# Patient Record
Sex: MALE | Race: WHITE | NOT HISPANIC OR LATINO | Employment: OTHER | URBAN - METROPOLITAN AREA
[De-identification: names, ages, dates, MRNs, and addresses within clinical notes are randomized per-mention and may not be internally consistent; named-entity substitution may affect disease eponyms.]

---

## 2017-01-19 ENCOUNTER — TELEPHONE (OUTPATIENT)
Dept: FAMILY MEDICINE | Facility: CLINIC | Age: 57
End: 2017-01-19

## 2017-01-19 DIAGNOSIS — R05.9 COUGH: Primary | ICD-10-CM

## 2017-01-19 DIAGNOSIS — R53.83 FATIGUE: ICD-10-CM

## 2017-01-19 DIAGNOSIS — R09.81 NASAL CONGESTION: ICD-10-CM

## 2017-01-19 RX ORDER — AZITHROMYCIN 250 MG/1
TABLET, FILM COATED ORAL
Qty: 6 TABLET | Refills: 0 | Status: SHIPPED | OUTPATIENT
Start: 2017-01-19 | End: 2017-01-30

## 2017-01-19 NOTE — TELEPHONE ENCOUNTER
Acute Illness   Acute illness concerns: cold/congestion/cough/fatigue  Onset: pt on 3rd day home sick,  Has been lingering for about a week now    Fever: no    Chills/Sweats: YES    Headache (location?): no    Sinus Pressure:YES- above nose    Conjunctivitis:  no    Ear Pain: no    Rhinorrhea: YES    Congestion: YES    Sore Throat: YES     Cough: YES-non-productive right now    Wheeze: no    Decreased Appetite: YES    Nausea: no    Vomiting: no    Diarrhea:  YES    Dysuria/Freq.: no    Fatigue/Achiness: YES    Sick/Strep Exposure: YES- wife and kids have had colds and pt works in retail as well     Therapies Tried and outcome: water, aleve for a couple days, zycam- minimally effective.    Pt uses [facility]. AE below had spoken to TS regarding the pt, advised pt can have a zpack if triaged and noted symptoms for antibiotic.  The patient indicates understanding of these issues and agrees with the plan.  Dorothy Lo RN

## 2017-01-19 NOTE — TELEPHONE ENCOUNTER
Attempted to call patient.  Received patients voicemail.  Left a non-detailed message to call back and speak with any triage nurse.      Please triage patient for symptoms    Dorothy Anne RN, BSN   [place], Triage

## 2017-01-30 ENCOUNTER — APPOINTMENT (OUTPATIENT)
Dept: CT IMAGING | Facility: CLINIC | Age: 57
End: 2017-01-30
Attending: EMERGENCY MEDICINE
Payer: COMMERCIAL

## 2017-01-30 ENCOUNTER — HOSPITAL ENCOUNTER (EMERGENCY)
Facility: CLINIC | Age: 57
Discharge: HOME | End: 2017-01-30
Attending: EMERGENCY MEDICINE | Admitting: EMERGENCY MEDICINE
Payer: COMMERCIAL

## 2017-01-30 VITALS
HEIGHT: 76 IN | DIASTOLIC BLOOD PRESSURE: 85 MMHG | OXYGEN SATURATION: 98 % | RESPIRATION RATE: 17 BRPM | TEMPERATURE: 98 F | BODY MASS INDEX: 28.01 KG/M2 | WEIGHT: 230 LBS | HEART RATE: 68 BPM | SYSTOLIC BLOOD PRESSURE: 142 MMHG

## 2017-01-30 DIAGNOSIS — K57.32 DIVERTICULITIS OF LARGE INTESTINE WITHOUT PERFORATION OR ABSCESS WITHOUT BLEEDING: ICD-10-CM

## 2017-01-30 LAB
ANION GAP SERPL CALCULATED.3IONS-SCNC: 8 MMOL/L (ref 3–14)
BASOPHILS # BLD AUTO: 0 10E9/L (ref 0–0.2)
BASOPHILS NFR BLD AUTO: 0.4 %
BUN SERPL-MCNC: 14 MG/DL (ref 7–30)
CALCIUM SERPL-MCNC: 8.9 MG/DL (ref 8.5–10.1)
CHLORIDE SERPL-SCNC: 105 MMOL/L (ref 94–109)
CO2 SERPL-SCNC: 25 MMOL/L (ref 20–32)
CREAT BLD-MCNC: 1 MG/DL (ref 0.66–1.25)
CREAT SERPL-MCNC: 0.88 MG/DL (ref 0.66–1.25)
DIFFERENTIAL METHOD BLD: NORMAL
EOSINOPHIL # BLD AUTO: 0.3 10E9/L (ref 0–0.7)
EOSINOPHIL NFR BLD AUTO: 3.7 %
ERYTHROCYTE [DISTWIDTH] IN BLOOD BY AUTOMATED COUNT: 12.1 % (ref 10–15)
GFR SERPL CREATININE-BSD FRML MDRD: 78 ML/MIN/1.7M2
GFR SERPL CREATININE-BSD FRML MDRD: 90 ML/MIN/1.7M2
GLUCOSE SERPL-MCNC: 102 MG/DL (ref 70–99)
HCT VFR BLD AUTO: 41.1 % (ref 40–53)
HGB BLD-MCNC: 14.6 G/DL (ref 13.3–17.7)
IMM GRANULOCYTES # BLD: 0 10E9/L (ref 0–0.4)
IMM GRANULOCYTES NFR BLD: 0.4 %
LYMPHOCYTES # BLD AUTO: 0.8 10E9/L (ref 0.8–5.3)
LYMPHOCYTES NFR BLD AUTO: 9.3 %
MCH RBC QN AUTO: 29.6 PG (ref 26.5–33)
MCHC RBC AUTO-ENTMCNC: 35.5 G/DL (ref 31.5–36.5)
MCV RBC AUTO: 83 FL (ref 78–100)
MONOCYTES # BLD AUTO: 1 10E9/L (ref 0–1.3)
MONOCYTES NFR BLD AUTO: 11.5 %
NEUTROPHILS # BLD AUTO: 6.2 10E9/L (ref 1.6–8.3)
NEUTROPHILS NFR BLD AUTO: 74.7 %
NRBC # BLD AUTO: 0 10*3/UL
NRBC BLD AUTO-RTO: 0 /100
PLATELET # BLD AUTO: 262 10E9/L (ref 150–450)
POTASSIUM SERPL-SCNC: 4 MMOL/L (ref 3.4–5.3)
RBC # BLD AUTO: 4.94 10E12/L (ref 4.4–5.9)
SODIUM SERPL-SCNC: 138 MMOL/L (ref 133–144)
WBC # BLD AUTO: 8.3 10E9/L (ref 4–11)

## 2017-01-30 PROCEDURE — 25000128 H RX IP 250 OP 636: Performed by: EMERGENCY MEDICINE

## 2017-01-30 PROCEDURE — 25000125 ZZHC RX 250: Performed by: EMERGENCY MEDICINE

## 2017-01-30 PROCEDURE — 25500064 ZZH RX 255 OP 636: Performed by: EMERGENCY MEDICINE

## 2017-01-30 PROCEDURE — 99285 EMERGENCY DEPT VISIT HI MDM: CPT | Mod: 25

## 2017-01-30 PROCEDURE — 96361 HYDRATE IV INFUSION ADD-ON: CPT

## 2017-01-30 PROCEDURE — 74177 CT ABD & PELVIS W/CONTRAST: CPT

## 2017-01-30 PROCEDURE — 82565 ASSAY OF CREATININE: CPT

## 2017-01-30 PROCEDURE — 96374 THER/PROPH/DIAG INJ IV PUSH: CPT | Mod: 59

## 2017-01-30 PROCEDURE — 80048 BASIC METABOLIC PNL TOTAL CA: CPT | Performed by: EMERGENCY MEDICINE

## 2017-01-30 PROCEDURE — 96376 TX/PRO/DX INJ SAME DRUG ADON: CPT

## 2017-01-30 PROCEDURE — 85025 COMPLETE CBC W/AUTO DIFF WBC: CPT | Performed by: EMERGENCY MEDICINE

## 2017-01-30 PROCEDURE — 25000132 ZZH RX MED GY IP 250 OP 250 PS 637: Performed by: EMERGENCY MEDICINE

## 2017-01-30 RX ORDER — OXYCODONE AND ACETAMINOPHEN 5; 325 MG/1; MG/1
1-2 TABLET ORAL EVERY 6 HOURS PRN
Qty: 20 TABLET | Refills: 0 | Status: SHIPPED | OUTPATIENT
Start: 2017-01-30 | End: 2017-02-01

## 2017-01-30 RX ORDER — CIPROFLOXACIN 500 MG/1
500 TABLET, FILM COATED ORAL ONCE
Status: DISCONTINUED | OUTPATIENT
Start: 2017-01-30 | End: 2017-01-30

## 2017-01-30 RX ORDER — IOPAMIDOL 755 MG/ML
500 INJECTION, SOLUTION INTRAVASCULAR ONCE
Status: COMPLETED | OUTPATIENT
Start: 2017-01-30 | End: 2017-01-30

## 2017-01-30 RX ORDER — ONDANSETRON 4 MG/1
4 TABLET, ORALLY DISINTEGRATING ORAL EVERY 6 HOURS PRN
Qty: 10 TABLET | Refills: 0 | Status: SHIPPED | OUTPATIENT
Start: 2017-01-30 | End: 2017-02-02

## 2017-01-30 RX ORDER — OXYCODONE HYDROCHLORIDE 5 MG/1
10 TABLET ORAL ONCE
Status: COMPLETED | OUTPATIENT
Start: 2017-01-30 | End: 2017-01-30

## 2017-01-30 RX ORDER — METRONIDAZOLE 500 MG/1
500 TABLET ORAL ONCE
Status: DISCONTINUED | OUTPATIENT
Start: 2017-01-30 | End: 2017-01-30

## 2017-01-30 RX ORDER — HYDROMORPHONE HYDROCHLORIDE 1 MG/ML
0.5 INJECTION, SOLUTION INTRAMUSCULAR; INTRAVENOUS; SUBCUTANEOUS
Status: DISCONTINUED | OUTPATIENT
Start: 2017-01-30 | End: 2017-01-30 | Stop reason: HOSPADM

## 2017-01-30 RX ADMIN — HYDROMORPHONE HYDROCHLORIDE 1 MG: 1 INJECTION, SOLUTION INTRAMUSCULAR; INTRAVENOUS; SUBCUTANEOUS at 07:20

## 2017-01-30 RX ADMIN — OXYCODONE HYDROCHLORIDE 10 MG: 5 TABLET ORAL at 09:11

## 2017-01-30 RX ADMIN — SODIUM CHLORIDE 1000 ML: 9 INJECTION, SOLUTION INTRAVENOUS at 06:16

## 2017-01-30 RX ADMIN — HYDROMORPHONE HYDROCHLORIDE 0.5 MG: 1 INJECTION, SOLUTION INTRAMUSCULAR; INTRAVENOUS; SUBCUTANEOUS at 06:16

## 2017-01-30 RX ADMIN — SODIUM CHLORIDE 65 ML: 9 INJECTION, SOLUTION INTRAVENOUS at 06:29

## 2017-01-30 RX ADMIN — AMOXICILLIN AND CLAVULANATE POTASSIUM 1 TABLET: 875; 125 TABLET, FILM COATED ORAL at 07:20

## 2017-01-30 RX ADMIN — IOPAMIDOL 100 ML: 755 INJECTION, SOLUTION INTRAVENOUS at 06:28

## 2017-01-30 ASSESSMENT — ENCOUNTER SYMPTOMS
FEVER: 0
CONSTIPATION: 0
ABDOMINAL PAIN: 1
VOMITING: 0
DIARRHEA: 0
NAUSEA: 0

## 2017-01-30 NOTE — ED NOTES
Comes in with lower abd pain  For past week has been getting worse  Hx of diverticulitis  Denies n/v/d  No fever or chills   Last time he had this type pain was in hospital for week

## 2017-01-30 NOTE — DISCHARGE INSTRUCTIONS
Discharge Instructions for Diverticulitis  You have been diagnosed with diverticulitis. This is a condition in which small pouches form in your colon (large intestine) and become inflamed or infected. Follow the guidelines below for home care.  As you recover    Eat a low-fiber diet. Your health care provider may advise a liquid diet. This gives your bowel a chance to rest so that it can recover.    Foods to include: flake cereal, mashed potatoes, pancakes, waffles, pasta, white bread, rice, applesauce, bananas, eggs, meat, fish, poultry, tofu, cooked vegetables    Take your medicines as directed. Do not stop taking the medicines, even if you feel better.    Monitor your temperature and report any rising temperature to your health care provider.    Take antibiotics exactly as directed. Do not miss any.    Drink 6 to 8 glasses of water every day, unless directed otherwise.    Use a heating pad or hot water bottle to reduce abdominal cramping or pain.  Preventing diverticulitis in the future    Eat a high-fiber diet. Fiber adds bulk to the stool so that it passes through the large intestine more easily.    Keep drinking 6 to 8 glasses of water every day, unless directed otherwise.    Begin an exercise program. Ask your health care provider how to get started. You can benefit from simple activities such as walking or gardening.    Treat diarrhea with a bland diet. Start with liquids only, then slowly add fiber over time.    Watch for changes in your bowel movements (constipation to diarrhea).    Get plenty of rest and sleep.  Follow-up care  Make a follow-up appointment as directed by our staff.  When to call your health care provider  Call your health care provider immediately if you have any of the following:    Fever above 100 F (37.7 C)    Chills    Severe cramps in the abdomen, most commonly the lower left side    Tenderness in the abdomen, most commonly the lower left side    Nausea and vomiting    Bleeding  from your rectum     [id number] [employer]. [address] [place] [address], [place] [address]. All rights reserved. This information is not intended as a substitute for professional medical care. Always follow your healthcare professional's instructions.      Opioid Medication Information    You have been given a prescription for an opioid (narcotic) pain medicine and/or have received a pain medicine while here in the Emergency Department. These medicines can make you drowsy or impaired. You must not drive, operate dangerous equipment, or engage in any other dangerous activities while taking these medications. If you drive while taking these medications, you could be arrested for DUI, or driving under the influence. Do not drink any alcohol while you are taking these medications.   Opioid pain medications can cause addiction. If you have a history of chemical dependency of any type, you are at a higher risk of becoming addicted to pain medications.  Only take these prescribed medications to treat your pain when all other options have been tried. Take it for as short a time and as few doses as possible. Store your pain pills in a secure place, as they are frequently stolen and provide a dangerous opportunity for children or visitors in your house to start abusing these powerful medications. We will not replace any lost or stolen medicine.  As soon as your pain is better, you should flush all your remaining medication.   Many prescription pain medications contain Tylenol  (acetaminophen), including Vicodin , Tylenol #3 , Norco , Lortab , and Percocet .  You should not take any extra pills of Tylenol  if you are using these prescription medications or you can get very sick.  Do not ever take more than 4000 mg of acetaminophen in any 24 hour period.  All opioids tend to cause constipation. Drink plenty of water and eat foods that have a lot of fiber, such as fruits, vegetables, prune juice, apple juice and high  fiber cereal.  Take a laxative if you don t move your bowels at least every other day. Miralax , Milk of Magnesia, Colace , or Senna  can be used to keep you regular.

## 2017-01-30 NOTE — ED PROVIDER NOTES
History     Chief Complaint:  Abdominal Pain      HPI   Gonzalez Beard is a 56 year old male with a history of diverticulitis who presents for evaluation of abdominal pain. About a week ago, the patient started to develop intermittent abdominal pain across his lower quadrants. On the night of 1/29/2017, his pain worsened, and this morning at 0400 it worsened further, prompting him to seek evaluation in the ED. He characterizes this pain as a cramping sensation that he currently rates at a severity of 8/10. He has a history of diverticulitis and states that his current pain feels similar to this. He has not had any fever, nausea, vomiting, diarrhea, or constipation in association with his current pain. He has no history of previous major intraabdominal surgeries.     Allergies:  NKDA     Medications:    pantoprazole (PROTONIX) 40 MG EC tablet  Probiotic Product (PROBIOTIC DAILY PO)  fish oil-omega-3 fatty acids (OMEGA 3) 1000 MG capsule  aspirin 81 MG tablet  Multiple Vitamin (MULTIVITAMINS PO)    Past Medical History:    Esophageal reflux    Diverticulitis  Hyperlipidemia   Insomnia  Inguinal hernia   Benign neoplasm of colon     Past Surgical History:    Esophagoscopy, diagnostic  Colonoscopy thru stoma w biopsy/cautery tumor/polyp/lesion  Stress echo  Upper GI endoscopy   EGD, combined     Family History:    CAD - Father and brother  Diabetes - Brother  Hypertension - Father  Cerebrovascular disease - Father  Cancer, colon - Brother  Cancer, prostate - Father     Social History:  Tobacco use:    Former smoker, 0.75 packs / day for 25 years   Alcohol use:    Positive - 10 drinks / week  Marital status:       Accompanied to ED by:  Alone      Review of Systems   Constitutional: Negative for fever.   Gastrointestinal: Positive for abdominal pain. Negative for nausea, vomiting, diarrhea and constipation.   All other systems reviewed and are negative.    Physical Exam   First Vitals:  BP: (!) 191/110  "mmHg  Pulse: 88  Temp: 98  F (36.7  C)  Resp: 17  Height: 193 cm (6' 4\")  Weight: 104.327 kg (230 lb)  SpO2: 96 %      Physical Exam  General/Appearance: appears stated age, well-groomed, appears moderately uncomfortable  Eyes: EOMI, no scleral injection, no icterus  ENT: MMM  Neck: supple, nl ROM, no stiffness  Cardiovascular: RRR, nl S1S2, no m/r/g, 2+ pulses in all 4 extremities, cap refill <2sec  Respiratory: CTAB, good air movement throughout, no wheezes/rhonchi/rales, no increased WOB, no retractions  Back: no lesions  GI: abd soft, non-distended, mild to moderate tenderness to palpation across the lower quadrants,  no HSM, no rebound, no guarding, nl BS  MSK: LOO, good tone, no bony abnormality  Skin: warm and well-perfused, no rash, no edema, no ecchymosis, nl turgor  Neuro: GCS 15, alert and oriented, no gross focal neuro deficits  Psych: interacts appropriately  Heme: no petechia, no purpura, no active bleeding    Emergency Department Course     Imaging:  Radiographic findings were communicated with the patient who voiced understanding of the findings.    CT Abdomen Pelvis w Contrast:  IMPRESSION:   1. Diverticulitis of the mid sigmoid colon.  2. No abscess.  Per radiology.     Laboratory:  CBC: WNL (WBC 8.3, HGB 14.6, )    BMP: Glucose 102 high, o/w WNL (Creatinine 0.88)  Creatinine POCT 0612: Creatinine 1.0, GFR Estimate 78    Interventions:  0616 Dilaudid 0.5 mg IV  0616 NS 1,000 mL IV  0720 Augmentin 1 tablet PO  0720 Dilaudid 1 mg IV   The patient's symptoms were improved with parenteral narcotics.    Emergency Department Course:  Nursing notes and vitals reviewed.  0600: I performed an exam of the patient as documented above.     0705: I updated and reassessed the patient.     0822: I updated and reassessed the patient. His pain is currently gone, and he feels comfortable with discharge with pain medications.     I personally reviewed the laboratory results with the Patient and answered all " "related questions prior to discharge.      Findings and plan explained to the Patient. Patient discharged home with instructions regarding supportive care, medications, and reasons to return. The importance of close follow-up was reviewed. The patient was prescribed Augmentin, Percocet, and Zofran ODT.      Impression & Plan      Medical Decision Making:  Gonzalez Beard is a 56 year old male with a history of diverticulitis who presents with similar pain. CT confirms sigmoid diverticulitis without any complications such as abscess or perforation. He states last time he was started on \"two antibiotics,\" however one of them ended up not working and therefore he requested to be given the second one. On my chart review, it appears that he was initially started on Flagyl and Cipro and then sent home on Augmentin, therefore we started Augmentin here per hi request. He is clinically feeling markedly better, has no signs of systemic illness, and I feel is stable for outpatient management.     Diagnosis:    ICD-10-CM   1. Diverticulitis of large intestine without perforation or abscess without bleeding K57.32     Disposition:  Discharged to home with Augmentin, Zofran ODT, and Percocet.     Discharge Medications:  New Prescriptions    AMOXICILLIN-CLAVULANATE (AUGMENTIN) 875-125 MG PER TABLET    Take 1 tablet by mouth 2 times daily for 10 days    ONDANSETRON (ZOFRAN ODT) 4 MG ODT TAB    Take 1 tablet (4 mg) by mouth every 6 hours as needed for nausea    OXYCODONE-ACETAMINOPHEN (PERCOCET) 5-325 MG PER TABLET    Take 1-2 tablets by mouth every 6 hours as needed for pain       IZak, am serving as a scribe at 6:00 AM on 1/30/2017 to document services personally performed by Dr. Brewer, based on my observations and the provider's statements to me.    [facility] EMERGENCY DEPARTMENT        Eileen Brewer MD  01/30/17 1146  "

## 2017-01-30 NOTE — ED AVS SNAPSHOT
[facility] Emergency Department    [address]    [facility] [address]    Phone:  [phone]    Fax:  [phone]                                       Gonzalez Beard   MRN: [id number]    Department:  [facility] Emergency Department   Date of Visit:  1/30/2017           Patient Information     Date Of Birth          1960        Your diagnoses for this visit were:     Diverticulitis of large intestine without perforation or abscess without bleeding        You were seen by Eileen Brewer MD.      Follow-up Information     Follow up with Bonilla Guzmán MD.    Specialty:  Family Practice    Why:  As needed, If symptoms worsen    Contact information:    [facility]  [address] [facility] [address]  [phone]          Follow up with [facility] Emergency Department.    Specialty:  EMERGENCY MEDICINE    Why:  As needed, If symptoms worsen    Contact information:    [address]  [facility] [address] [phone]        Discharge Instructions         Discharge Instructions for Diverticulitis  You have been diagnosed with diverticulitis. This is a condition in which small pouches form in your colon (large intestine) and become inflamed or infected. Follow the guidelines below for home care.  As you recover    Eat a low-fiber diet. Your health care provider may advise a liquid diet. This gives your bowel a chance to rest so that it can recover.    Foods to include: flake cereal, mashed potatoes, pancakes, waffles, pasta, white bread, rice, applesauce, bananas, eggs, meat, fish, poultry, tofu, cooked vegetables    Take your medicines as directed. Do not stop taking the medicines, even if you feel better.    Monitor your temperature and report any rising temperature to your health care provider.    Take antibiotics exactly as directed. Do not miss any.    Drink 6 to 8 glasses of water every day, unless directed  otherwise.    Use a heating pad or hot water bottle to reduce abdominal cramping or pain.  Preventing diverticulitis in the future    Eat a high-fiber diet. Fiber adds bulk to the stool so that it passes through the large intestine more easily.    Keep drinking 6 to 8 glasses of water every day, unless directed otherwise.    Begin an exercise program. Ask your health care provider how to get started. You can benefit from simple activities such as walking or gardening.    Treat diarrhea with a bland diet. Start with liquids only, then slowly add fiber over time.    Watch for changes in your bowel movements (constipation to diarrhea).    Get plenty of rest and sleep.  Follow-up care  Make a follow-up appointment as directed by our staff.  When to call your health care provider  Call your health care provider immediately if you have any of the following:    Fever above 100 F (37.7 C)    Chills    Severe cramps in the abdomen, most commonly the lower left side    Tenderness in the abdomen, most commonly the lower left side    Nausea and vomiting    Bleeding from your rectum     8704-0609 [employer]. [address] [place] [address]. All rights reserved. This information is not intended as a substitute for professional medical care. Always follow your healthcare professional's instructions.      Opioid Medication Information    You have been given a prescription for an opioid (narcotic) pain medicine and/or have received a pain medicine while here in the Emergency Department. These medicines can make you drowsy or impaired. You must not drive, operate dangerous equipment, or engage in any other dangerous activities while taking these medications. If you drive while taking these medications, you could be arrested for DUI, or driving under the influence. Do not drink any alcohol while you are taking these medications.   Opioid pain medications can cause addiction. If you have a history of chemical  dependency of any type, you are at a higher risk of becoming addicted to pain medications.  Only take these prescribed medications to treat your pain when all other options have been tried. Take it for as short a time and as few doses as possible. Store your pain pills in a secure place, as they are frequently stolen and provide a dangerous opportunity for children or visitors in your house to start abusing these powerful medications. We will not replace any lost or stolen medicine.  As soon as your pain is better, you should flush all your remaining medication.   Many prescription pain medications contain Tylenol  (acetaminophen), including Vicodin , Tylenol #3 , Norco , Lortab , and Percocet .  You should not take any extra pills of Tylenol  if you are using these prescription medications or you can get very sick.  Do not ever take more than 4000 mg of acetaminophen in any 24 hour period.  All opioids tend to cause constipation. Drink plenty of water and eat foods that have a lot of fiber, such as fruits, vegetables, prune juice, apple juice and high fiber cereal.  Take a laxative if you don t move your bowels at least every other day. Miralax , Milk of Magnesia, Colace , or Senna  can be used to keep you regular.            24 Hour Appointment Hotline       To make an appointment at any [facility], call [phone] ([phone]). If you don't have a family doctor or clinic, we will help you find one. [facility] are conveniently located to serve the needs of you and your family.             Review of your medicines      START taking        Dose / Directions Last dose taken    amoxicillin-clavulanate 875-125 MG per tablet   Commonly known as:  AUGMENTIN   Dose:  1 tablet   Quantity:  20 tablet        Take 1 tablet by mouth 2 times daily for 10 days   Refills:  0        ondansetron 4 MG ODT tab   Commonly known as:  ZOFRAN ODT   Dose:  4 mg   Quantity:  10 tablet        Take 1 tablet (4 mg) by mouth  every 6 hours as needed for nausea   Refills:  0        oxyCODONE-acetaminophen 5-325 MG per tablet   Commonly known as:  PERCOCET   Dose:  1-2 tablet   Quantity:  20 tablet        Take 1-2 tablets by mouth every 6 hours as needed for pain   Refills:  0          Our records show that you are taking the medicines listed below. If these are incorrect, please call your family doctor or clinic.        Dose / Directions Last dose taken    aspirin 81 MG tablet        Take  by mouth daily.   Refills:  3        fish oil-omega-3 fatty acids 1000 MG capsule   Dose:  1 g   Quantity:  90 capsule        Take 1 capsule by mouth daily.   Refills:  3        MULTIVITAMINS PO   Dose:  1 tablet        Take 1 tablet by mouth daily   Refills:  0        pantoprazole 40 MG EC tablet   Commonly known as:  PROTONIX   Quantity:  90 tablet        TAKE ONE TABLET BY MOUTH DAILY   Refills:  0        PROBIOTIC DAILY PO   Dose:  1 capsule        Take 1 capsule by mouth daily   Refills:  0                Prescriptions were sent or printed at these locations (3 Prescriptions)                   Other Prescriptions                Printed at Department/Unit printer (3 of 3)         amoxicillin-clavulanate (AUGMENTIN) 875-125 MG per tablet               oxyCODONE-acetaminophen (PERCOCET) 5-325 MG per tablet               ondansetron (ZOFRAN ODT) 4 MG ODT tab                Procedures and tests performed during your visit     Basic metabolic panel    CBC with platelets differential    CT Abdomen Pelvis w Contrast    Creatinine POCT      Orders Needing Specimen Collection     Ordered          01/30/17 0602  Creatinine POCT - STAT, Prio: STAT, Needs to be Collected     Scheduled Task Status   01/30/17 0602 Collect Creatinine POCT Open   Order Class:  PCU Collect                01/30/17 0602  UA with Microscopic - STAT, Prio: STAT, Needs to be Collected     Scheduled Task Status   01/30/17 0602 Collect UA with Microscopic Open   Order Class:  PCU Collect                   Pending Results     No orders found from 1/29/2017 to 1/31/2017.            Pending Culture Results     No orders found from 1/29/2017 to 1/31/2017.       Test Results from your hospital stay           1/30/2017  6:18 AM - Interface, [employer] Results      Component Results     Component Value Ref Range & Units Status    WBC 8.3 4.0 - 11.0 10e9/L Final    RBC Count 4.94 4.4 - 5.9 10e12/L Final    Hemoglobin 14.6 13.3 - 17.7 g/dL Final    Hematocrit 41.1 40.0 - 53.0 % Final    MCV 83 78 - 100 fl Final    MCH 29.6 26.5 - 33.0 pg Final    MCHC 35.5 31.5 - 36.5 g/dL Final    RDW 12.1 10.0 - 15.0 % Final    Platelet Count 262 150 - 450 10e9/L Final    Diff Method Automated Method  Final    % Neutrophils 74.7 % Final    % Lymphocytes 9.3 % Final    % Monocytes 11.5 % Final    % Eosinophils 3.7 % Final    % Basophils 0.4 % Final    % Immature Granulocytes 0.4 % Final    Nucleated RBCs 0 0 /100 Final    Absolute Neutrophil 6.2 1.6 - 8.3 10e9/L Final    Absolute Lymphocytes 0.8 0.8 - 5.3 10e9/L Final    Absolute Monocytes 1.0 0.0 - 1.3 10e9/L Final    Absolute Eosinophils 0.3 0.0 - 0.7 10e9/L Final    Absolute Basophils 0.0 0.0 - 0.2 10e9/L Final    Abs Immature Granulocytes 0.0 0 - 0.4 10e9/L Final    Absolute Nucleated RBC 0.0  Final         1/30/2017  6:37 AM - Interface, Flexilab Results      Component Results     Component Value Ref Range & Units Status    Sodium 138 133 - 144 mmol/L Final    Potassium 4.0 3.4 - 5.3 mmol/L Final    Chloride 105 94 - 109 mmol/L Final    Carbon Dioxide 25 20 - 32 mmol/L Final    Anion Gap 8 3 - 14 mmol/L Final    Glucose 102 (H) 70 - 99 mg/dL Final    Urea Nitrogen 14 7 - 30 mg/dL Final    Creatinine 0.88 0.66 - 1.25 mg/dL Final    GFR Estimate 90 >60 mL/min/1.7m2 Final    Non  GFR Calc    GFR Estimate If Black >90   GFR Calc   >60 mL/min/1.7m2 Final    Calcium 8.9 8.5 - 10.1 mg/dL Final         1/30/2017  7:04 AM - Interface, Radiant Ib       Narrative     CT ABDOMEN AND PELVIS WITH CONTRAST   1/30/2017 6:37 AM     HISTORY: Lower abdominal pain.    COMPARISON: 4/23/2014.    TECHNIQUE: Following the uneventful administration of 100mL Isovue-370  intravenous contrast, helical sections were acquired from the top of  the diaphragm through the pubic symphysis. Coronal reconstructions  were generated. Radiation dose for this scan was reduced using  automated exposure control, adjustment of the mA and/or kV according  to the patient's size, or iterative reconstruction technique.    FINDINGS:     Abdomen: A 0.4 cm low-attenuation lesion in the posterior segment of  the right lobe of the liver is too small to characterize. The spleen,  pancreas, adrenal glands and kidneys are unremarkable. The gallbladder  is present. No enlarged lymph nodes or free fluid in the upper  abdomen. Atherosclerotic calcification in the abdominal aorta.    Scan through the lower chest is unremarkable.    Pelvis: The small and large bowel are normal in caliber. Several  diverticula are present in the colon. Mild wall thickening of a few  centimeter long segment of the mid sigmoid colon, in the region of  diverticula. Mild haziness within the fat about the thick-walled  bowel. These findings likely relate to diverticulitis. No extraluminal  gas or loculated fluid collection in the pelvis. The appendix is  unremarkable. Mild enlargement of the prostate gland. No enlarged  lymph nodes or free fluid in the pelvis.        Impression     IMPRESSION:   1. Diverticulitis of the mid sigmoid colon.  2. No abscess.    KAYLA ALONSO MD               1/30/2017  6:20 AM - Interface, [employer] Results      Component Results     Component Value Ref Range & Units Status    Creatinine 1.0 0.66 - 1.25 mg/dL Final    GFR Estimate 78 >60 mL/min/1.7m2 Final    GFR Estimate If Black >90 >60 mL/min/1.7m2 Final                Clinical Quality Measure: Blood Pressure Screening     Your blood pressure was  checked while you were in the emergency department today. The last reading we obtained was  BP: 142/85 mmHg . Please read the guidelines below about what these numbers mean and what you should do about them.  If your systolic blood pressure (the top number) is less than 120 and your diastolic blood pressure (the bottom number) is less than 80, then your blood pressure is normal. There is nothing more that you need to do about it.  If your systolic blood pressure (the top number) is 120-139 or your diastolic blood pressure (the bottom number) is 80-89, your blood pressure may be higher than it should be. You should have your blood pressure rechecked within a year by a primary care provider.  If your systolic blood pressure (the top number) is 140 or greater or your diastolic blood pressure (the bottom number) is 90 or greater, you may have high blood pressure. High blood pressure is treatable, but if left untreated over time it can put you at risk for heart attack, stroke, or kidney failure. You should have your blood pressure rechecked by a primary care provider within the next 4 weeks.  If your provider in the emergency department today gave you specific instructions to follow-up with your doctor or provider even sooner than that, you should follow that instruction and not wait for up to 4 weeks for your follow-up visit.        Thank you for choosing [facility]       Thank you for choosing [facility] for your care. Our goal is always to provide you with excellent care. Hearing back from our patients is one way we can continue to improve our services. Please take a few minutes to complete the written survey that you may receive in the mail after you visit with us. Thank you!        [employer] Information     [employer] gives you secure access to your electronic health record. If you see a primary care provider, you can also send messages to your care team and make appointments. If you have questions, please call your primary  care clinic.  If you do not have a primary care provider, please call [phone] and they will assist you.        Care EveryWhere ID     This is your Care EveryWhere ID. This could be used by other organizations to access your [place] [place] medical records  [id number]        After Visit Summary       This is your record. Keep this with you and show to your community pharmacist(s) and doctor(s) at your next visit.

## 2017-01-30 NOTE — ED AVS SNAPSHOT
[facility] Emergency Department    [address]    [facility] [address]    Phone:  [phone]    Fax:  [phone]                                       Gonzalez Beard   MRN: [id number]    Department:  [facility] Emergency Department   Date of Visit:  1/30/2017           After Visit Summary Signature Page     I have received my discharge instructions, and my questions have been answered. I have discussed any challenges I see with this plan with the nurse or doctor.    ..........................................................................................................................................  Patient/Patient Representative Signature      ..........................................................................................................................................  Patient Representative Print Name and Relationship to Patient    ..................................................               ................................................  Date                                            Time    ..........................................................................................................................................  Reviewed by Signature/Title    ...................................................              ..............................................  Date                                                            Time

## 2017-02-01 ENCOUNTER — TELEPHONE (OUTPATIENT)
Dept: FAMILY MEDICINE | Facility: CLINIC | Age: 57
End: 2017-02-01

## 2017-02-01 DIAGNOSIS — K57.32 DIVERTICULITIS OF LARGE INTESTINE WITHOUT PERFORATION OR ABSCESS WITHOUT BLEEDING: Primary | ICD-10-CM

## 2017-02-01 RX ORDER — OXYCODONE AND ACETAMINOPHEN 5; 325 MG/1; MG/1
1-2 TABLET ORAL EVERY 6 HOURS PRN
Qty: 20 TABLET | Refills: 0 | Status: SHIPPED | OUTPATIENT
Start: 2017-02-01 | End: 2017-05-17

## 2017-02-01 NOTE — TELEPHONE ENCOUNTER
"CAlled pt    Medical Decision Making:  Gonzalez Beard is a 56 year old male with a history of diverticulitis who presents with similar pain. CT confirms sigmoid diverticulitis without any complications such as abscess or perforation. He states last time he was started on \"two antibiotics,\" however one of them ended up not working and therefore he requested to be given the second one. On my chart review, it appears that he was initially started on Flagyl and Cipro and then sent home on Augmentin, therefore we started Augmentin here per hi request. He is clinically feeling markedly better, has no signs of systemic illness, and I feel is stable for outpatient management.     Diagnosis:      ICD-10-CM    1.  Diverticulitis of large intestine without perforation or abscess without bleeding  K57.32      Disposition:  Discharged to home with Augmentin, Zofran ODT, and Percocet.      Discharge Medications:  New Prescriptions      AMOXICILLIN-CLAVULANATE (AUGMENTIN) 875-125 MG PER TABLET     Take 1 tablet by mouth 2 times daily for 10 days      ONDANSETRON (ZOFRAN ODT) 4 MG ODT TAB     Take 1 tablet (4 mg) by mouth every 6 hours as needed for nausea      OXYCODONE-ACETAMINOPHEN (PERCOCET) 5-325 MG PER TABLET     Take 1-2 tablets by mouth every 6 hours as needed for pain              Pt was requesting additional pain meds.    Advised per tS MD will refill medication then if not improved to follow up.    Iris Aponte RN    [facility]    "

## 2017-03-10 DIAGNOSIS — K21.9 GASTROESOPHAGEAL REFLUX DISEASE WITHOUT ESOPHAGITIS: ICD-10-CM

## 2017-03-10 RX ORDER — PANTOPRAZOLE SODIUM 40 MG/1
40 TABLET, DELAYED RELEASE ORAL DAILY
Qty: 90 TABLET | Refills: 0 | Status: SHIPPED | OUTPATIENT
Start: 2017-03-10 | End: 2017-06-27

## 2017-05-17 ENCOUNTER — TELEPHONE (OUTPATIENT)
Dept: FAMILY MEDICINE | Facility: CLINIC | Age: 57
End: 2017-05-17

## 2017-05-17 ENCOUNTER — OFFICE VISIT (OUTPATIENT)
Dept: FAMILY MEDICINE | Facility: CLINIC | Age: 57
End: 2017-05-17
Payer: COMMERCIAL

## 2017-05-17 VITALS
BODY MASS INDEX: 28.01 KG/M2 | TEMPERATURE: 97.5 F | OXYGEN SATURATION: 96 % | WEIGHT: 230 LBS | SYSTOLIC BLOOD PRESSURE: 122 MMHG | HEIGHT: 76 IN | DIASTOLIC BLOOD PRESSURE: 70 MMHG | HEART RATE: 87 BPM

## 2017-05-17 DIAGNOSIS — K57.32 DIVERTICULITIS OF LARGE INTESTINE WITHOUT PERFORATION OR ABSCESS WITHOUT BLEEDING: Primary | ICD-10-CM

## 2017-05-17 DIAGNOSIS — R06.83 SNORING: ICD-10-CM

## 2017-05-17 DIAGNOSIS — Z51.81 MEDICATION MONITORING ENCOUNTER: ICD-10-CM

## 2017-05-17 PROCEDURE — 99214 OFFICE O/P EST MOD 30 MIN: CPT | Performed by: FAMILY MEDICINE

## 2017-05-17 RX ORDER — METRONIDAZOLE 500 MG/1
500 TABLET ORAL 2 TIMES DAILY
Qty: 20 TABLET | Refills: 0 | Status: SHIPPED | OUTPATIENT
Start: 2017-05-17 | End: 2017-06-28

## 2017-05-17 RX ORDER — CIPROFLOXACIN 500 MG/1
500 TABLET, FILM COATED ORAL 2 TIMES DAILY
Qty: 20 TABLET | Refills: 0 | Status: SHIPPED | OUTPATIENT
Start: 2017-05-17 | End: 2017-06-28

## 2017-05-17 RX ORDER — OXYCODONE AND ACETAMINOPHEN 5; 325 MG/1; MG/1
1-2 TABLET ORAL EVERY 6 HOURS PRN
Qty: 20 TABLET | Refills: 0 | Status: SHIPPED | OUTPATIENT
Start: 2017-05-17 | End: 2017-06-28

## 2017-05-17 NOTE — PATIENT INSTRUCTIONS
[facility]                        To reach your care team during and after hours:   [phone]  To reach our pharmacy:        [phone]    Clinic Hours                        Our clinic hours are:    Monday   7:30 am to 7:00 pm                  Tuesday through Friday 7:30 am to 5:00 pm                             Saturday   8:00 am to 12:00 pm      Sunday   Closed      Pharmacy Hours                        Our pharmacy hours are:    Monday   8:30 am to 7:00 pm       Tuesday to Friday  8:30 am to 6:00 pm                       Saturday    9:00 am to 1:00 pm              Sunday    Closed              There is also information available at our web site:  [email or web address]    If your provider ordered any lab tests and you do not receive the results within 10 business days, please call the clinic.    If you need a medication refill please contact your pharmacy.  Please allow 2-3 business days for your refill to be completed.    Our clinic offers telephone visits and e visits.  Please ask one of your team members to explain more.      Use [employer] (secure email communication and access to your chart) to send your primary care provider a message or make an appointment. Ask someone on your Team how to sign up for [employer].  Immunizations                      Immunization History   Administered Date(s) Administered     Influenza Vaccine IM 3yrs+ 4 Valent IIV4 12/08/2015     TDAP Vaccine (Adacel) 05/01/2007        Health Maintenance                         Health Maintenance Due   Topic Date Due     Discuss Advance Directive Planning  07/14/1979     Hepatitis C Screening  07/14/1979     Cholesterol Lab - yearly  12/15/2016     Prostate Test (PSA) - yearly  12/15/2016     Colon Cancer Screening - FIT Test - yearly  12/22/2016     Wellness Visit with your Primary Provider - yearly  12/22/2016     Tetanus Vaccine - every 10 years  05/01/2017

## 2017-05-17 NOTE — NURSING NOTE
"Chief Complaint   Patient presents with     Abdominal Pain       Initial /70  Pulse 87  Temp 97.5  F (36.4  C) (Oral)  Ht 6' 4\" (1.93 m)  Wt 230 lb (104.3 kg)  SpO2 96%  BMI 28 kg/m2 Estimated body mass index is 28 kg/(m^2) as calculated from the following:    Height as of this encounter: 6' 4\" (1.93 m).    Weight as of this encounter: 230 lb (104.3 kg)..  BP completed using cuff size: chauncey Conway MA  "

## 2017-05-17 NOTE — PROGRESS NOTES
SUBJECTIVE:                                                    Gonzalez Beard is a 56 year old male who presents to clinic today for the following health issues:    ABDOMINAL PAIN     Onset: yesterday    Description:   Character: Sharp  Location: right lower quadrant  Radiation: None    Intensity: moderate    Progression of Symptoms: worsening    Accompanying Signs & Symptoms:  Fever/Chills?: YES  Gas/Bloating: YES  Nausea: no   Vomitting: no   Diarrhea?: YES  Constipation:no   Dysuria or Hematuria: no    History:   Trauma: no   Previous similar pain: YES   Previous tests done: CT and Colonoscopy    Precipitating factors:   Does the pain change with:   Food: yes   BM: YES  Urination: no     Alleviating factors:  nothing    Therapies Tried and outcome: no    LMP: not applicable        Abdominal Pain -- The patient has been having increased lower abdominal pain with a history of diverticulitis. He has also had a pain with movement mild diarrhea, fever and chills yesterday. The patient states that the pain is similar to previous diverticulitis pain. The patient was last seen for diverticulitis on 1/30/17 which was diagnosed with a CT scan. He was given Flagyl, Cipro, and Augmentin. He denies nausea, chest pain, vomiting, and hematochezia. Long standing hx of diverticulosis since 2007.    Sleep Problem -- The patient states that he has been having apneic episodes while sleeping. He has also been experiencing snoring and fatigue throughout the day.    Problem list and histories reviewed & adjusted, as indicated.  Additional history: as documented      Reviewed and updated as needed this visit by clinical staff  Tobacco  Allergies  Meds  Med Hx  Surg Hx  Fam Hx  Soc Hx      Reviewed and updated as needed this visit by Provider         BP Readings from Last 3 Encounters:   05/17/17 122/70   01/30/17 142/85   01/08/16 130/80       Wt Readings from Last 4 Encounters:   05/17/17 230 lb (104.3 kg)   01/30/17 230 lb  (104.3 kg)   01/08/16 230 lb (104.3 kg)   12/08/15 232 lb (105.2 kg)       Health Maintenance    Health Maintenance Due   Topic Date Due     ADVANCE DIRECTIVE PLANNING Q5 YRS (NO INBASKET)  07/14/1979     HEPATITIS C SCREENING  07/14/1979     LIPID MONITORING Q1 YEAR( NO INBASKET)  12/15/2016     PSA Q1 YR  12/15/2016     FIT Q1 YR (NO INBASKET)  12/22/2016     WELLNESS VISIT Q1 YR (NO INBASKET)  12/22/2016     TETANUS Q10 YR  05/01/2017       Current Problem List    Patient Active Problem List   Diagnosis     Esophageal reflux     Insomnia     HYPERLIPIDEMIA LDL GOAL <130     Diverticulitis     Shingles       Past Medical History    Past Medical History:   Diagnosis Date     Benign neoplasm of colon 5//08    multiple with diverticulosis - adenomatous, hyperplastic     Diverticulitis of colon 9/07, 1/17     Esophageal reflux 2005     Hyperlipidemia LDL goal <130 1997     Hyperplastic colon polyp 7/09    due 5 yrs     Inguinal hernia with obstruction, without mention of gangrene, unilateral or unspecified, (not specified as recurrent) 9/19/07    right detected on exam      Insomnia, unspecified      Shingles 4/14    rt face/ear     Stricture and stenosis of esophagus        Past Surgical History    Past Surgical History:   Procedure Laterality Date     ESOPHAGOSCOPY, GASTROSCOPY, DUODENOSCOPY (EGD), COMBINED N/A 1/8/2016    Procedure: COMBINED ESOPHAGOSCOPY, GASTROSCOPY, DUODENOSCOPY (EGD);  Surgeon: Reginald Julien MD;  Location: [facility]     HC COLONOSCOPY THRU STOMA W BIOPSY/CAUTERY TUMOR/POLYP/LESION  5/08, 7/09, 7/11    multiple polyps with diverticulosis - due 5 yr     HC ESOPHAGOSCOPY, DIAGNOSTIC  5/07, 2/09    Dr Elena - reactive gastropathy - with strictures dilated x 2  = 5/07, 6/15/07      stress echo  9/11    normal     UPPER GI ENDOSCOPY  6/15, 1/16    stricture, food caught, [place] - 1/16 normal [facility]       Current Medications    Current Outpatient Prescriptions   Medication Sig Dispense Refill      ciprofloxacin (CIPRO) 500 MG tablet Take 1 tablet (500 mg) by mouth 2 times daily 20 tablet 0     metroNIDAZOLE (FLAGYL) 500 MG tablet Take 1 tablet (500 mg) by mouth 2 times daily 20 tablet 0     oxyCODONE-acetaminophen (PERCOCET) 5-325 MG per tablet Take 1-2 tablets by mouth every 6 hours as needed for pain 20 tablet 0     pantoprazole (PROTONIX) 40 MG EC tablet Take 1 tablet (40 mg) by mouth daily 90 tablet 0     Probiotic Product (PROBIOTIC DAILY PO) Take 1 capsule by mouth daily       fish oil-omega-3 fatty acids (OMEGA 3) 1000 MG capsule Take 1 capsule by mouth daily. 90 capsule 3     aspirin 81 MG tablet Take  by mouth daily.  3     Multiple Vitamin (MULTIVITAMINS PO) Take 1 tablet by mouth daily          Allergies    No Known Allergies    Immunizations    Immunization History   Administered Date(s) Administered     Influenza Vaccine IM 3yrs+ 4 Valent IIV4 12/08/2015     TDAP Vaccine (Adacel) 05/01/2007       Family History    Family History   Problem Relation Age of Onset     C.A.D. Father 60     Hypertension Father      CEREBROVASCULAR DISEASE Father 65     Prostate Cancer Father 65     Colon Cancer Brother 63     Coronary Artery Disease Brother 60     bypass x 5 - CHF     DIABETES Brother 50     Breast Cancer No family hx of        Social History    Social History     Social History     Marital status:      Spouse name: Sabrina     Number of children: 2     Years of education: 16     Occupational History           [employer]-owns shop      [employer]     Social History Main Topics     Smoking status: Former Smoker     Packs/day: 0.75     Years: 25.00     Types: Cigarettes     Smokeless tobacco: Former User     Quit date: 4/30/2010     Alcohol use 6.0 oz/week     10 Standard drinks or equivalent per week      Comment: 3x week 3-4 drinks     Drug use: Yes      Comment: occ marijuana     Sexual activity: Yes     Partners: Female     Other Topics Concern     Caffeine Concern Yes     2 cups, <1 can qd      "Exercise Yes     regularly exercising     Seat Belt Yes     Parent/Sibling W/ Cabg, Mi Or Angioplasty Before 65f 55m? No     Social History Narrative       All above reviewed and updated, all stable unless otherwise noted    Recent labs reviewed    ROS:  C: NEGATIVE for fever, chills, change in weight  I: NEGATIVE for worrisome rashes, moles or lesions  E: NEGATIVE for vision changes or irritation  E/M: NEGATIVE for ear, mouth and throat problems  R: NEGATIVE for significant cough or SOB  B: NEGATIVE for masses, tenderness or discharge  CV: NEGATIVE for chest pain, palpitations or peripheral edema  GI: NEGATIVE for nausea, abdominal pain, heartburn, or change in bowel habits  : NEGATIVE for frequency, dysuria, or hematuria  M: NEGATIVE for significant arthralgias or myalgia  N: NEGATIVE for weakness, dizziness or paresthesias  E: NEGATIVE for temperature intolerance, skin/hair changes  H: NEGATIVE for bleeding problems  P: NEGATIVE for changes in mood or affect    OBJECTIVE:                                                    /70  Pulse 87  Temp 97.5  F (36.4  C) (Oral)  Ht 6' 4\" (1.93 m)  Wt 230 lb (104.3 kg)  SpO2 96%  BMI 28 kg/m2  Body mass index is 28 kg/(m^2).  GENERAL: healthy, alert and no distress  EYES: Eyes grossly normal to inspection, extraocular movements - intact, and PERRL  HENT: ear canals- normal; TMs- normal; Nose- normal; Mouth- no ulcers, no lesions  NECK: no tenderness, no adenopathy, no asymmetry, no masses, no stiffness; thyroid- normal to palpation  RESP: lungs clear to auscultation - no rales, no rhonchi, no wheezes  CV: regular rates and rhythm, normal S1 S2, no S3 or S4 and no murmur, no click or rub -  ABDOMEN: pain with palpation of the LLQ, otherwise soft, no tenderness, no  hepatosplenomegaly, no masses, normal bowel sounds  MS: extremities- no gross deformities noted, no edema  SKIN: no suspicious lesions, no rashes  NEURO: strength and tone- normal, sensory exam- " grossly normal, mentation- intact, speech- normal, reflexes- symmetric  BACK: no CVA tenderness, no paralumbar tenderness  - male: testicles- normal, no atrophy, no masses;  no inguinal hernias  PSYCH: Alert and oriented times 3; speech- coherent , normal rate and volume; able to articulate logical thoughts, able to abstract reason, no tangential thoughts, no hallucinations or delusions, affect- normal  LYMPHATICS: ant. cervical- normal, post. cervical- normal, axillary- normal, supraclavicular- normal, inguinal- normal    DIAGNOSTICS/PROCEDURES:                                                      none      ASSESSMENT/PLAN:                                                        ICD-10-CM    1. Diverticulitis of large intestine without perforation or abscess without bleeding K57.32 Comprehensive metabolic panel     CBC with platelets     Erythrocyte sedimentation rate auto     CRP inflammation     ciprofloxacin (CIPRO) 500 MG tablet     metroNIDAZOLE (FLAGYL) 500 MG tablet     oxyCODONE-acetaminophen (PERCOCET) 5-325 MG per tablet   2. Snoring R06.83 SLEEP EVALUATION & MANAGEMENT REFERRAL - ADULT   3. Medication monitoring encounter Z51.81      Diverticulitis - Start taking cipro, flagyl, and Percocet. Possible CT and surgical referral - Dr Hayward - for persistent pain.    Snoring - Followup with sleep study.      Discussed treatment/modality options, including risk and benefits, he desires new medication(s). All diagnosis above reviewed and noted above, otherwise stable.  See [facility] orders for further details.  Follow up as needed.    Health Maintenance Due   Topic Date Due     ADVANCE DIRECTIVE PLANNING Q5 YRS (NO INBASKET)  07/14/1979     HEPATITIS C SCREENING  07/14/1979     LIPID MONITORING Q1 YEAR( NO INBASKET)  12/15/2016     PSA Q1 YR  12/15/2016     FIT Q1 YR (NO INBASKET)  12/22/2016     WELLNESS VISIT Q1 YR (NO INBASKET)  12/22/2016     TETANUS Q10 YR  05/01/2017       See Patient  Instructions    This document serves as a record of the services and decisions personally performed and made by Bonilla Guzmán MD. It was created on his behalf by Eileen Richardson, a trained medical scribe. The creation of this document is based on the provider's statements to the medical scribe.  Eileen Richardson 2:34 PM 5/17/2017           Bonilla Guzmán MD [address]  [address] [phone] Fax

## 2017-05-17 NOTE — MR AVS SNAPSHOT
After Visit Summary   5/17/2017    Gonzalez Beard    MRN: [id number]           Patient Information     Date Of Birth          1960        Visit Information        Provider Department      5/17/2017 2:00 PM Bonilla Guzmán MD [facility]        Today's Diagnoses     Diverticulitis of large intestine without perforation or abscess without bleeding    -  1    Snoring          Care Instructions      [facility] [place]                        To reach your care team during and after hours:   [phone]  To reach our pharmacy:        [phone]    Clinic Hours                        Our clinic hours are:    Monday   7:30 am to 7:00 pm                  Tuesday through Friday 7:30 am to 5:00 pm                             Saturday   8:00 am to 12:00 pm      Sunday   Closed      Pharmacy Hours                        Our pharmacy hours are:    Monday   8:30 am to 7:00 pm       Tuesday to Friday  8:30 am to 6:00 pm                       Saturday    9:00 am to 1:00 pm              Sunday    Closed              There is also information available at our web site:  [email or web address]    If your provider ordered any lab tests and you do not receive the results within 10 business days, please call the clinic.    If you need a medication refill please contact your pharmacy.  Please allow 2-3 business days for your refill to be completed.    Our clinic offers telephone visits and e visits.  Please ask one of your team members to explain more.      Use [employer] (secure email communication and access to your chart) to send your primary care provider a message or make an appointment. Ask someone on your Team how to sign up for [facility].  Immunizations                      Immunization History   Administered Date(s) Administered     Influenza Vaccine IM 3yrs+ 4 Valent IIV4 12/08/2015     TDAP Vaccine (Adacel) 05/01/2007        Health Maintenance                         Health Maintenance Due    Topic Date Due     Discuss Advance Directive Planning  07/14/1979     Hepatitis C Screening  07/14/1979     Cholesterol Lab - yearly  12/15/2016     Prostate Test (PSA) - yearly  12/15/2016     Colon Cancer Screening - FIT Test - yearly  12/22/2016     Wellness Visit with your Primary Provider - yearly  12/22/2016     Tetanus Vaccine - every 10 years  05/01/2017             Follow-ups after your visit        Additional Services     SLEEP EVALUATION & MANAGEMENT REFERRAL - ADULT       Please be aware that coverage of these services is subject to the terms and limitations of your health insurance plan.  Call member services at your health plan with any benefit or coverage questions.      Please bring the following to your appointment:    >>   List of current medications   >>   This referral request   >>   Any documents/labs given to you for this referral    [facility] [place], [place] [phone] (Age 18 and up)                  Future tests that were ordered for you today     Open Future Orders        Priority Expected Expires Ordered    SLEEP EVALUATION & MANAGEMENT REFERRAL - ADULT Routine  5/17/2018 5/17/2017            Who to contact     If you have questions or need follow up information about today's clinic visit or your schedule please contact [facility] directly at [phone].  Normal or non-critical lab and imaging results will be communicated to you by MyChart, letter or phone within 4 business days after the clinic has received the results. If you do not hear from us within 7 days, please contact the clinic through MyChart or phone. If you have a critical or abnormal lab result, we will notify you by phone as soon as possible.  Submit refill requests through [employer] or call your pharmacy and they will forward the refill request to us. Please allow 3 business days for your refill to be completed.          Additional Information About Your Visit        MyChart Information      [email or web address] gives you secure access to your electronic health record. If you see a primary care provider, you can also send messages to your care team and make appointments. If you have questions, please call your primary care clinic.  If you do not have a primary care provider, please call [phone] and they will assist you.        Care EveryWhere ID     This is your Care EveryWhere ID. This could be used by other organizations to access your [facility] medical records  [id number]        Your Vitals Were     Pulse Temperature Height Pulse Oximetry BMI (Body Mass Index)       87 97.5  F (36.4  C) (Oral) 6' 4\" (1.93 m) 96% 28 kg/m2        Blood Pressure from Last 3 Encounters:   05/17/17 122/70   01/30/17 142/85   01/08/16 130/80    Weight from Last 3 Encounters:   05/17/17 230 lb (104.3 kg)   01/30/17 230 lb (104.3 kg)   01/08/16 230 lb (104.3 kg)              We Performed the Following     CBC with platelets     Comprehensive metabolic panel     CRP inflammation     Erythrocyte sedimentation rate auto          Today's Medication Changes          These changes are accurate as of: 5/17/17  2:35 PM.  If you have any questions, ask your nurse or doctor.               Start taking these medicines.        Dose/Directions    ciprofloxacin 500 MG tablet   Commonly known as:  CIPRO   Used for:  Diverticulitis of large intestine without perforation or abscess without bleeding   Started by:  Bonilla Guzmán MD        Dose:  500 mg   Take 1 tablet (500 mg) by mouth 2 times daily   Quantity:  20 tablet   Refills:  0       metroNIDAZOLE 500 MG tablet   Commonly known as:  FLAGYL   Used for:  Diverticulitis of large intestine without perforation or abscess without bleeding   Started by:  Bonilla Guzmán MD        Dose:  500 mg   Take 1 tablet (500 mg) by mouth 2 times daily   Quantity:  20 tablet   Refills:  0            Where to get your medicines      These medications were sent to [facility] [place] - [place], [place] - [address] [facility]  [address] [address]     Phone:  [phone]     ciprofloxacin 500 MG tablet    metroNIDAZOLE 500 MG tablet         Some of these will need a paper prescription and others can be bought over the counter.  Ask your nurse if you have questions.     Bring a paper prescription for each of these medications     oxyCODONE-acetaminophen 5-325 MG per tablet                Primary Care Provider Office Phone # Fax #    Bonilla Guzmán [phone]       [facility] [address] [place] [address]        Thank you!     Thank you for choosing [facility]  for your care. Our goal is always to provide you with excellent care. Hearing back from our patients is one way we can continue to improve our services. Please take a few minutes to complete the written survey that you may receive in the mail after your visit with us. Thank you!             Your Updated Medication List - Protect others around you: Learn how to safely use, store and throw away your medicines at www.disposemymeds.org.          This list is accurate as of: 5/17/17  2:35 PM.  Always use your most recent med list.                   Brand Name Dispense Instructions for use    aspirin 81 MG tablet      Take  by mouth daily.       ciprofloxacin 500 MG tablet    CIPRO    20 tablet    Take 1 tablet (500 mg) by mouth 2 times daily       fish oil-omega-3 fatty acids 1000 MG capsule     90 capsule    Take 1 capsule by mouth daily.       metroNIDAZOLE 500 MG tablet    FLAGYL    20 tablet    Take 1 tablet (500 mg) by mouth 2 times daily       MULTIVITAMINS PO      Take 1 tablet by mouth daily       oxyCODONE-acetaminophen 5-325 MG per tablet    PERCOCET    20 tablet    Take 1-2 tablets by mouth every 6 hours as needed for pain       pantoprazole 40 MG EC tablet    PROTONIX    90 tablet    Take 1 tablet (40 mg) by mouth daily       PROBIOTIC DAILY PO      Take 1 capsule by  mouth daily

## 2017-05-17 NOTE — TELEPHONE ENCOUNTER
ABDOMINAL PAIN     Onset: yesterday    Description:   Character: Sharp  Location: right lower quadrant  Radiation: None    Intensity: moderate    Progression of Symptoms:  worsening    Accompanying Signs & Symptoms:  Fever/Chills?: YES  Gas/Bloating: YES  Nausea: no   Vomitting: no   Diarrhea?: YES  Constipation:no   Dysuria or Hematuria: no    History:   Trauma: no   Previous similar pain: YES   Previous tests done: CT and Colonoscopy    Precipitating factors:   Does the pain change with:     Food: yes      BM: YES    Urination: no     Alleviating factors:  nothing    Therapies Tried and outcome: no    LMP:  not applicable      Appt made    Iris Aponte RN    [facility]

## 2017-05-23 ENCOUNTER — OFFICE VISIT (OUTPATIENT)
Dept: SLEEP MEDICINE | Facility: CLINIC | Age: 57
End: 2017-05-23
Payer: COMMERCIAL

## 2017-05-23 VITALS
OXYGEN SATURATION: 95 % | DIASTOLIC BLOOD PRESSURE: 73 MMHG | HEIGHT: 76 IN | BODY MASS INDEX: 27.4 KG/M2 | WEIGHT: 225 LBS | RESPIRATION RATE: 12 BRPM | HEART RATE: 77 BPM | SYSTOLIC BLOOD PRESSURE: 110 MMHG

## 2017-05-23 DIAGNOSIS — R06.83 SNORING: ICD-10-CM

## 2017-05-23 DIAGNOSIS — G47.9 SLEEP DISTURBANCE: Primary | ICD-10-CM

## 2017-05-23 DIAGNOSIS — Z72.821 POOR SLEEP HYGIENE: ICD-10-CM

## 2017-05-23 PROCEDURE — 99244 OFF/OP CNSLTJ NEW/EST MOD 40: CPT | Performed by: INTERNAL MEDICINE

## 2017-05-23 NOTE — MR AVS SNAPSHOT
"              After Visit Summary   5/23/2017    Gonzalez Beard    MRN: [id number]           Patient Information     Date Of Birth          1960        Visit Information        Provider Department      5/23/2017 1:30 PM Humberto Hart MD [facility] [place]        Today's Diagnoses     Sleep disturbance    -  1    Snoring          Care Instructions    MY TREATMENT INFORMATION FOR SLEEP DISTURBANCE-  Gonzalez MAE Beard    DOCTOR : Humberto Hart  SLEEP CENTER :  [place]  MY CONTACT NUMBER:[phone]        If I haven't had a sleep study yet, what can I expect?  A personal story from [employer]  [email or web address]/watch?v=AxPLmlRpnCs    Am I having a home sleep study?  Here is a video in case you get home and want to make sure you have done it correctly  [email or web address]/watch?v=PXG7T1gEgs4&feature=youtu.be    Suspected sleep apnea: Sleep study ordered.    Follow up in sleep clinic 1-2 weeks after sleep study to discuss results of sleep study and treatment options.    Patient was advised not to drive if drowsy or sleepy.    Frequently asked questions:  1. What is Obstructive Sleep Apnea (LUIS E)? LUIS E is the most common type of sleep apnea. Apnea literally means, \"without breath.\" It is characterized by repetitive pauses in breathing, despite continued effort to breathe, and is usually associated with a reduction in blood oxygen saturation. Apneas can last 10 to over 60 seconds. It is caused by narrowing or collapse of the upper airway as muscles relax during sleep. Severity of sleep apnea is determined by frequency of breathing events and their effect on your sleep and oxygen levels determined during sleep testing.   2. What are the consequences of LUIS E? Symptoms include: daytime sleepiness- possibly increasing the risk of falling asleep while driving, unrefreshing/restless sleep, snoring, insomnia, waking frequently to urinate, waking with heartburn or reflux, reduced concentration and " memory, and morning headaches. Other health consequences may include development of high blood pressure and other cardiovascular disease in persons who are susceptible. Untreated LUIS E  can contribute to heart disease, stroke and diabetes.   3. What are the treatment options? In most situations, sleep apnea is a lifelong disease that must be managed with daily therapy. Medications are not effective for sleep apnea and surgery is generally not performed until other therapies have been tried. Therapy is usually tailored to the individual patient based on many factors including your wishes as well as severity of sleep apnea and severity of obesity. Continuous Positive Airway (CPAP) is the most reliable treatment. An oral device to hold your jaw forward is usually the next most reliable option. Other options include postioning devices (to keep you off your back), weight loss, and surgery including a tongue pacing device. There is more detail about some of these options below.            1. CPAP-  WHAT DOES IT DO AND HOW CAN I LEARN TO WEAR IT?                               BEFORE I START, CAN I WATCH A MOVIE TO GET A PLAN ON HOW TO USE CPAP?  https://www.[employer].com/watch?i=f6N27gz229Q      Continuous positive airway pressure, or CPAP, is the most effective treatment for obstructive sleep apnea. It works by blowing room air, through a mask, to hold your throat open. A decision to use CPAP is a major step forward in the pursuit of a healthier life. The successful use of CPAP will help you breathe easier, sleep better and live healthier. You can choose CPAP equipment from any durable medical equipment provider that meets your needs.  Using CPAP can be a positive experience if you keep these rodriguez points in mind:  1. Commitment  CPAP is not a quick fix for your problem. It involves a long-term commitment to improve your sleep and your health.    2. Communication  Stay in close communication with both your sleep doctor and your  "CPAP supplier. Ask lots of questions and seek help when you need it.    3. Consistency  Use CPAP all night, every night and for every nap. You will receive the maximum health benefits from CPAP when you use it every time that you sleep. This will also make it easier for your body to adjust to the treatment.    4. Correction  The first machine and mask that you try may not be the best ones for you. Work with your sleep doctor and your CPAP supplier to make corrections to your equipment selection. Ask about trying a different type of machine or mask if you have ongoing problems. Make sure that your mask is a good fit and learn to use your equipment properly.    5. Challenge  Tell a family member or close friend to ask you each morning if you used your CPAP the previous night. Have someone to challenge you to give it your best effort.    6. Connection   Your adjustment to CPAP will be easier if you are able to connect with others who use the same treatment. Ask your sleep doctor if there is a support group in your area for people who have sleep apnea, or look for one on the Internet.  7. Comfort   Increase your level of comfort by using a saline spray, decongestant or heated humidifier if CPAP irritates your nose, mouth or throat. Use your unit's \"ramp\" setting to slowly get used to the air pressure level. There may be soft pads you can buy that will fit over your mask straps. Look on www.CPAP.com for accessories that can help make CPAP use more comfortable.  8. Cleaning   Clean your mask, tubing and headgear on a regular basis. Put this time in your schedule so that you don't forget to do it. Check and replace the filters for your CPAP unit and humidifier.    9. Completion   Although you are never finished with CPAP therapy, you should reward yourself by celebrating the completion of your first month of treatment. Expect this first month to be your hardest period of adjustment. It will involve some trial and error as " you find the machine, mask and pressure settings that are right for you.    10. Continuation  After your first month of treatment, continue to make a daily commitment to use your CPAP all night, every night and for every nap.    CPAP-Tips to starting with success:  Begin using your CPAP for short periods of time during the day while you watch TV or read.    Use CPAP every night and for every nap. Using it less often reduces the health benefits and makes it harder for your body to get used to it.    Make small adjustments to your mask, tubing, straps and headgear until you get the right fit. Tightening the mask may actually worsen the leak.  If it leaves significant marks on your face or irritates the bridge of your nose, it may not be the best mask for you.  Speak with the person who supplied the mask and consider trying other masks. Insurances will allow you to try different masks during the first month of starting CPAP.  Insurance also covers a new mask, hose and filter about every 6 months.    Use a saline nasal spray to ease mild nasal congestion. Neti-Pot or saline nasal rinses may also help. Nasal gel sprays can help reduce nasal dryness.  Biotene mouthwash can be helpful to protect your teeth if you experience frequent dry mouth.  Dry mouth may be a sign of air escaping out of your mouth or out of the mask in the case of a full face mask.  Speak with your provider if you expect that is the case.     Take a nasal decongestant to relieve more severe nasal or sinus congestion.  Do not use Afrin (oxymetazoline) nasal spray more than 3 days in a row.  Speak with your sleep doctor if your nasal congestion is chronic.    Use a heated humidifier that fits your CPAP model to enhance your breathing comfort. Adjust the heat setting up if you get a dry nose or throat, down if you get condensation in the hose or mask.  Position the CPAP lower than you so that any condensation in the hose drains back into the machine  rather than towards the mask.    Try a system that uses nasal pillows if traditional masks give you problems.    Clean your mask, tubing and headgear once a week. Make sure the equipment dries fully.    Regularly check and replace the filters for your CPAP unit and humidifier.    Work closely with your sleep provider and your CPAP supplier to make sure that you have the machine, mask and air pressure setting that works best for you. It is better to stop using it and call your provider to solve problems than to lay awake all night frustrated with the device.    BESIDES CPAP, WHAT OTHER THERAPIES ARE THERE?      Positioning Device  Positioning devices are generally used when sleep apnea is mild and only occurs on your back.This example shows a pillow that straps around the waist. It may be appropriate for those whose sleep study shows milder sleep apnea that occurs primarily when lying flat on one's back. Preliminary studies have shown benefit but effectiveness at home may need to be verified by a home sleep test. These devices are generally not covered by medical insurance.                      Oral Appliance  What is oral appliance therapy?  An oral appliance is a small acrylic device that fits over the upper and lower teeth or tongue (similar to an orthodontic retainer or a mouth guard). This device slightly advances the lower jaw or tongue, which moves the base of the tongue forward, opens the airway, improves breathing and can effectively treat snoring and obstructive sleep apnea sleep apnea. The appliance is fabricated and customized by a qualified dentist with experience in treating snoring and sleep apnea. Oral appliances are usually well tolerated and have relatively high compliance by patients1, 2, 3.  When is an oral appliance indicated?  Oral appliance therapy is recommended as a first-line treatment for patients with primary snoring, mild sleep apnea, and for patients with moderate sleep apnea who prefer  appliance therapy to use of CPAP4, 5. Severity of sleep apnea is determined by sleep testing and is based on the number of respiratory events per hour of sleep.   How successful is oral appliance therapy?  The success rate of oral appliance therapy in patients with mild sleep apnea is 75-80% while in patients with moderate sleep apnea it is 50-70%. The chance of success in patients with severe sleep apnea is 40-50%. The research also shows that oral appliances have a beneficial effect on the cardiovascular health of LUIS E patients at the same magnitude as CPAP therapy7.  Oral appliances should be a second-line treatment in cases of severe sleep apnea, but if not completely successful then a combination therapy utilizing CPAP plus oral appliance therapy may be effective. Oral appliances tend to be effective in a broad range of patients although studies show that the patients who have the highest success are females, younger patients, those with milder disease, and less severe obesity. 3, 6.   The chances of success are lower in patients who have more severe LUIS E, are older, and those who are morbidly obese.     Example of an oral appliance   Finding a dentist that practices dental sleep medicine  Specific training is available through the American Academy of Dental Sleep Medicine for dentists interested in working in the field of sleep. To find a dentist who is educated in the field of sleep and the use of oral appliances, near you, visit the Web site of the American Academy of Dental Sleep Medicine; also see   http://www.accpstorage.org/newOrganization/patients/oralAppliances.pdf  To search for a dentist certified in these practices:  Http://aadsm.org/FindADentist.aspx?1  1. Elizabeth et al. Objectively measured vs self-reported compliance during oral appliance therapy for sleep-disordered breathing. Chest 2013; 144(5): 8850-8270.  2. Sammy et al. Objective measurement of compliance during oral appliance  therapy for sleep-disordered breathing. Thorax 2013; 68(1): 91-96.  3. Timmy, et al. Mandibular advancement devices in 620 men and women with LUIS E and snoring: tolerability and predictors of treatment success. Chest 2004; 125: 1565-0351.  4. Nena et al. Oral appliances for snoring and LUIS E: a review. Sleep 2006; 29: 244-262.  5. Wendi et al. Oral appliance treatment for LUIS E: an update. J Clin Sleep Med 2014; 10(2): 215-227.  6. Navjot et al. Predictors of OSAH treatment outcome. J Dent Res 2007; 86: 0856-4421.    Nasal Valves                 Nasal valves may not be effective if you have frequent nasal congestion or have difficulty breathing through your nose. They may be an option for mild apnea if other options are not well tolerated. The efficacy of these devices is generally less than CPAP or oral appliances.      Weight Loss:    Weight management is a personal decision.  If you are interested in exploring weight loss strategies, the following discussion covers the impact on weight loss on sleep apnea and the approaches that may be successful.    Weight loss decreases severity of sleep apnea in most people with obesity. For those with mild obesity who have developed snoring with weight gain, even 15-30 pound weight loss can improve and occasionally eliminate sleep apnea.  Structured and life-long dietary and health habits are necessary to lose weight and keep healthier weight levels.     Though there may be significant health benefits from weight loss, long-term weight loss is very difficult to achieve- studies show success with dietary management in less than 10% of people. In addition, substantial weight loss may require years of dietary control and may be difficult if patients have severe obesity. In these cases, surgical management may be considered.  Finally, older individuals who have tolerated obesity without health complications may be less likely to benefit from weight loss strategies.     Your BMI is Body mass index is 27.4 kg/(m^2).  Body mass index (BMI) is one way to tell whether you are at a healthy weight, overweight, or obese. It measures your weight in relation to your height.  A BMI of 18.5 to 24.9 is in the healthy range. A person with a BMI of 25 to 29.9 is considered overweight, and someone with a BMI of 30 or greater is considered obese. More than two-thirds of American adults are considered overweight or obese.  Being overweight or obese increases the risk for further weight gain. Excess weight may lead to heart disease and diabetes.  Creating and following plans for healthy eating and physical activity may help you improve your health.  Weight control is part of healthy lifestyle and includes exercise, emotional health, and healthy eating habits. Careful eating habits lifelong are the mainstay of weight control. Though there are significant health benefits from weight loss, long-term weight loss with diet alone may be very difficult to achieve- studies show long-term success with dietary management in less than 10% of people. Attaining a healthy weight may be especially difficult to achieve in those with severe obesity. In some cases, medications, devices and surgical management might be considered.  What can you do?  If you are overweight or obese and are interested in methods for weight loss, you should discuss this with your provider.     Consider reducing daily calorie intake by 500 calories.     Keep a food journal.     Avoiding skipping meals, consider cutting portions instead.    Diet combined with exercise helps maintain muscle while optimizing fat loss. Strength training is particularly important for building and maintaining muscle mass. Exercise helps reduce stress, increase energy, and improves fitness. Increasing exercise without diet control, however, may not burn enough calories to loose weight.       Start walking three days a week 10-20 minutes at a time    Work  towards walking thirty minutes five days a week     Eventually, increase the speed of your walking for 1-2 minutes at time    In addition, we recommend that you review healthy lifestyles and methods for weight loss available through the National Institutes of Health patient information sites:  http://win.niddk.nih.gov/publications/index.htm    And look into health and wellness programs that may be available through your health insurance provider, employer, local community center, or andreea club.    Weight management plan: Patient was referred to their PCP to discuss a diet and exercise plan.    Surgery:    Upper Airway Surgery for LUIS E  Surgery for LUIS E is a second-line treatment option in the management of sleep apnea.  Surgery should be considered for patients who are having a difficult time tolerating CPAP.    Surgery for LUIS E is directed at areas that are responsible for narrowing or complete obstruction of the airway during sleep.  There are a wide range of procedures available to enlarge and/or stabilize the airway to prevent blockage of breathing in the three major areas where it can occur: the palate, tongue, and nasal regions.  Successful surgical treatment depends on the accurate identification of the factors responsible for obstructive sleep apnea in each person.  A personalized approach is required because there is no single treatment that works well for everyone.  Because of anatomic variation, consultation with an examination by a sleep surgeon is a critical first step in determining what surgical options are best for each patient.  In some cases, examination during sedation may be recommended in order to guide the selection of procedures.  Patients will be counseled about risks and benefits as well as the typical recovery course after surgery. Surgery is typically not a cure for a person s LUIS E.  However, surgery will often significantly improve one s LUIS E severity (termed  success rate ).  Even in the  absence of a cure, surgery will decrease the cardiovascular risk associated with OSA7; improve overall quality of life8 (sleepiness, functionality, sleep quality, etc).          Palate Procedures:  Patients with LUIS E often have narrowing of their airway in the region of their tonsils and uvula.  The goals of palate procedures are to widen the airway in this region as well as to help the tissues resist collapse.  Modern palate procedure techniques focus on tissue conservation and soft tissue rearrangement, rather than tissue removal.  Often the uvula is preserved in this procedure. Residual sleep apnea is common in patient after pharyngoplasty with an average reduction in sleep apnea events of 33%2.      Tongue Procedures:  While patients are awake, the muscles that surround the throat are active and keep this region open for breathing. These muscles relax during sleep, allowing the tongue and other structures to collapse and block breathing.  There are several different tongue procedures available.  Selection of a tongue base procedure depends on characteristics seen on physical exam.  Generally, procedures are aimed at removing bulky tissues in this area or preventing the back of the tongue from falling back during sleep.  Success rates for tongue surgery range from 50-62%3.    Hypoglossal Nerve Stimulation:  Hypoglossal nerve stimulation has recently received approval from the [place] Food and Drug Administration for the treatment of obstructive sleep apnea.  This is based on research showing that the system was safe and effective in treating sleep apnea6.  Results showed that the median AHI score decreased 68%, from 29.3 to 9.0. This therapy uses an implant system that senses breathing patterns and delivers mild stimulation to airway muscles, which keeps the airway open during sleep.  The system consists of three fully implanted components: a small generator (similar in size to a pacemaker), a breathing  sensor, and a stimulation lead.  Using a small handheld remote, a patient turns the therapy on before bed and off upon awakening.    Candidates for this device must be greater than 22 years of age, have moderate to severe LUIS E (AHI between 20-65), BMI less than 32, have tried CPAP/oral appliance without success, and have appropriate upper airway anatomy (determined by a sleep endoscopy performed by Dr. Gerardo).    Hypoglossal Nerve Stimulation Pathway:    The sleep surgeon s office will work with the patient through the insurance prior-authorization process (including communications and appeals).    Nasal Procedures:  Nasal obstruction can interfere with nasal breathing during the day and night.  Studies have shown that relief of nasal obstruction can improve the ability of some patients to tolerate positive airway pressure therapy for obstructive sleep apnea1.  Treatment options include medications such as nasal saline, topical corticosteroid and antihistamine sprays, and oral medications such as antihistamines or decongestants. Non-surgical treatments can include external nasal dilators for selected patients. If these are not successful by themselves, surgery can improve the nasal airway either alone or in combination with these other options.      Combination Procedures:  Combination of surgical procedures and other treatments may be recommended, particularly if patients have more than one area of narrowing or persistent positional disease.  The success rate of combination surgery ranges from 66-80%2,3.      1. Danelle GIVENS. The Role of the Nose in Snoring and Obstructive Sleep Apnoea: An Update.  Eur Arch Otorhinolaryngol. 2011; 268: 1365-73.  2.  Peewee SM; Jack JA; Shemar JR; Pallanch JF; Michael MB; Jay SG; Saroj HERBERT. Surgical modifications of the upper airway for obstructive sleep apnea in adults: a systematic review and meta-analysis. SLEEP 2010;33(10):8620-5461. Daniel BERNARD. Hypopharyngeal surgery in  obstructive sleep apnea: an evidence-based medicine review.  Arch Otolaryngol Head Neck Surg. 2006 Feb;132(2):206-13.  3. Ramana YH1, Soto Y, Vahid AV. The efficacy of anatomically based multilevel surgery for obstructive sleep apnea. Otolaryngol Head Neck Surg. 2003 Oct;129(4):327-35.  4. Daniel BERNARD, Goldberg A. Hypopharyngeal Surgery in Obstructive Sleep Apnea: An Evidence-Based Medicine Review. Arch Otolaryngol Head Neck Surg. 2006 Feb;132(2):206-13.  5. Francois LEE et al. Upper-Airway Stimulation for Obstructive Sleep Apnea.  N Engl J Med. 2014 Jan 9;370(2):139-49.  6. Torrey Y et al. Increased Incidence of Cardiovascular Disease in Middle-aged Men with Obstructive Sleep Apnea. Am J Respir Crit Care Med; 2002 166: 159-165  7. Quevedogolden ANDERSEN et al. Studying Life Effects and Effectiveness of Palatopharyngoplasty (SLEEP) study: Subjective Outcomes of Isolated Uvulopalatopharyngoplasty. Otolaryngol Head Neck Surg. 2011; 144: 623-631.    Good Sleep hygiene tips (American Academy of Sleep Medicine):  Maintain a regular sleep-wake routine    Go to bed at the same time. Wake up at the same time. Ideally, your schedule will remain the same (+/- 20 minutes) every night of the week.  Avoid naps if possible    Naps decrease the  Sleep Debt  that is so necessary for easy sleep onset.    Each of us needs a certain amount of sleep per 24-hour period. We need that amount, and we don t need more than that.    When we take naps, it decreases the amount of sleep that we need the next night - which may cause sleep fragmentation and difficulty initiating sleep, and may lead to insomnia.  Don t stay in bed awake for more than 15-20 minutes.    If you find your mind racing, or worrying about not being able to sleep during the middle of the night, get out of bed, and sit in a chair in the dark. Do your mind racing in the chair until you are sleepy, then return to bed. No TV or internet or eat during these periods! That will just stimulate you  more than desired.    If this happens several times during the night, that is OK. Just maintain your regular wake time, and try to avoid naps.  Don t watch TV or read in bed or eat in bed.    When you watch TV or read in bed or eat in bed, you associate the bed with wakefulness.    The bed is reserved for two things - sleep and hanky panky.  Drink caffeinated drinks with caution    The effects of caffeine may last for several hours after ingestion. Caffeine can fragment sleep, and cause difficulty initiating sleep. If you drink caffeine, use it only before noon.    Remember that soda and tea contain caffeine as well.  Avoid inappropriate substances that interfere with sleep    Cigarettes, alcohol, and over-the-counter medications may cause fragmented sleep.  Exercise regularly    Exercise before 2 pm every day. Exercise promotes continuous sleep.    Avoid rigorous exercise before bedtime. Rigorous exercise circulates endorphins into the body which may cause difficulty initiating sleep.   Have a quiet, comfortable bedroom    Set your bedroom thermostat at a comfortable temperature. Generally, a little cooler is better than a little warmer.    Turn off the TV and other extraneous noise that may disrupt sleep. Background  white noise  like a fan is OK.    If your pets awaken you, keep them outside the bedroom.    Your bedroom should be dark. Turn off bright lights.    Have a comfortable mattress.  If you are a  clock watcher  at night, hide the clock.      Have a comfortable pre-bedtime routine    A warm bath, shower    Meditation, or quiet time    Wind-down 20 minutes prior of bedtime.            Follow-ups after your visit        Your next 10 appointments already scheduled     Jun 28, 2017  1:40 PM CDT   PHYSICAL with Bonilla Guzmán MD   [facility]    [address] [place] [id number] [phone]              Future tests that were ordered for you  "today     Open Future Orders        Priority Expected Expires Ordered    HST-Home Sleep Apnea Test Routine  11/22/2017 5/23/2017            Who to contact     If you have questions or need follow up information about today's clinic visit or your schedule please contact [facility] directly at [phone].  Normal or non-critical lab and imaging results will be communicated to you by MyChart, letter or phone within 4 business days after the clinic has received the results. If you do not hear from us within 7 days, please contact the clinic through Taggablehart or phone. If you have a critical or abnormal lab result, we will notify you by phone as soon as possible.  Submit refill requests through Oonair or call your pharmacy and they will forward the refill request to us. Please allow 3 business days for your refill to be completed.          Additional Information About Your Visit        Taggablehart Information     Oonair gives you secure access to your electronic health record. If you see a primary care provider, you can also send messages to your care team and make appointments. If you have questions, please call your primary care clinic.  If you do not have a primary care provider, please call [phone] and they will assist you.        Care EveryWhere ID     This is your Care EveryWhere ID. This could be used by other organizations to access your [facility] medical records  [id number]        Your Vitals Were     Pulse Respirations Height Pulse Oximetry BMI (Body Mass Index)       77 12 1.93 m (6' 3.98\") 95% 27.4 kg/m2        Blood Pressure from Last 3 Encounters:   05/23/17 110/73   05/17/17 122/70   01/30/17 142/85    Weight from Last 3 Encounters:   05/23/17 102.1 kg (225 lb)   05/17/17 104.3 kg (230 lb)   01/30/17 104.3 kg (230 lb)              We Performed the Following     SLEEP EVALUATION & MANAGEMENT REFERRAL - ADULT        Primary Care Provider Office Phone # Fax #    Bonilla Guzmán MD " [phone]       [facility] [address] [facility] [address]        Thank you!     Thank you for choosing [facility]  for your care. Our goal is always to provide you with excellent care. Hearing back from our patients is one way we can continue to improve our services. Please take a few minutes to complete the written survey that you may receive in the mail after your visit with us. Thank you!             Your Updated Medication List - Protect others around you: Learn how to safely use, store and throw away your medicines at www.disposemymeds.org.          This list is accurate as of: 5/23/17  2:12 PM.  Always use your most recent med list.                   Brand Name Dispense Instructions for use    aspirin 81 MG tablet      Take  by mouth daily.       ciprofloxacin 500 MG tablet    CIPRO    20 tablet    Take 1 tablet (500 mg) by mouth 2 times daily       fish oil-omega-3 fatty acids 1000 MG capsule     90 capsule    Take 1 capsule by mouth daily.       metroNIDAZOLE 500 MG tablet    FLAGYL    20 tablet    Take 1 tablet (500 mg) by mouth 2 times daily       MULTIVITAMINS PO      Take 1 tablet by mouth daily       oxyCODONE-acetaminophen 5-325 MG per tablet    PERCOCET    20 tablet    Take 1-2 tablets by mouth every 6 hours as needed for pain       pantoprazole 40 MG EC tablet    PROTONIX    90 tablet    Take 1 tablet (40 mg) by mouth daily       PROBIOTIC DAILY PO      Take 1 capsule by mouth daily

## 2017-05-23 NOTE — NURSING NOTE
"Chief Complaint   Patient presents with     Consult     Snores per wife,  stops breathing, wakes up gasping.  Can get to sleep but not stay asleep.  gets up to eat.  No SS or pap       Initial /73  Pulse 77  Resp 12  Ht 1.93 m (6' 3.98\")  Wt 102.1 kg (225 lb)  SpO2 95%  BMI 27.4 kg/m2 Estimated body mass index is 27.4 kg/(m^2) as calculated from the following:    Height as of this encounter: 1.93 m (6' 3.98\").    Weight as of this encounter: 102.1 kg (225 lb).  Medication Reconciliation: complete     Neck 46cm  18 1/4in  Ess 10      Elodia Simpson LPN/MA  "

## 2017-05-23 NOTE — PROGRESS NOTES
[facility]  Outpatient Sleep Medicine Consultation  May 23, 2017      Name: Gonzalez Beard MRN# [id number]   Age: 56 year old YOB: 1960     Date of Consultation: May 23, 2017  Consultation is requested by: Bonilla Guzmán MD  [facility]  [address] [place] [address]  Primary care provider: Bonilla Guzmán  [place] clinic: [facility]       Reason for Sleep Consult:     Gonzalez Beard is a 56 year old male nightly witnessed apnea, snoring, gasping, choking, poor quality of sleep and excessive daytime sleepiness and tiredness         Assessment and Plan:     Summary Sleep Diagnoses/Recommendations:    1. Sleep Disturbance:  High suspicion of sleep disordered breathing based on patient's symptoms (snoring, excessive daytime sleepiness, witnessed apneas), high BMI, neck circumference and oropharyngeal examination). Will schedule Home sleep study. We also discussed the pathophysiology of sleep disordered breathing and the importance of treating it if S/he should have it. Patient is advised not to drive if he/she feels drowsy or sleepy.  Maintenance insomnia may be due to untreated sleep disordered breathing.  Follow up after sleep study to discuss the result of sleep study and treatment options.    2.  Poor sleep hygiene:   - We instructed patient to develop regular sleep-wake schedule and regular sleep habits including regular bedtime and wake time to strengthen circadian rhythm, to sleep as much as needed to feel refreshed, not to spend more time in bed than needed. Bedroom should be dark, cool and quiet.  - We also asked patient to avoid napping during the day, forcing yourself to sleep, taking problems to bed, strenuous activity just before bedtime, use of caffeine, alcohol or tobacco just before bedtime, reading, eating or watching TV in bed.  Good sleep hygiene and sleep-wake instructions were given.      No orders of the defined types were placed in  this encounter.        Summary Counseling:  See instructions    Counseling included a comprehensive review of diagnostic and therapeutic strategies as well as risks of inadequate therapy.  Educational materials provided in instructions.    All questions were answered.  The patient indicates understanding of the above issues and agrees with the plan set forth.           History of Present Illness:     Gonzalez Beard is a 56 year old male with history of GERD, hyperlipidemia who presents to the [facility] in [place] with complains of sleep disturbance. I was asked to see Mr. Beard regarding snoring by Dr. Guzmán. Patient complains snoring, witnessed apnea, waking up gasping and choking at sleep for several years. Patient had excessive daytime sleepiness and tiredness, he takes nap 5 times a week, use to be 10-15 minutes and now 30-60 minutes. He has nocturnal reflux but no morning headache.  Patient has no difficulty falling asleep but has difficulty staying sleep. He eats when he wakes up a night. His wife was recently diagnosed sleep apnea and prescribed CPAP.    Please see below for sleep ROS details.    PREVIOUS IN- LAB or HOME SLEEP STUDIES:   None     SLEEP-WAKE SCHEDULE:     Gonzalez Beard     -Describes themself as neither a morning or night person;      -ON WEEKDAYS, goes to sleep at 10:30 PM during the week; awakens  6:00 AM with an alarm; falls asleep in 5 minutes; denies difficulty falling asleep.     -ON WEEKENDS, goes to sleep at 12:00 AM and wakes up at 6:00 AM without an alarm; falls asleep in 5 minutes.       -Awakens 3-4 times a night for 20 minutes before falling back to sleep; awakens to uncertain reasons and and then snacking.      -Total sleep time: 7-8 hours per night.    -Naps 5 times/days per week for 30-60 minutes, feels refreshed after naps; takes no inadvertant naps.       BEDTIME ACTIVITIES AND SHIFT WORK:    Gonzalez Beard    -does watch TV in bed and eat in bed and does not use  electronics in bed and read in bed.     -does not do shift work.  He/she works day shifts.       SCALES       SLEEP APNEA: Stopbang score: 6       INSOMNIA:  Insomnia severity score: N/A       SLEEPINESS: [place] sleepiness scale (ESS):  10   Drowsy driving/near accidents: No          PHQ9: N/A    SLEEP COMPLAINTS:   Snoring- 7 days/week  Witness apnea: Yes  Gasping/Choking: Yes  Excessive daytime sleep: Yes  Toss/turn: Yes  Excessive tiredness/fatigue:  Yes  Morning headaches: No  Dry mouth/throat: Yes  Dyspnea: No  Coexisting Lung disease: No    Coexisting Heart disease: No    Does patient have a bed partner: Yes  Has bed partner been sleeping separately because of snoring:  No            RLS Screen: When you try to relax in the evening or sleep at  night, do you ever have unpleasant, restless feelings in your  legs that can be relieved by walking or movement? Yes, very rarely    Periodic limb movement: No    Narcolepsy:       denies sudden urges of sleep attacks     denies cataplexy     denies sleep paralysis      denies hallucinations     Sleep Behaviors:     denies leg symptoms/movements     denies motor restlessness     denies night terrors     Yes bruxism, sometimes uses mouth guard     denies automatic behaviors    Other subjective complaints:     denies anxiety or rumination      denies pain and discomfort at  night     denies waking up with heart pounding or racing     Yes GERD/heartburn         Parasomnia:   NREM - denies recurrent persistent confusional arousal, night eating, sleep walking or sleep terrors   REM  - denies dream enactment; injuries     Safety: None             Medications:     Current Outpatient Prescriptions   Medication Sig     ciprofloxacin (CIPRO) 500 MG tablet Take 1 tablet (500 mg) by mouth 2 times daily     metroNIDAZOLE (FLAGYL) 500 MG tablet Take 1 tablet (500 mg) by mouth 2 times daily     oxyCODONE-acetaminophen (PERCOCET) 5-325 MG per tablet Take 1-2 tablets by mouth every 6  hours as needed for pain     pantoprazole (PROTONIX) 40 MG EC tablet Take 1 tablet (40 mg) by mouth daily     Probiotic Product (PROBIOTIC DAILY PO) Take 1 capsule by mouth daily     fish oil-omega-3 fatty acids (OMEGA 3) 1000 MG capsule Take 1 capsule by mouth daily.     aspirin 81 MG tablet Take  by mouth daily.     Multiple Vitamin (MULTIVITAMINS PO) Take 1 tablet by mouth daily      No current facility-administered medications for this visit.         Medication that can affect sleep: oxycodone last week for diverticulitis     No Known Allergies         Past Medical History:     Does not need 02 supplement at night     Past Medical History:   Diagnosis Date     Benign neoplasm of colon 5//08    multiple with diverticulosis - adenomatous, hyperplastic     Diverticulitis of colon 9/07, 1/17     Esophageal reflux 2005     Hyperlipidemia LDL goal <130 1997     Hyperplastic colon polyp 7/09    due 5 yrs     Inguinal hernia with obstruction, without mention of gangrene, unilateral or unspecified, (not specified as recurrent) 9/19/07    right detected on exam      Insomnia, unspecified      Shingles 4/14    rt face/ear     Stricture and stenosis of esophagus                Past Surgical History:    No previous upper airway surgery     Past Surgical History:   Procedure Laterality Date     ESOPHAGOSCOPY, GASTROSCOPY, DUODENOSCOPY (EGD), COMBINED N/A 1/8/2016    Procedure: COMBINED ESOPHAGOSCOPY, GASTROSCOPY, DUODENOSCOPY (EGD);  Surgeon: Reginald Julien MD;  Location:  GI      COLONOSCOPY THRU STOMA W BIOPSY/CAUTERY TUMOR/POLYP/LESION  5/08, 7/09, 7/11    multiple polyps with diverticulosis - due 5 yr      ESOPHAGOSCOPY, DIAGNOSTIC  5/07, 2/09    Dr Elena - reactive gastropathy - with strictures dilated x 2  = 5/07, 6/15/07      stress echo  9/11    normal     UPPER GI ENDOSCOPY  6/15, 1/16    stricture, food caught, [place] - 1/16 normal [facility]            Social History:     Social History   Substance Use  "Topics     Smoking status: Former Smoker     Packs/day: 0.75     Years: 25.00     Types: Cigarettes     Smokeless tobacco: Former User     Quit date: 4/30/2010     Alcohol use 6.0 oz/week     10 Standard drinks or equivalent per week      Comment: 3x week 3-4 drinks         Chemical History:     Tobacco: No     Uses 2 cups/day of coffee. Last caffeine intake is usually before noon    EtOH: Yes  Recreational Drugs: No    Psych Hx:   None    Current dangers to self or others: None           Family History:     Family History   Problem Relation Age of Onset     C.A.D. Father 60     Hypertension Father      CEREBROVASCULAR DISEASE Father 65     Prostate Cancer Father 65     Colon Cancer Brother 63     Coronary Artery Disease Brother 60     bypass x 5 - CHF     DIABETES Brother 50     Breast Cancer No family hx of         Sleep Family Hx:        RLS- No  LUIS E - No  Insomnia - No  Parasomnia - No         Review of Systems:     A complete 10 point review of systems was negative other than HPI or as commented below:   Patient denies chest pain, dyspnea with activity and or rest, wheezing, abdominal pain, n&v, fever, chills, dysuria, leg pain or swelling. Patient is also denies ear pain, sore throat, postnasal drip, running nose, dry cough.      Gonzalez Beard has gained 0-5 pounds in the last year.            Physical Examination:   /73  Pulse 77  Resp 12  Ht 1.93 m (6' 3.98\")  Wt 102.1 kg (225 lb)  SpO2 95%  BMI 27.4 kg/m2     Neck Circumference: 46 cm   Constitutional: . Awake, alert, cooperative, in no apparent distress  Mood: euthymic; affect congruent with full range and intensity.  Attention/Concentration:  Normal   Eyes: Pupils round and reactive. No icterus.  ENT: Mallampati Class: IV.   Tonsillar Stage: 1  hidden by pillars  Clear nasal passages. Enlarged inferior turbinates. No deviated septum.  Oropharynx: No high arched palate. No pharyngeal erythema or exudates, elongated uvula. No lateral " narrowing  Tongue: No macroglossia   Dentition: Good with wear.  Dentures: None  Neck: Supple, no thyroid enlargement.   Cardiovascular: Regular S1 and S2, no gallops or murmurs.   Pulmonary:  Chest symmetric, lungs clear bilaterally and no crackles, wheezes or rales.  Abdomen: Soft, obese, non tender.  Extremities:  No pedal edema.  Muscle/joint: Strength and tone normal   Skin:  No rash or significant lesions.   Neurologic: Alert, oriented x3, no focal neurological deficit.           Data: All pertinent previous laboratory data reviewed     No results found for: PH, PHARTERIAL, PO2, [id number], SAT, PCO2, HCO3, BASEEXCESS, LEÓN, BEB  Lab Results   Component Value Date    TSH 1.20 12/15/2015    TSH 1.77 05/01/2007     Lab Results   Component Value Date     (H) 01/30/2017    GLC 89 12/15/2015     Lab Results   Component Value Date    HGB 14.6 01/30/2017    HGB 16.0 12/15/2015     Lab Results   Component Value Date    BUN 14 01/30/2017    BUN 15 12/15/2015    CR 0.88 01/30/2017    CR 0.88 12/15/2015     Lab Results   Component Value Date    CO2 25 01/30/2017    CO2 27 12/15/2015     No results found for: ISHAAN      Stress Echocardiography: 9/6/2011  The visual ejection fraction is estimated at 60-65%.  No regional wall motion abnormalities noted.    Chest x-ray: No    PFT: No        Copy to: Bonilla Guzmán MD 5/23/2017   [facility] [place] [facility] [address], [place], [place] [address] [phone] Clinic    Total time spent with patient: 42 minutes with this patient today in which 25 minutes was spent in counseling/coordination of care and going over planned testing and recommendations.

## 2017-05-23 NOTE — PATIENT INSTRUCTIONS
"MY TREATMENT INFORMATION FOR SLEEP DISTURBANCE-  Gonzalez Beard    DOCTOR : Humberto MORIN [facility]  SLEEP CENTER :  [place]  MY CONTACT NUMBER:[phone]        If I haven't had a sleep study yet, what can I expect?  A personal story from [employer]  https://[email or web address].com/watch?v=AxPLmlRpnCs    Am I having a home sleep study?  Here is a video in case you get home and want to make sure you have done it correctly  https://www.[employer].com/watch?v=CWJ3K8yLja4&feature=youtu.be    Suspected sleep apnea: Sleep study ordered.    Follow up in sleep clinic 1-2 weeks after sleep study to discuss results of sleep study and treatment options.    Patient was advised not to drive if drowsy or sleepy.    Frequently asked questions:  1. What is Obstructive Sleep Apnea (LUIS E)? LUIS E is the most common type of sleep apnea. Apnea literally means, \"without breath.\" It is characterized by repetitive pauses in breathing, despite continued effort to breathe, and is usually associated with a reduction in blood oxygen saturation. Apneas can last 10 to over 60 seconds. It is caused by narrowing or collapse of the upper airway as muscles relax during sleep. Severity of sleep apnea is determined by frequency of breathing events and their effect on your sleep and oxygen levels determined during sleep testing.   2. What are the consequences of LUIS E? Symptoms include: daytime sleepiness- possibly increasing the risk of falling asleep while driving, unrefreshing/restless sleep, snoring, insomnia, waking frequently to urinate, waking with heartburn or reflux, reduced concentration and memory, and morning headaches. Other health consequences may include development of high blood pressure and other cardiovascular disease in persons who are susceptible. Untreated LUIS E  can contribute to heart disease, stroke and diabetes.   3. What are the treatment options? In most situations, sleep apnea is a lifelong disease that must be managed with daily therapy. " Medications are not effective for sleep apnea and surgery is generally not performed until other therapies have been tried. Therapy is usually tailored to the individual patient based on many factors including your wishes as well as severity of sleep apnea and severity of obesity. Continuous Positive Airway (CPAP) is the most reliable treatment. An oral device to hold your jaw forward is usually the next most reliable option. Other options include postioning devices (to keep you off your back), weight loss, and surgery including a tongue pacing device. There is more detail about some of these options below.            1. CPAP-  WHAT DOES IT DO AND HOW CAN I LEARN TO WEAR IT?                               BEFORE I START, CAN I WATCH A MOVIE TO GET A PLAN ON HOW TO USE CPAP?  https://www.[employer].com/watch?b=c5X14bo588N      Continuous positive airway pressure, or CPAP, is the most effective treatment for obstructive sleep apnea. It works by blowing room air, through a mask, to hold your throat open. A decision to use CPAP is a major step forward in the pursuit of a healthier life. The successful use of CPAP will help you breathe easier, sleep better and live healthier. You can choose CPAP equipment from any durable medical equipment provider that meets your needs.  Using CPAP can be a positive experience if you keep these rodriguez points in mind:  1. Commitment  CPAP is not a quick fix for your problem. It involves a long-term commitment to improve your sleep and your health.    2. Communication  Stay in close communication with both your sleep doctor and your CPAP supplier. Ask lots of questions and seek help when you need it.    3. Consistency  Use CPAP all night, every night and for every nap. You will receive the maximum health benefits from CPAP when you use it every time that you sleep. This will also make it easier for your body to adjust to the treatment.    4. Correction  The first machine and mask that you try  "may not be the best ones for you. Work with your sleep doctor and your CPAP supplier to make corrections to your equipment selection. Ask about trying a different type of machine or mask if you have ongoing problems. Make sure that your mask is a good fit and learn to use your equipment properly.    5. Challenge  Tell a family member or close friend to ask you each morning if you used your CPAP the previous night. Have someone to challenge you to give it your best effort.    6. Connection   Your adjustment to CPAP will be easier if you are able to connect with others who use the same treatment. Ask your sleep doctor if there is a support group in your area for people who have sleep apnea, or look for one on the Internet.  7. Comfort   Increase your level of comfort by using a saline spray, decongestant or heated humidifier if CPAP irritates your nose, mouth or throat. Use your unit's \"ramp\" setting to slowly get used to the air pressure level. There may be soft pads you can buy that will fit over your mask straps. Look on www.CPAP.com for accessories that can help make CPAP use more comfortable.  8. Cleaning   Clean your mask, tubing and headgear on a regular basis. Put this time in your schedule so that you don't forget to do it. Check and replace the filters for your CPAP unit and humidifier.    9. Completion   Although you are never finished with CPAP therapy, you should reward yourself by celebrating the completion of your first month of treatment. Expect this first month to be your hardest period of adjustment. It will involve some trial and error as you find the machine, mask and pressure settings that are right for you.    10. Continuation  After your first month of treatment, continue to make a daily commitment to use your CPAP all night, every night and for every nap.    CPAP-Tips to starting with success:  Begin using your CPAP for short periods of time during the day while you watch TV or read.    Use " CPAP every night and for every nap. Using it less often reduces the health benefits and makes it harder for your body to get used to it.    Make small adjustments to your mask, tubing, straps and headgear until you get the right fit. Tightening the mask may actually worsen the leak.  If it leaves significant marks on your face or irritates the bridge of your nose, it may not be the best mask for you.  Speak with the person who supplied the mask and consider trying other masks. Insurances will allow you to try different masks during the first month of starting CPAP.  Insurance also covers a new mask, hose and filter about every 6 months.    Use a saline nasal spray to ease mild nasal congestion. Neti-Pot or saline nasal rinses may also help. Nasal gel sprays can help reduce nasal dryness.  Biotene mouthwash can be helpful to protect your teeth if you experience frequent dry mouth.  Dry mouth may be a sign of air escaping out of your mouth or out of the mask in the case of a full face mask.  Speak with your provider if you expect that is the case.     Take a nasal decongestant to relieve more severe nasal or sinus congestion.  Do not use Afrin (oxymetazoline) nasal spray more than 3 days in a row.  Speak with your sleep doctor if your nasal congestion is chronic.    Use a heated humidifier that fits your CPAP model to enhance your breathing comfort. Adjust the heat setting up if you get a dry nose or throat, down if you get condensation in the hose or mask.  Position the CPAP lower than you so that any condensation in the hose drains back into the machine rather than towards the mask.    Try a system that uses nasal pillows if traditional masks give you problems.    Clean your mask, tubing and headgear once a week. Make sure the equipment dries fully.    Regularly check and replace the filters for your CPAP unit and humidifier.    Work closely with your sleep provider and your CPAP supplier to make sure that you have  the machine, mask and air pressure setting that works best for you. It is better to stop using it and call your provider to solve problems than to lay awake all night frustrated with the device.    BESIDES CPAP, WHAT OTHER THERAPIES ARE THERE?      Positioning Device  Positioning devices are generally used when sleep apnea is mild and only occurs on your back.This example shows a pillow that straps around the waist. It may be appropriate for those whose sleep study shows milder sleep apnea that occurs primarily when lying flat on one's back. Preliminary studies have shown benefit but effectiveness at home may need to be verified by a home sleep test. These devices are generally not covered by medical insurance.                      Oral Appliance  What is oral appliance therapy?  An oral appliance is a small acrylic device that fits over the upper and lower teeth or tongue (similar to an orthodontic retainer or a mouth guard). This device slightly advances the lower jaw or tongue, which moves the base of the tongue forward, opens the airway, improves breathing and can effectively treat snoring and obstructive sleep apnea sleep apnea. The appliance is fabricated and customized by a qualified dentist with experience in treating snoring and sleep apnea. Oral appliances are usually well tolerated and have relatively high compliance by patients1, 2, 3.  When is an oral appliance indicated?  Oral appliance therapy is recommended as a first-line treatment for patients with primary snoring, mild sleep apnea, and for patients with moderate sleep apnea who prefer appliance therapy to use of CPAP4, 5. Severity of sleep apnea is determined by sleep testing and is based on the number of respiratory events per hour of sleep.   How successful is oral appliance therapy?  The success rate of oral appliance therapy in patients with mild sleep apnea is 75-80% while in patients with moderate sleep apnea it is 50-70%. The chance of  success in patients with severe sleep apnea is 40-50%. The research also shows that oral appliances have a beneficial effect on the cardiovascular health of LUIS E patients at the same magnitude as CPAP therapy7.  Oral appliances should be a second-line treatment in cases of severe sleep apnea, but if not completely successful then a combination therapy utilizing CPAP plus oral appliance therapy may be effective. Oral appliances tend to be effective in a broad range of patients although studies show that the patients who have the highest success are females, younger patients, those with milder disease, and less severe obesity. 3, 6.   The chances of success are lower in patients who have more severe LUIS E, are older, and those who are morbidly obese.     Example of an oral appliance   Finding a dentist that practices dental sleep medicine  Specific training is available through the American Academy of Dental Sleep Medicine for dentists interested in working in the field of sleep. To find a dentist who is educated in the field of sleep and the use of oral appliances, near you, visit the Web site of the American Academy of Dental Sleep Medicine; also see   http://www.accpstorage.org/newOrganization/patients/oralAppliances.pdf  To search for a dentist certified in these practices:  Http://aadsm.org/FindADentist.aspx?1  1. Elizabeth, et al. Objectively measured vs self-reported compliance during oral appliance therapy for sleep-disordered breathing. Chest 2013; 144(5): 8482-8792.  2. Sammy, et al. Objective measurement of compliance during oral appliance therapy for sleep-disordered breathing. Thorax 2013; 68(1): 91-96.  3. Timmy et al. Mandibular advancement devices in 620 men and women with LUIS E and snoring: tolerability and predictors of treatment success. Chest 2004; 125: 8382-8991.  4. Nena, et al. Oral appliances for snoring and LUIS E: a review. Sleep 2006; 29: 244-262.  5. Wendi et al. Oral appliance  treatment for LUIS E: an update. J Clin Sleep Med 2014; 10(2): 215-227.  6. Navjot et al. Predictors of OSAH treatment outcome. J Dent Res 2007; 86: 1011-8097.    Nasal Valves                 Nasal valves may not be effective if you have frequent nasal congestion or have difficulty breathing through your nose. They may be an option for mild apnea if other options are not well tolerated. The efficacy of these devices is generally less than CPAP or oral appliances.      Weight Loss:    Weight management is a personal decision.  If you are interested in exploring weight loss strategies, the following discussion covers the impact on weight loss on sleep apnea and the approaches that may be successful.    Weight loss decreases severity of sleep apnea in most people with obesity. For those with mild obesity who have developed snoring with weight gain, even 15-30 pound weight loss can improve and occasionally eliminate sleep apnea.  Structured and life-long dietary and health habits are necessary to lose weight and keep healthier weight levels.     Though there may be significant health benefits from weight loss, long-term weight loss is very difficult to achieve- studies show success with dietary management in less than 10% of people. In addition, substantial weight loss may require years of dietary control and may be difficult if patients have severe obesity. In these cases, surgical management may be considered.  Finally, older individuals who have tolerated obesity without health complications may be less likely to benefit from weight loss strategies.    Your BMI is Body mass index is 27.4 kg/(m^2).  Body mass index (BMI) is one way to tell whether you are at a healthy weight, overweight, or obese. It measures your weight in relation to your height.  A BMI of 18.5 to 24.9 is in the healthy range. A person with a BMI of 25 to 29.9 is considered overweight, and someone with a BMI of 30 or greater is considered obese. More  than two-thirds of American adults are considered overweight or obese.  Being overweight or obese increases the risk for further weight gain. Excess weight may lead to heart disease and diabetes.  Creating and following plans for healthy eating and physical activity may help you improve your health.  Weight control is part of healthy lifestyle and includes exercise, emotional health, and healthy eating habits. Careful eating habits lifelong are the mainstay of weight control. Though there are significant health benefits from weight loss, long-term weight loss with diet alone may be very difficult to achieve- studies show long-term success with dietary management in less than 10% of people. Attaining a healthy weight may be especially difficult to achieve in those with severe obesity. In some cases, medications, devices and surgical management might be considered.  What can you do?  If you are overweight or obese and are interested in methods for weight loss, you should discuss this with your provider.     Consider reducing daily calorie intake by 500 calories.     Keep a food journal.     Avoiding skipping meals, consider cutting portions instead.    Diet combined with exercise helps maintain muscle while optimizing fat loss. Strength training is particularly important for building and maintaining muscle mass. Exercise helps reduce stress, increase energy, and improves fitness. Increasing exercise without diet control, however, may not burn enough calories to loose weight.       Start walking three days a week 10-20 minutes at a time    Work towards walking thirty minutes five days a week     Eventually, increase the speed of your walking for 1-2 minutes at time    In addition, we recommend that you review healthy lifestyles and methods for weight loss available through the National Institutes of Health patient information sites:  http://win.niddk.nih.gov/publications/index.htm    And look into health and wellness  programs that may be available through your health insurance provider, employer, local community center, or [employer].    Weight management plan: Patient was referred to their PCP to discuss a diet and exercise plan.    Surgery:    Upper Airway Surgery for LUIS E  Surgery for LUIS E is a second-line treatment option in the management of sleep apnea.  Surgery should be considered for patients who are having a difficult time tolerating CPAP.    Surgery for LUIS E is directed at areas that are responsible for narrowing or complete obstruction of the airway during sleep.  There are a wide range of procedures available to enlarge and/or stabilize the airway to prevent blockage of breathing in the three major areas where it can occur: the palate, tongue, and nasal regions.  Successful surgical treatment depends on the accurate identification of the factors responsible for obstructive sleep apnea in each person.  A personalized approach is required because there is no single treatment that works well for everyone.  Because of anatomic variation, consultation with an examination by a sleep surgeon is a critical first step in determining what surgical options are best for each patient.  In some cases, examination during sedation may be recommended in order to guide the selection of procedures.  Patients will be counseled about risks and benefits as well as the typical recovery course after surgery. Surgery is typically not a cure for a person s LUIS E.  However, surgery will often significantly improve one s LUIS E severity (termed  success rate ).  Even in the absence of a cure, surgery will decrease the cardiovascular risk associated with OSA7; improve overall quality of life8 (sleepiness, functionality, sleep quality, etc).          Palate Procedures:  Patients with LUIS E often have narrowing of their airway in the region of their tonsils and uvula.  The goals of palate procedures are to widen the airway in this region as well as to help  the tissues resist collapse.  Modern palate procedure techniques focus on tissue conservation and soft tissue rearrangement, rather than tissue removal.  Often the uvula is preserved in this procedure. Residual sleep apnea is common in patient after pharyngoplasty with an average reduction in sleep apnea events of 33%2.      Tongue Procedures:  While patients are awake, the muscles that surround the throat are active and keep this region open for breathing. These muscles relax during sleep, allowing the tongue and other structures to collapse and block breathing.  There are several different tongue procedures available.  Selection of a tongue base procedure depends on characteristics seen on physical exam.  Generally, procedures are aimed at removing bulky tissues in this area or preventing the back of the tongue from falling back during sleep.  Success rates for tongue surgery range from 50-62%3.    Hypoglossal Nerve Stimulation:  Hypoglossal nerve stimulation has recently received approval from the [place] Food and Drug Administration for the treatment of obstructive sleep apnea.  This is based on research showing that the system was safe and effective in treating sleep apnea6.  Results showed that the median AHI score decreased 68%, from 29.3 to 9.0. This therapy uses an implant system that senses breathing patterns and delivers mild stimulation to airway muscles, which keeps the airway open during sleep.  The system consists of three fully implanted components: a small generator (similar in size to a pacemaker), a breathing sensor, and a stimulation lead.  Using a small handheld remote, a patient turns the therapy on before bed and off upon awakening.    Candidates for this device must be greater than 22 years of age, have moderate to severe LUIS E (AHI between 20-65), BMI less than 32, have tried CPAP/oral appliance without success, and have appropriate upper airway anatomy (determined by a sleep endoscopy  performed by Dr. Gerardo).    Hypoglossal Nerve Stimulation Pathway:    The sleep surgeon s office will work with the patient through the insurance prior-authorization process (including communications and appeals).    Nasal Procedures:  Nasal obstruction can interfere with nasal breathing during the day and night.  Studies have shown that relief of nasal obstruction can improve the ability of some patients to tolerate positive airway pressure therapy for obstructive sleep apnea1.  Treatment options include medications such as nasal saline, topical corticosteroid and antihistamine sprays, and oral medications such as antihistamines or decongestants. Non-surgical treatments can include external nasal dilators for selected patients. If these are not successful by themselves, surgery can improve the nasal airway either alone or in combination with these other options.      Combination Procedures:  Combination of surgical procedures and other treatments may be recommended, particularly if patients have more than one area of narrowing or persistent positional disease.  The success rate of combination surgery ranges from 66-80%2,3.      1. Danelle GIVENS. The Role of the Nose in Snoring and Obstructive Sleep Apnoea: An Update.  Eur Arch Otorhinolaryngol. 2011; 268: 1365-73.  2.  Peewee SM; Jack JA; Shemar JR; Pallanch JF; Michael MB; Jay SG; Saroj HERBERT. Surgical modifications of the upper airway for obstructive sleep apnea in adults: a systematic review and meta-analysis. SLEEP 2010;33(10):1444-5207. Daniel BERNARD. Hypopharyngeal surgery in obstructive sleep apnea: an evidence-based medicine review.  Arch Otolaryngol Head Neck Surg. 2006 Feb;132(2):206-13.  3. Ramana YH1, Soto Y, Vahid AV. The efficacy of anatomically based multilevel surgery for obstructive sleep apnea. Otolaryngol Head Neck Surg. 2003 Oct;129(4):327-35.  4. Daniel BERNARD, Goldberg A. Hypopharyngeal Surgery in Obstructive Sleep Apnea: An Evidence-Based Medicine  Review. Arch Otolaryngol Head Neck Surg. 2006 Feb;132(2):206-13.  5. Francois PJ et al. Upper-Airway Stimulation for Obstructive Sleep Apnea.  N Engl J Med. 2014 Jan 9;370(2):139-49.  6. Torrey Y et al. Increased Incidence of Cardiovascular Disease in Middle-aged Men with Obstructive Sleep Apnea. Am J Respir Crit Care Med; 2002 166: 159-165  7. Quevedo EM et al. Studying Life Effects and Effectiveness of Palatopharyngoplasty (SLEEP) study: Subjective Outcomes of Isolated Uvulopalatopharyngoplasty. Otolaryngol Head Neck Surg. 2011; 144: 623-631.    Good Sleep hygiene tips (American Academy of Sleep Medicine):  Maintain a regular sleep-wake routine    Go to bed at the same time. Wake up at the same time. Ideally, your schedule will remain the same (+/- 20 minutes) every night of the week.  Avoid naps if possible    Naps decrease the  Sleep Debt  that is so necessary for easy sleep onset.    Each of us needs a certain amount of sleep per 24-hour period. We need that amount, and we don t need more than that.    When we take naps, it decreases the amount of sleep that we need the next night   which may cause sleep fragmentation and difficulty initiating sleep, and may lead to insomnia.  Don t stay in bed awake for more than 15-20 minutes.    If you find your mind racing, or worrying about not being able to sleep during the middle of the night, get out of bed, and sit in a chair in the dark. Do your mind racing in the chair until you are sleepy, then return to bed. No TV or internet or eat during these periods! That will just stimulate you more than desired.    If this happens several times during the night, that is OK. Just maintain your regular wake time, and try to avoid naps.  Don t watch TV or read in bed or eat in bed.    When you watch TV or read in bed or eat in bed, you associate the bed with wakefulness.    The bed is reserved for two things   sleep and hanky panky.  Drink caffeinated drinks with caution    The  effects of caffeine may last for several hours after ingestion. Caffeine can fragment sleep, and cause difficulty initiating sleep. If you drink caffeine, use it only before noon.    Remember that soda and tea contain caffeine as well.  Avoid inappropriate substances that interfere with sleep    Cigarettes, alcohol, and over-the-counter medications may cause fragmented sleep.  Exercise regularly    Exercise before 2 pm every day. Exercise promotes continuous sleep.    Avoid rigorous exercise before bedtime. Rigorous exercise circulates endorphins into the body which may cause difficulty initiating sleep.   Have a quiet, comfortable bedroom    Set your bedroom thermostat at a comfortable temperature. Generally, a little cooler is better than a little warmer.    Turn off the TV and other extraneous noise that may disrupt sleep. Background  white noise  like a fan is OK.    If your pets awaken you, keep them outside the bedroom.    Your bedroom should be dark. Turn off bright lights.    Have a comfortable mattress.  If you are a  clock watcher  at night, hide the clock.      Have a comfortable pre-bedtime routine    A warm bath, shower    Meditation, or quiet time    Wind-down 20 minutes prior of bedtime.

## 2017-06-05 ENCOUNTER — OFFICE VISIT (OUTPATIENT)
Dept: SLEEP MEDICINE | Facility: CLINIC | Age: 57
End: 2017-06-05
Payer: COMMERCIAL

## 2017-06-05 DIAGNOSIS — G47.9 SLEEP DISTURBANCE: Primary | ICD-10-CM

## 2017-06-05 PROCEDURE — G0399 HOME SLEEP TEST/TYPE 3 PORTA: HCPCS | Performed by: INTERNAL MEDICINE

## 2017-06-05 NOTE — MR AVS SNAPSHOT
After Visit Summary   6/5/2017    Gonzalez Beard    MRN: [id number]           Patient Information     Date Of Birth          1960        Visit Information        Provider Department      6/5/2017 3:00 PM [facility]        Today's Diagnoses     Sleep disturbance    -  1       Follow-ups after your visit        Your next 10 appointments already scheduled     Jun 06, 2017 12:00 PM CDT   HST Drop Off with [facility]   [facility] ([facility])    [address]  [facility] [address]   [phone]            Jun 21, 2017  4:00 PM CDT   Return Sleep Patient with Humberto Hart MD   [facility] ([facility])    [address] [place] [id number] [phone]            Jun 28, 2017  1:40 PM CDT   PHYSICAL with Bonilla Guzmán MD   [facility] ([facility])    [address] [place] [id number] [phone]              Who to contact     If you have questions or need follow up information about today's clinic visit or your schedule please contact [facility] directly at [phone].  Normal or non-critical lab and imaging results will be communicated to you by MyChart, letter or phone within 4 business days after the clinic has received the results. If you do not hear from us within 7 days, please contact the clinic through MyChart or phone. If you have a critical or abnormal lab result, we will notify you by phone as soon as possible.  Submit refill requests through [employer] or call your pharmacy and they will forward the refill request to us. Please allow 3 business days for your refill to be completed.          Additional Information About Your Visit        [employer] Information     [employer] gives you secure access to your electronic health record. If you  see a primary care provider, you can also send messages to your care team and make appointments. If you have questions, please call your primary care clinic.  If you do not have a primary care provider, please call [phone] and they will assist you.        Care EveryWhere ID     This is your Care EveryWhere ID. This could be used by other organizations to access your [place] medical records  [id number]         Blood Pressure from Last 3 Encounters:   05/23/17 110/73   05/17/17 122/70   01/30/17 142/85    Weight from Last 3 Encounters:   05/23/17 102.1 kg (225 lb)   05/17/17 104.3 kg (230 lb)   01/30/17 104.3 kg (230 lb)              Today, you had the following     No orders found for display       Primary Care Provider Office Phone # Fax #    Bonilla Guzmán [phone]       [address] [address]        Thank you!     Thank you for choosing [facility]  for your care. Our goal is always to provide you with excellent care. Hearing back from our patients is one way we can continue to improve our services. Please take a few minutes to complete the written survey that you may receive in the mail after your visit with us. Thank you!             Your Updated Medication List - Protect others around you: Learn how to safely use, store and throw away your medicines at www.disposemymeds.org.          This list is accurate as of: 6/5/17  3:30 PM.  Always use your most recent med list.                   Brand Name Dispense Instructions for use    aspirin 81 MG tablet      Take  by mouth daily.       ciprofloxacin 500 MG tablet    CIPRO    20 tablet    Take 1 tablet (500 mg) by mouth 2 times daily       fish oil-omega-3 fatty acids 1000 MG capsule     90 capsule    Take 1 capsule by mouth daily.       metroNIDAZOLE 500 MG tablet    FLAGYL    20 tablet    Take 1 tablet (500 mg) by mouth 2 times daily       MULTIVITAMINS PO      Take 1  tablet by mouth daily       oxyCODONE-acetaminophen 5-325 MG per tablet    PERCOCET    20 tablet    Take 1-2 tablets by mouth every 6 hours as needed for pain       pantoprazole 40 MG EC tablet    PROTONIX    90 tablet    Take 1 tablet (40 mg) by mouth daily       PROBIOTIC DAILY PO      Take 1 capsule by mouth daily

## 2017-06-06 ENCOUNTER — DOCUMENTATION ONLY (OUTPATIENT)
Dept: SLEEP MEDICINE | Facility: CLINIC | Age: 57
End: 2017-06-06

## 2017-06-07 NOTE — PROCEDURES
"  [facility] Report  ===========================    Patient Information:  --------------------  Gonzalez Beard  Patient ID:  [id number]   :  1960      Indication of the sleep study: Gonzalez Beard is a 56 year old male with history of GERD, hyperlipidemia who was seen at the [facility] in [place] with complains of snoring, witnessed apnea, waking up gasping and choking at sleep for several years, excessive daytime sleepiness and tiredness, nocturnal reflux but no morning headache. Ht 1.93 m (6' 3.98\")  Wt 102.1 kg (225 lb)  SpO2 95%  BMI 27.4 kg/m2.      Recording Information:  ----------------------  This was a Type 3 unattended sleep study (measuring flow, effort, heart rate and pulse oximetry) performed at home. Please refer to EPIC procedure for detailed scoring report.   This study was considered adequate based on > 4 hours of quality oximetry and respiratory recording. As specified by the AASM Manual for the Scoring of Sleep and Associated events, version 2.3, Rule VIII.D 1B, 4% oxygen desaturation scoring for hypopneas is used as a standard of care on all home sleep apnea testing.  Recording date:  2017   Recording duration:  599.9 minutes  Time in bed:  359.5 minutes  Estimated sleep efficiency was 67.5 %.   Time spent in supine position:  5.9 % of total bed time  The test quality was: adequate for interpretation  The test duration was: adequate for interpretation  Respiratory Analysis:  ---------------------  AHI: 1.7 /hour  AHI (supine):  2.3 /hour  AHI (non-supine):  1.6 /hour  VERNELL: 2.0 /hour (Number of oxygen desaturations per hour)  Snore index: 5.6 (percentage of time spent snoring versus the total time spent in bed)  Central apnea index:  0.0 / hour    The sleep study demonstrated no sleep disordered breathing. Limited time for supine sleep may underestimate the presence of sleep disordered breathing, in addition there was poor sleep efficiency.       Oximetry " Analysis:  ------------------  Baseline oxygen saturation during sleep was normal.   Lowest oxygen saturation:  63.0 %  Majority of the sleep time spent with oxygen saturation greater than 90%.   18.5% of the total recording time was spent with oxygen saturation less than 90%.   Time Spent oxygen saturation below 88% was 3.8 minutes (there was poor oximeter signal and artifact at some sections of study.    Cardiac Analysis:  -----------------  Maximum pulse rate was 172.0 /minute and minimal pulse rate was 42.0/minute. Time spent above 100 bpm was 0.8 minutes and time spent below 40 bpm was 0.0 minutes.    Diagnosis:  ==========  Primary snoring R06.83      Recommendations:   ================    1. Patient may be a candidate for dental appliance through referral to Sleep Dentistry for the treatment of obstructive sleep apnea and/or socially disruptive snoring.  2. Recommend optimizing wake-sleep schedule and avoiding sleep deprivation.    3. If symtoms persist, would consider in-lab sleep study in future.      Other Recommendations:  ======================  1. Start weight loss program if BMI > 25.    2. Avoid sedating medications, including narcotics, and alcohol, as these may exacerbate sleep apnea and/or underlying respiratory disorders.     3. Avoid sleeping supine position.    4. Avoid driving when drowsy    5. If unable to follow in sleep clinic, then patient should follow with referring physician/primary care doctor.        Electronically signed by:        Humberto Hart MD  Sleep Medicine Physician  [facility].

## 2017-06-27 DIAGNOSIS — K21.9 GASTROESOPHAGEAL REFLUX DISEASE WITHOUT ESOPHAGITIS: ICD-10-CM

## 2017-06-27 NOTE — TELEPHONE ENCOUNTER
Protonix      Last Written Prescription Date: 03/10/2017  Last Fill Quantity: 90,  # refills: 0   Last Office Visit with G, P or [facility] prescribing provider: 05/17/2107                                         Next 5 appointments (look out 90 days)     Jun 28, 2017  1:40 PM CDT   PHYSICAL with Bonilla Guzmán MD   [facility]    [address] [place] [id number] [phone]

## 2017-06-28 ENCOUNTER — OFFICE VISIT (OUTPATIENT)
Dept: FAMILY MEDICINE | Facility: CLINIC | Age: 57
End: 2017-06-28
Payer: COMMERCIAL

## 2017-06-28 VITALS
BODY MASS INDEX: 28.62 KG/M2 | DIASTOLIC BLOOD PRESSURE: 84 MMHG | WEIGHT: 235 LBS | HEIGHT: 76 IN | SYSTOLIC BLOOD PRESSURE: 136 MMHG | HEART RATE: 72 BPM | TEMPERATURE: 97.5 F | OXYGEN SATURATION: 99 %

## 2017-06-28 DIAGNOSIS — Z71.89 ADVANCED DIRECTIVES, COUNSELING/DISCUSSION: ICD-10-CM

## 2017-06-28 DIAGNOSIS — Z11.59 NEED FOR HEPATITIS C SCREENING TEST: ICD-10-CM

## 2017-06-28 DIAGNOSIS — E78.5 HYPERLIPIDEMIA LDL GOAL <130: ICD-10-CM

## 2017-06-28 DIAGNOSIS — Z23 NEED FOR TD VACCINE: ICD-10-CM

## 2017-06-28 DIAGNOSIS — Z51.81 MEDICATION MONITORING ENCOUNTER: ICD-10-CM

## 2017-06-28 DIAGNOSIS — Z12.5 SCREENING FOR PROSTATE CANCER: ICD-10-CM

## 2017-06-28 DIAGNOSIS — Z00.00 ENCOUNTER FOR ROUTINE ADULT HEALTH EXAMINATION WITHOUT ABNORMAL FINDINGS: Primary | ICD-10-CM

## 2017-06-28 DIAGNOSIS — K21.9 GASTROESOPHAGEAL REFLUX DISEASE WITHOUT ESOPHAGITIS: ICD-10-CM

## 2017-06-28 DIAGNOSIS — Z12.11 SCREEN FOR COLON CANCER: ICD-10-CM

## 2017-06-28 DIAGNOSIS — K57.30 DIVERTICULOSIS OF LARGE INTESTINE WITHOUT HEMORRHAGE: ICD-10-CM

## 2017-06-28 LAB
ALBUMIN SERPL-MCNC: 3.6 G/DL (ref 3.4–5)
ALBUMIN UR-MCNC: NEGATIVE MG/DL
ALP SERPL-CCNC: 101 U/L (ref 40–150)
ALT SERPL W P-5'-P-CCNC: 35 U/L (ref 0–70)
ANION GAP SERPL CALCULATED.3IONS-SCNC: 7 MMOL/L (ref 3–14)
APPEARANCE UR: CLEAR
AST SERPL W P-5'-P-CCNC: 25 U/L (ref 0–45)
BILIRUB SERPL-MCNC: 0.5 MG/DL (ref 0.2–1.3)
BILIRUB UR QL STRIP: NEGATIVE
BUN SERPL-MCNC: 11 MG/DL (ref 7–30)
CALCIUM SERPL-MCNC: 8.8 MG/DL (ref 8.5–10.1)
CHLORIDE SERPL-SCNC: 106 MMOL/L (ref 94–109)
CHOLEST SERPL-MCNC: 237 MG/DL
CK SERPL-CCNC: 176 U/L (ref 30–300)
CO2 SERPL-SCNC: 26 MMOL/L (ref 20–32)
COLOR UR AUTO: YELLOW
CREAT SERPL-MCNC: 0.92 MG/DL (ref 0.66–1.25)
CREAT UR-MCNC: 50 MG/DL
ERYTHROCYTE [DISTWIDTH] IN BLOOD BY AUTOMATED COUNT: 12.9 % (ref 10–15)
GFR SERPL CREATININE-BSD FRML MDRD: 85 ML/MIN/1.7M2
GLUCOSE SERPL-MCNC: 96 MG/DL (ref 70–99)
GLUCOSE UR STRIP-MCNC: NEGATIVE MG/DL
HCT VFR BLD AUTO: 42.4 % (ref 40–53)
HCV AB SERPL QL IA: NORMAL
HDLC SERPL-MCNC: 44 MG/DL
HGB BLD-MCNC: 15 G/DL (ref 13.3–17.7)
HGB UR QL STRIP: NEGATIVE
KETONES UR STRIP-MCNC: NEGATIVE MG/DL
LDLC SERPL CALC-MCNC: 146 MG/DL
LEUKOCYTE ESTERASE UR QL STRIP: NEGATIVE
MCH RBC QN AUTO: 29.9 PG (ref 26.5–33)
MCHC RBC AUTO-ENTMCNC: 35.4 G/DL (ref 31.5–36.5)
MCV RBC AUTO: 85 FL (ref 78–100)
MICROALBUMIN UR-MCNC: <5 MG/L
MICROALBUMIN/CREAT UR: NORMAL MG/G CR (ref 0–17)
NITRATE UR QL: NEGATIVE
NONHDLC SERPL-MCNC: 193 MG/DL
PH UR STRIP: 7 PH (ref 5–7)
PLATELET # BLD AUTO: 165 10E9/L (ref 150–450)
POTASSIUM SERPL-SCNC: 4.7 MMOL/L (ref 3.4–5.3)
PROT SERPL-MCNC: 7.1 G/DL (ref 6.8–8.8)
PSA SERPL-ACNC: 1.31 UG/L (ref 0–4)
RBC # BLD AUTO: 5.01 10E12/L (ref 4.4–5.9)
SODIUM SERPL-SCNC: 139 MMOL/L (ref 133–144)
SP GR UR STRIP: 1.01 (ref 1–1.03)
TRIGL SERPL-MCNC: 233 MG/DL
TSH SERPL DL<=0.005 MIU/L-ACNC: 1.33 MU/L (ref 0.4–4)
URN SPEC COLLECT METH UR: NORMAL
UROBILINOGEN UR STRIP-ACNC: 0.2 EU/DL (ref 0.2–1)
WBC # BLD AUTO: 5.6 10E9/L (ref 4–11)

## 2017-06-28 PROCEDURE — 82043 UR ALBUMIN QUANTITATIVE: CPT | Performed by: FAMILY MEDICINE

## 2017-06-28 PROCEDURE — 86803 HEPATITIS C AB TEST: CPT | Performed by: FAMILY MEDICINE

## 2017-06-28 PROCEDURE — 36415 COLL VENOUS BLD VENIPUNCTURE: CPT | Performed by: FAMILY MEDICINE

## 2017-06-28 PROCEDURE — 84443 ASSAY THYROID STIM HORMONE: CPT | Performed by: FAMILY MEDICINE

## 2017-06-28 PROCEDURE — 99396 PREV VISIT EST AGE 40-64: CPT | Mod: 25 | Performed by: FAMILY MEDICINE

## 2017-06-28 PROCEDURE — 90471 IMMUNIZATION ADMIN: CPT | Performed by: FAMILY MEDICINE

## 2017-06-28 PROCEDURE — G0103 PSA SCREENING: HCPCS | Performed by: FAMILY MEDICINE

## 2017-06-28 PROCEDURE — 82550 ASSAY OF CK (CPK): CPT | Performed by: FAMILY MEDICINE

## 2017-06-28 PROCEDURE — 85027 COMPLETE CBC AUTOMATED: CPT | Performed by: FAMILY MEDICINE

## 2017-06-28 PROCEDURE — 81003 URINALYSIS AUTO W/O SCOPE: CPT | Performed by: FAMILY MEDICINE

## 2017-06-28 PROCEDURE — 80053 COMPREHEN METABOLIC PANEL: CPT | Performed by: FAMILY MEDICINE

## 2017-06-28 PROCEDURE — 90714 TD VACC NO PRESV 7 YRS+ IM: CPT | Performed by: FAMILY MEDICINE

## 2017-06-28 PROCEDURE — 80061 LIPID PANEL: CPT | Performed by: FAMILY MEDICINE

## 2017-06-28 RX ORDER — PANTOPRAZOLE SODIUM 40 MG/1
40 TABLET, DELAYED RELEASE ORAL DAILY
Qty: 90 TABLET | Refills: 3 | Status: SHIPPED | OUTPATIENT
Start: 2017-06-28 | End: 2018-06-15

## 2017-06-28 RX ORDER — PANTOPRAZOLE SODIUM 40 MG/1
TABLET, DELAYED RELEASE ORAL
Qty: 90 TABLET | Refills: 0 | Status: SHIPPED | OUTPATIENT
Start: 2017-06-28 | End: 2017-06-28

## 2017-06-28 NOTE — TELEPHONE ENCOUNTER
Pt will be seen today for phx.    Medication is being filled for 1 time refill only due to:  Patient needs to be seen because due for appt and is scheduled today..     Dorothy Lo RN

## 2017-06-28 NOTE — NURSING NOTE
"Chief Complaint   Patient presents with     Physical       Initial /84  Pulse 72  Temp 97.5  F (36.4  C) (Oral)  Ht 6' 4\" (1.93 m)  Wt 235 lb (106.6 kg)  SpO2 99%  BMI 28.61 kg/m2 Estimated body mass index is 28.61 kg/(m^2) as calculated from the following:    Height as of this encounter: 6' 4\" (1.93 m).    Weight as of this encounter: 235 lb (106.6 kg)..  BP completed using cuff size: chauncey Conway MA  "

## 2017-06-28 NOTE — PATIENT INSTRUCTIONS
Follow up with Dr. Hayward regarding colonoscopy records being faxed to [facility]    Follow up for influenza vaccination in the fall    Follow up regarding status of sleep study/sleep apnea       [facility] [place]                        To reach your care team during and after hours:   [phone]  To reach our pharmacy:        [phone]    Clinic Hours                        Our clinic hours are:    Monday   7:30 am to 7:00 pm                  Tuesday through Friday 7:30 am to 5:00 pm                             Saturday   8:00 am to 12:00 pm      Sunday   Closed      Pharmacy Hours                        Our pharmacy hours are:    Monday   8:30 am to 7:00 pm       Tuesday to Friday  8:30 am to 6:00 pm                       Saturday    9:00 am to 1:00 pm              Sunday    Closed              There is also information available at our web site:  [email or web address]    If your provider ordered any lab tests and you do not receive the results within 10 business days, please call the clinic.    If you need a medication refill please contact your pharmacy.  Please allow 2-3 business days for your refill to be completed.    Our clinic offers telephone visits and e visits.  Please ask one of your team members to explain more.      Use BME[employer]hart (secure email communication and access to your chart) to send your primary care provider a message or make an appointment. Ask someone on your Team how to sign up for [employer].  Immunizations                      Immunization History   Administered Date(s) Administered     Influenza Vaccine IM 3yrs+ 4 Valent IIV4 12/08/2015     TDAP Vaccine (Adacel) 05/01/2007        Health Maintenance                         Health Maintenance Due   Topic Date Due     Discuss Advance Directive Planning  07/14/1979     Hepatitis C Screening  07/14/1979     Cholesterol Lab - yearly  12/15/2016     Prostate Test (PSA) - yearly  12/15/2016     Wellness Visit with your Primary Provider -  yearly  12/22/2016     Tetanus Vaccine - every 10 years  05/01/2017       Preventive Health Recommendations  Male Ages 50   64    Yearly exam:             See your health care provider every year in order to  o   Review health changes.   o   Discuss preventive care.    o   Review your medicines if your doctor has prescribed any.     Have a cholesterol test every 5 years, or more frequently if you are at risk for high cholesterol/heart disease.     Have a diabetes test (fasting glucose) every three years. If you are at risk for diabetes, you should have this test more often.     Have a colonoscopy at age 50, or have a yearly FIT test (stool test). These exams will check for colon cancer.      Talk with your health care provider about whether or not a prostate cancer screening test (PSA) is right for you.    You should be tested each year for STDs (sexually transmitted diseases), if you re at risk.     Shots: Get a flu shot each year. Get a tetanus shot every 10 years.     Nutrition:    Eat at least 5 servings of fruits and vegetables daily.     Eat whole-grain bread, whole-wheat pasta and brown rice instead of white grains and rice.     Talk to your provider about Calcium and Vitamin D.     Lifestyle    Exercise for at least 150 minutes a week (30 minutes a day, 5 days a week). This will help you control your weight and prevent disease.     Limit alcohol to one drink per day.     No smoking.     Wear sunscreen to prevent skin cancer.     See your dentist every six months for an exam and cleaning.     See your eye doctor every 1 to 2 years.

## 2017-06-28 NOTE — MR AVS SNAPSHOT
After Visit Summary   6/28/2017    Gonzalez Beard    MRN: [id number]           Patient Information     Date Of Birth          1960        Visit Information        Provider Department      6/28/2017 1:40 PM Bonilla Guzmán MD [facility]        Today's Diagnoses     Encounter for routine adult health examination without abnormal findings    -  1    Diverticulosis of large intestine without hemorrhage        Hyperlipidemia LDL goal <130        Gastroesophageal reflux disease without esophagitis        Screening for prostate cancer        Screen for colon cancer        Medication monitoring encounter        Need for hepatitis C screening test        Need for TD vaccine        Advanced directives, counseling/discussion          Care Instructions    Follow up with Dr. Hayward regarding colonoscopy records being faxed to [facility]    Follow up for influenza vaccination in the fall    Follow up regarding status of sleep study/sleep apnea       [facility]                        To reach your care team during and after hours:   [phone]  To reach our pharmacy:        [phone]    Clinic Hours                        Our clinic hours are:    Monday   7:30 am to 7:00 pm                  Tuesday through Friday 7:30 am to 5:00 pm                             Saturday   8:00 am to 12:00 pm      Sunday   Closed      Pharmacy Hours                        Our pharmacy hours are:    Monday   8:30 am to 7:00 pm       Tuesday to Friday  8:30 am to 6:00 pm                       Saturday    9:00 am to 1:00 pm              Sunday    Closed              There is also information available at our web site:  [email or web address]    If your provider ordered any lab tests and you do not receive the results within 10 business days, please call the clinic.    If you need a medication refill please contact your pharmacy.  Please allow 2-3 business days for your refill to be completed.    Our clinic  offers telephone visits and e visits.  Please ask one of your team members to explain more.      Use [employer] (secure email communication and access to your chart) to send your primary care provider a message or make an appointment. Ask someone on your Team how to sign up for [employer].  Immunizations                      Immunization History   Administered Date(s) Administered     Influenza Vaccine IM 3yrs+ 4 Valent IIV4 12/08/2015     TDAP Vaccine (Adacel) 05/01/2007        Health Maintenance                         Health Maintenance Due   Topic Date Due     Discuss Advance Directive Planning  07/14/1979     Hepatitis C Screening  07/14/1979     Cholesterol Lab - yearly  12/15/2016     Prostate Test (PSA) - yearly  12/15/2016     Wellness Visit with your Primary Provider - yearly  12/22/2016     Tetanus Vaccine - every 10 years  05/01/2017       Preventive Health Recommendations  Male Ages 50 - 64    Yearly exam:             See your health care provider every year in order to  o   Review health changes.   o   Discuss preventive care.    o   Review your medicines if your doctor has prescribed any.     Have a cholesterol test every 5 years, or more frequently if you are at risk for high cholesterol/heart disease.     Have a diabetes test (fasting glucose) every three years. If you are at risk for diabetes, you should have this test more often.     Have a colonoscopy at age 50, or have a yearly FIT test (stool test). These exams will check for colon cancer.      Talk with your health care provider about whether or not a prostate cancer screening test (PSA) is right for you.    You should be tested each year for STDs (sexually transmitted diseases), if you re at risk.     Shots: Get a flu shot each year. Get a tetanus shot every 10 years.     Nutrition:    Eat at least 5 servings of fruits and vegetables daily.     Eat whole-grain bread, whole-wheat pasta and brown rice instead of white grains and rice.     Talk to  your provider about Calcium and Vitamin D.     Lifestyle    Exercise for at least 150 minutes a week (30 minutes a day, 5 days a week). This will help you control your weight and prevent disease.     Limit alcohol to one drink per day.     No smoking.     Wear sunscreen to prevent skin cancer.     See your dentist every six months for an exam and cleaning.     See your eye doctor every 1 to 2 years.            Follow-ups after your visit        Future tests that were ordered for you today     Open Future Orders        Priority Expected Expires Ordered    Fecal colorectal cancer screen (FIT) Routine 7/19/2017 9/20/2017 6/28/2017            Who to contact     If you have questions or need follow up information about today's clinic visit or your schedule please contact [facility] directly at [phone].  Normal or non-critical lab and imaging results will be communicated to you by MyChart, letter or phone within 4 business days after the clinic has received the results. If you do not hear from us within 7 days, please contact the clinic through [employer] or phone. If you have a critical or abnormal lab result, we will notify you by phone as soon as possible.  Submit refill requests through [employer] or call your pharmacy and they will forward the refill request to us. Please allow 3 business days for your refill to be completed.          Additional Information About Your Visit        [employer] Information     [employer] gives you secure access to your electronic health record. If you see a primary care provider, you can also send messages to your care team and make appointments. If you have questions, please call your primary care clinic.  If you do not have a primary care provider, please call [phone] and they will assist you.        Care EveryWhere ID     This is your Care EveryWhere ID. This could be used by other organizations to access your [place] medical records  [id number]        Your Vitals  "Were     Pulse Temperature Height Pulse Oximetry BMI (Body Mass Index)       72 97.5  F (36.4  C) (Oral) 6' 4\" (1.93 m) 99% 28.61 kg/m2        Blood Pressure from Last 3 Encounters:   06/28/17 136/84   05/23/17 110/73   05/17/17 122/70    Weight from Last 3 Encounters:   06/28/17 235 lb (106.6 kg)   05/23/17 225 lb (102.1 kg)   05/17/17 230 lb (104.3 kg)              We Performed the Following     *UA reflex to Microscopic and Culture ([place] [facility] (except [place] and [place])     Albumin Random Urine Quantitative     CBC with platelets     CK total     Comprehensive metabolic panel     Hepatitis C Screen Reflex to HCV RNA Quant and Genotype     Lipid panel reflex to direct LDL     Prostate spec antigen screen     TD PRESERV FREE >=7 YRS ADS IM     TSH with free T4 reflex          Today's Medication Changes          These changes are accurate as of: 6/28/17  2:19 PM.  If you have any questions, ask your nurse or doctor.               These medicines have changed or have updated prescriptions.        Dose/Directions    pantoprazole 40 MG EC tablet   Commonly known as:  PROTONIX   This may have changed:  See the new instructions.   Used for:  Gastroesophageal reflux disease without esophagitis   Changed by:  Bonilla Guzmán MD        Dose:  40 mg   Take 1 tablet (40 mg) by mouth daily   Quantity:  90 tablet   Refills:  3         Stop taking these medicines if you haven't already. Please contact your care team if you have questions.     oxyCODONE-acetaminophen 5-325 MG per tablet   Commonly known as:  PERCOCET   Stopped by:  Bonilla Guzmán MD                Where to get your medicines      These medications were sent to [facility] [place] - [address] [address]     Phone:  [phone]     pantoprazole 40 MG EC tablet                Primary Care Provider Office Phone # Fax #    Bonilla Guzmán [phone]       " [facility] [address] [facility] [address]        Equal Access to Services     COREY WHITE : Hadii aad ku hadvalerianoevelyn Sodario, waaxda luqadaha, qaybta kaalmada adeisabelladanteda, chelita foss sugeyerika nevarezrosalbamariza galindo. So [facility] [phone].    ATENCIÓN: Si habla español, tiene a perales disposición servicios gratuitos de asistencia lingüística. Llame al [phone].    We comply with applicable federal civil rights laws and [place] laws. We do not discriminate on the basis of race, color, national origin, age, disability sex, sexual orientation or gender identity.            Thank you!     Thank you for choosing [facility]  for your care. Our goal is always to provide you with excellent care. Hearing back from our patients is one way we can continue to improve our services. Please take a few minutes to complete the written survey that you may receive in the mail after your visit with us. Thank you!             Your Updated Medication List - Protect others around you: Learn how to safely use, store and throw away your medicines at www.disposemymeds.org.          This list is accurate as of: 6/28/17  2:19 PM.  Always use your most recent med list.                   Brand Name Dispense Instructions for use Diagnosis    aspirin 81 MG tablet      Take  by mouth daily.        fish oil-omega-3 fatty acids 1000 MG capsule     90 capsule    Take 1 capsule by mouth daily.        MULTIVITAMINS PO      Take 1 tablet by mouth daily        pantoprazole 40 MG EC tablet    PROTONIX    90 tablet    Take 1 tablet (40 mg) by mouth daily    Gastroesophageal reflux disease without esophagitis       PROBIOTIC DAILY PO      Take 1 capsule by mouth daily

## 2017-06-28 NOTE — PROGRESS NOTES
SUBJECTIVE:   CC: Gonzalez Beard is an 56 year old male who presents for preventative health visit.     Healthy Habits:    Do you get at least three servings of calcium containing foods daily (dairy, green leafy vegetables, etc.)? yes    Amount of exercise or daily activities, outside of work: none    Problems taking medications regularly No    Medication side effects: No    Have you had an eye exam in the past two years? no    Do you see a dentist twice per year? yes    Do you have sleep apnea, excessive snoring or daytime drowsiness?yes - just had sleep study    Esophageal Reflux -- Protonix 40 mg. Well controlled.     Diverticulitis -- Gonzalez admitted to taking a leftover Percocet this morning for arm/back pain, diagnosed 5/17/17. Probiotic daily.     Insomnia -- He reported that he recently went through a sleep study, but did not have results -- he thinks that he has sleep apnea. Await follow up.    Lipids -- Fish oil supplement.      Recent Labs   Lab Test  12/15/15   0832  03/13/13   1037  08/17/11   0904   CHOL  218*  205*  233*   HDL  37*  30*  33*   LDL  146*  128  133*   TRIG  177*  236*  333*   CHOLHDLRATIO   --   6.9*  7.0*     ED - early concerns    Past/recent records reviewed and discussed for -- family hx, social hx, surgical hx, medications, immunizations, allergies, hx of shingles to left side of face, endoscopies, tobacco use.      Today's PHQ-2 Score:   PHQ-2 ( 1999 Pf[employer]) 12/7/2015 8/17/2011   Q1: Little interest or pleasure in doing things - 0   Q2: Feeling down, depressed or hopeless - 0   PHQ-2 Score - 0   Q1: Little interest or pleasure in doing things Not at all -   Q2: Feeling down, depressed or hopeless Not at all -   PHQ-2 Score 0 -       Abuse: Current or Past(Physical, Sexual or Emotional)- No  Do you feel safe in your environment - Yes    Social History   Substance Use Topics     Smoking status: Former Smoker     Packs/day: 0.75     Years: 25.00     Types: Cigarettes     Smokeless  tobacco: Current User     Last attempt to quit: 4/30/2010      Comment: quit 2010, 1-2 tins per week     Alcohol use 7.2 oz/week     12 Standard drinks or equivalent per week      Comment: 3x week 3-4 drinks     Last PSA:   PSA   Date Value Ref Range Status   12/15/2015 1.07 0 - 4 ug/L Final       Reviewed orders with patient. Reviewed health maintenance and updated orders accordingly - Yes    Reviewed and updated as needed this visit by clinical staff  Tobacco  Allergies  Meds  Med Hx  Surg Hx  Fam Hx  Soc Hx      Reviewed and updated as needed this visit by Provider  Allergies        Health Maintenance     Colonoscopy:  3-5 years, due (last 7/11 - possibly other outside procedure not yet abstracted)   FIT:  Yearly, due              PSA:  Yearly, due (last 12/15)   DEXA:  n/a    Health Maintenance Due   Topic Date Due     HEPATITIS C SCREENING  07/14/1979     LIPID MONITORING Q1 YEAR  12/15/2016     PSA Q1 YR  12/15/2016     TETANUS Q10 YR  05/01/2017       Current Problem List    Patient Active Problem List   Diagnosis     Esophageal reflux     Insomnia     HYPERLIPIDEMIA LDL GOAL <130     Diverticulitis     Shingles     Advanced directives, counseling/discussion       Past Medical History    Past Medical History:   Diagnosis Date     Benign neoplasm of colon 05/2008    multiple with diverticulosis - adenomatous, hyperplastic     Diverticulitis of colon 9/07, 1/17     Esophageal reflux 2005     Hyperlipidemia LDL goal <130 1997     Hyperplastic colon polyp 7/09    due 5 yrs     Inguinal hernia with obstruction, without mention of gangrene, unilateral or unspecified, (not specified as recurrent) 9/19/07    right detected on exam      Insomnia, unspecified      Shingles 4/14    rt face/ear     Stricture and stenosis of esophagus        Past Surgical History    Past Surgical History:   Procedure Laterality Date     ESOPHAGOSCOPY, GASTROSCOPY, DUODENOSCOPY (EGD), COMBINED N/A 1/8/2016    stricture with dilation      [facility] COLONOSCOPY THRU STOMA W BIOPSY/CAUTERY TUMOR/POLYP/LESION  5/08, 7/09, 7/11    multiple polyps with diverticulosis - due 5 yr     [facility] ESOPHAGOSCOPY, DIAGNOSTIC  5/07, 2/09    Dr Elena - reactive gastropathy - with strictures dilated x 2  = 5/07, 6/15/07      stress echo  9/11    normal     UPPER GI ENDOSCOPY  6/15, 1/16    stricture, food caught, [place] - 1/16 normal [facility]       Current Medications    Current Outpatient Prescriptions   Medication Sig Dispense Refill     pantoprazole (PROTONIX) 40 MG EC tablet Take 1 tablet (40 mg) by mouth daily 90 tablet 3     Probiotic Product (PROBIOTIC DAILY PO) Take 1 capsule by mouth daily       fish oil-omega-3 fatty acids (OMEGA 3) 1000 MG capsule Take 1 capsule by mouth daily. 90 capsule 3     aspirin 81 MG tablet Take  by mouth daily.  3     Multiple Vitamin (MULTIVITAMINS PO) Take 1 tablet by mouth daily          Allergies    No Known Allergies    Immunizations    Immunization History   Administered Date(s) Administered     Influenza Vaccine IM 3yrs+ 4 Valent IIV4 12/08/2015     TD (ADULT, 7+) 06/28/2017     TDAP Vaccine (Adacel) 05/01/2007       Family History    Family History   Problem Relation Age of Onset     C.A.D. 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      [employer]     Social History Main Topics     Smoking status: Former Smoker     Packs/day: 0.75     Years: 25.00     Types: Cigarettes     Smokeless tobacco: Current User     Last attempt to quit: 4/30/2010      Comment: quit 2010, 1-2 tins per week     Alcohol use 7.2 oz/week      "12 Standard drinks or equivalent per week      Comment: 3x week 3-4 drinks     Drug use: Yes      Comment: occ marijuana     Sexual activity: Yes     Partners: Female     Other Topics Concern     Caffeine Concern Yes     2 cups, <1 can qd     Exercise Yes     regularly exercising     Seat Belt Yes     Parent/Sibling W/ Cabg, Mi Or Angioplasty Before 65f 55m? No     Social History Narrative       ROS:  Constitutional, HEENT, cardiovascular, pulmonary, GI, , musculoskeletal, neuro, skin, endocrine and psych systems are negative, except as in HPI or otherwise noted     This document serves as a record of the services and decisions personally performed and made by Bonilla Guzmán MD FAAFP. It was created on their behalf by Riccardo Terrell, a trained medical scribe. The creation of this document is based the provider's statements to the medical scribe.  Riccardo Terrell June 28, 2017 1:54 PM      OBJECTIVE:   /84  Pulse 72  Temp 97.5  F (36.4  C) (Oral)  Ht 6' 4\" (1.93 m)  Wt 235 lb (106.6 kg)  SpO2 99%  BMI 28.61 kg/m2  EXAM:  GENERAL: healthy, alert and no distress overweight  EYES: Eyes grossly normal to inspection, PERRL and conjunctivae and sclerae normal  HENT: ear canals and TM's normal upon viewing with otoscope, nose and mouth without ulcers or lesions upon viewing with otoscope  RESP: lungs clear to auscultation - no rales, rhonchi or wheezes  CV: regular rate and rhythm, normal S1 S2, no S3 or S4, no murmur, click or rub, no peripheral edema and peripheral pulses strong  ABDOMEN: soft, nontender, no hepatosplenomegaly, no masses and bowel sounds normal   (male): normal male genitalia without lesions or urethral discharge, no hernia  RECTAL: normal sphincter tone, no rectal masses, prostate 1+ size, smooth, nontender without nodules or masses  MS: no gross musculoskeletal defects noted, no edema  SKIN: no suspicious lesions or rashes to visible skin  BACK: no CVA tenderness, no paralumbar tenderness  NEURO: " mentation intact and speech normal  PSYCH: mentation appears normal, affect normal/bright    ASSESSMENT/PLAN:       ICD-10-CM    1. Encounter for routine adult health examination without abnormal findings Z00.00 Comprehensive metabolic panel     Lipid panel reflex to direct LDL     CK total     CBC with platelets     TSH with free T4 reflex     Prostate spec antigen screen     Albumin Random Urine Quantitative     *UA reflex to Microscopic and Culture ([facility] (except [place] and [place])     Fecal colorectal cancer screen (FIT)     Hepatitis C Screen Reflex to HCV RNA Quant and Genotype   2. Diverticulosis of large intestine without hemorrhage K57.30 CBC with platelets   3. Hyperlipidemia LDL goal <130 E78.5 Comprehensive metabolic panel     Lipid panel reflex to direct LDL     CK total   4. Gastroesophageal reflux disease without esophagitis K21.9 CBC with platelets     pantoprazole (PROTONIX) 40 MG EC tablet   5. Screening for prostate cancer Z12.5 Prostate spec antigen screen   6. Screen for colon cancer Z12.11 Fecal colorectal cancer screen (FIT)   7. Medication monitoring encounter Z51.81 Comprehensive metabolic panel     Lipid panel reflex to direct LDL     CK total     CBC with platelets     TSH with free T4 reflex     Albumin Random Urine Quantitative     *UA reflex to Microscopic and Culture ([facility] (except [place] and [place])   8. Need for hepatitis C screening test Z11.59 Hepatitis C Screen Reflex to HCV RNA Quant and Genotype   9. Need for TD vaccine Z23 TD PRESERV FREE >=7 YRS ADS IM     ADMIN 1st VACCINE   10. Advanced directives, counseling/discussion Z71.89      Discussed treatment/modality options, including risk and benefits, he desires advised alcohol consumption 1oz per day or less, advised aspirin 81 mg po daily, advised 1 multivitamin per day, advised calcium 6265-1422 mg/d and Vitamin D 800-1200 IU/d, advised dentist every 6 months, advised  "diet, exercise, and weight loss, advised opthalmologist every 1-2 years, advised self testicular exam q month, diet discussed, further diagnostic(s), further health care maintenance, further lab(s), OTC meds, immunizations (TD), and observation. All diagnosis above reviewed and noted above, otherwise stable.  See [facility] orders for further details.  Follow up in 1 year(s) and as needed.    Follow up with pt regarding colonoscopy records - Dr. Hayward.     Health Maintenance Due   Topic Date Due     HEPATITIS C SCREENING  07/14/1979     LIPID MONITORING Q1 YEAR  12/15/2016     PSA Q1 YR  12/15/2016     TETANUS Q10 YR  05/01/2017       COUNSELING:  Reviewed preventive health counseling, as reflected in patient instructions    BP Screening:   Last 3 BP Readings:    BP Readings from Last 3 Encounters:   06/28/17 136/84   05/23/17 110/73   05/17/17 122/70       The following was recommended to the patient:  Re-screen BP within a year and recommended lifestyle modifications     reports that he has quit smoking. His smoking use included Cigarettes. He has a 18.75 pack-year smoking history. He reported 1-2 tins of smokeless tobacco a week.     Estimated body mass index is 28.61 kg/(m^2) as calculated from the following:    Height as of this encounter: 6' 4\" (1.93 m).    Weight as of this encounter: 235 lb (106.6 kg).   Weight management plan: Discussed healthy diet and exercise guidelines and patient will follow up in 12 months in clinic to re-evaluate.    Counseling Resources:  ATP IV Guidelines  Pooled Cohorts Equation Calculator  FRAX Risk Assessment  ICSI Preventive Guidelines  Dietary Guidelines for Americans, 2010  USDA's MyPlate  ASA Prophylaxis  Lung CA Screening    The information in this document, created by the medical scribe for me, accurately reflects the services I personally performed and the decisions made by me. I have reviewed and approved this document for accuracy.   Bonilla Guzmán MD FAAFP            Bonilla " MD Ramirez, FAAFP    [address]  [id number] [phone] Fax

## 2017-07-05 DIAGNOSIS — Z00.00 ENCOUNTER FOR ROUTINE ADULT HEALTH EXAMINATION WITHOUT ABNORMAL FINDINGS: ICD-10-CM

## 2017-07-05 DIAGNOSIS — Z12.11 SCREEN FOR COLON CANCER: ICD-10-CM

## 2017-07-05 PROCEDURE — 82274 ASSAY TEST FOR BLOOD FECAL: CPT | Performed by: FAMILY MEDICINE

## 2017-07-12 LAB — HEMOCCULT STL QL IA: POSITIVE

## 2017-07-13 DIAGNOSIS — R19.5 POSITIVE FIT (FECAL IMMUNOCHEMICAL TEST): Primary | ICD-10-CM

## 2017-07-18 ENCOUNTER — TELEPHONE (OUTPATIENT)
Dept: FAMILY MEDICINE | Facility: CLINIC | Age: 57
End: 2017-07-18

## 2017-07-18 ENCOUNTER — OFFICE VISIT (OUTPATIENT)
Dept: SLEEP MEDICINE | Facility: CLINIC | Age: 57
End: 2017-07-18
Payer: COMMERCIAL

## 2017-07-18 VITALS
RESPIRATION RATE: 12 BRPM | HEART RATE: 77 BPM | BODY MASS INDEX: 28.62 KG/M2 | OXYGEN SATURATION: 95 % | WEIGHT: 235 LBS | DIASTOLIC BLOOD PRESSURE: 73 MMHG | SYSTOLIC BLOOD PRESSURE: 115 MMHG | HEIGHT: 76 IN

## 2017-07-18 DIAGNOSIS — R06.83 PRIMARY SNORING: Primary | ICD-10-CM

## 2017-07-18 PROCEDURE — 99213 OFFICE O/P EST LOW 20 MIN: CPT | Performed by: INTERNAL MEDICINE

## 2017-07-18 NOTE — TELEPHONE ENCOUNTER
Pt sent Dr. Guzmán a text message that he would like one of his nurses to call him about questions he has     RN was unable to reach pt     Will attempt tomorrow    Arabella Ontiveros RN, BSN  [facility]

## 2017-07-18 NOTE — NURSING NOTE
"Chief Complaint   Patient presents with     RECHECK     f/u Hst 6/5       Initial /73  Pulse 77  Resp 12  Ht 1.93 m (6' 4\")  Wt 106.6 kg (235 lb)  SpO2 95%  BMI 28.61 kg/m2 Estimated body mass index is 28.61 kg/(m^2) as calculated from the following:    Height as of this encounter: 1.93 m (6' 4\").    Weight as of this encounter: 106.6 kg (235 lb).  Medication Reconciliation: complete         Elodia Simpson LPN/MA  "

## 2017-07-18 NOTE — PATIENT INSTRUCTIONS
Your BMI is Body mass index is 28.61 kg/(m^2).  Weight management is a personal decision.  If you are interested in exploring weight loss strategies, the following discussion covers the approaches that may be successful. Body mass index (BMI) is one way to tell whether you are at a healthy weight, overweight, or obese. It measures your weight in relation to your height.  A BMI of 18.5 to 24.9 is in the healthy range. A person with a BMI of 25 to 29.9 is considered overweight, and someone with a BMI of 30 or greater is considered obese. More than two-thirds of American adults are considered overweight or obese.  Being overweight or obese increases the risk for further weight gain. Excess weight may lead to heart disease and diabetes.  Creating and following plans for healthy eating and physical activity may help you improve your health.  Weight control is part of healthy lifestyle and includes exercise, emotional health, and healthy eating habits. Careful eating habits lifelong are the mainstay of weight control. Though there are significant health benefits from weight loss, long-term weight loss with diet alone may be very difficult to achieve- studies show long-term success with dietary management in less than 10% of people. Attaining a healthy weight may be especially difficult to achieve in those with severe obesity. In some cases, medications, devices and surgical management might be considered.  What can you do?  If you are overweight or obese and are interested in methods for weight loss, you should discuss this with your provider.     Consider reducing daily calorie intake by 500 calories.     Keep a food journal.     Avoiding skipping meals, consider cutting portions instead.    Diet combined with exercise helps maintain muscle while optimizing fat loss. Strength training is particularly important for building and maintaining muscle mass. Exercise helps reduce stress, increase energy, and improves fitness.  Increasing exercise without diet control, however, may not burn enough calories to loose weight.       Start walking three days a week 10-20 minutes at a time    Work towards walking thirty minutes five days a week     Eventually, increase the speed of your walking for 1-2 minutes at time    In addition, we recommend that you review healthy lifestyles and methods for weight loss available through the National Institutes of Health patient information sites:  http://win.niddk.nih.gov/publications/index.htm    And look into health and wellness programs that may be available through your health insurance provider, employer, local community center, or andreea club.    Your blood pressure was checked while you were in clinic today.  Please read the guidelines below about what these numbers mean and what you should do about them.  Your systolic blood pressure is the top number.  This is the pressure when the heart is pumping.  Your diastolic blood pressure is the bottom number.  This is the pressure in between beats.  If your systolic blood pressure is less than 120 and your diastolic blood pressure is less than 80, then your blood pressure is normal. There is nothing more that you need to do about it  If your systolic blood pressure is 120-139 or your diastolic blood pressure is 80-89, your blood pressure may be higher than it should be.  You should have your blood pressure re-checked within a year by a primary care provider.  If your systolic blood pressure is 140 or greater or your diastolic blood pressure is 90 or greater, you may have high blood pressure.  High blood pressure is treatable, but if left untreated over time it can put you at risk for heart attack, stroke, or kidney failure.  You should have your blood pressure re-checked by a primary care provider within the next four weeks.

## 2017-07-18 NOTE — PROGRESS NOTES
Sleep Study Follow-Up Visit:    Date on this visit: 7/18/2017    ASSESSMENT / PLAN:    Primary snoring  Discussed with patient the recent sleep study and treatment options, including oral appliance for the treatment of his socially disrupting snoring. Avoid sleeping supine position and weight loss as below.  Instructions given. Follow up as needed. Advised to set up appointment if his symptoms of excessive daytime sleepiness persists, may consider in lab sleep study.      All questions were answered.  The patient indicates understanding of the above issues and agrees with the plan set forth.    No orders of the defined types were placed in this encounter.      He will follow up as needed.    BRIEF SUMMARY:    Gonzalez Beard is a 57 year old male with history of GERD, hyperlipidemia  comes in today for follow-up of his sleep study done on 6/5/17 at the [facility] for result of sleep study.    Home sleep study: 6/5/17   AHI 1.7/hr (supine 2.3/hr)  VERNELL 2/hr  Snore index 5.6%  Lowest O 2 saturation is 63%  Time spent O 2 saturation below 88% was 3.8 minutes    These findings were reviewed with the patient and copy of the sleep study result was given.    Past medical/surgical history, family history, social history, medications and allergies were reviewed.      Problem List:  Patient Active Problem List    Diagnosis Date Noted     Advanced directives, counseling/discussion 06/28/2017     Advance Care Planning 6/28/2017: ACP Review of Chart / Resources Provided:  Reviewed chart for advance care plan.  Gonzalez Beard has an advance care plan which needs to be updated. Patient states presence of new/updated ACP document. Copy requested  Added by Cyndy Cornejo      rt face/ear       Diverticulitis 04/20/2014     HYPERLIPIDEMIA LDL GOAL <130 10/31/2010     Insomnia      Problem list name updated by automated process. Provider to review       Esophageal reflux 07/21/2005             Medications:  "    Current Outpatient Prescriptions   Medication Sig     pantoprazole (PROTONIX) 40 MG EC tablet Take 1 tablet (40 mg) by mouth daily     Probiotic Product (PROBIOTIC DAILY PO) Take 1 capsule by mouth daily     fish oil-omega-3 fatty acids (OMEGA 3) 1000 MG capsule Take 1 capsule by mouth daily.     aspirin 81 MG tablet Take  by mouth daily.     Multiple Vitamin (MULTIVITAMINS PO) Take 1 tablet by mouth daily      No current facility-administered medications for this visit.           PHYSICAL EXAMINATION:  /73  Pulse 77  Resp 12  Ht 1.93 m (6' 4\")  Wt 106.6 kg (235 lb)  SpO2 95%  BMI 28.61 kg/m2          Data: All pertinent previous laboratory data reviewed     No results found for: PH, PHARTERIAL, PO2, [id number], SAT, PCO2, HCO3, BASEEXCESS, LEÓN, BEB  Lab Results   Component Value Date    TSH 1.33 06/28/2017    TSH 1.20 12/15/2015     Lab Results   Component Value Date    GLC 96 06/28/2017     (H) 01/30/2017     Lab Results   Component Value Date    HGB 15.0 06/28/2017    HGB 14.6 01/30/2017     Lab Results   Component Value Date    BUN 11 06/28/2017    BUN 14 01/30/2017    CR 0.92 06/28/2017    CR 0.88 01/30/2017     No results found for: ISHAAN     Fifteen minutes spent with patient, all of which were spent face-to-face counseling, consulting, coordinating plan of care, going over sleep test results and chart review.          Humberto Hart MD 7/18/2017   [facility]  [facility] [address], [place] [address] [phone] Clinic      Copy to: Bonilla Guzmán    "

## 2017-07-18 NOTE — MR AVS SNAPSHOT
After Visit Summary   7/18/2017    Gonzalez Beard    MRN: [id number]           Patient Information     Date Of Birth          1960        Visit Information        Provider Department      7/18/2017 4:00 PM Humberto Hart MD [facility]        Today's Diagnoses     Primary snoring    -  1      Care Instructions      Your BMI is Body mass index is 28.61 kg/(m^2).  Weight management is a personal decision.  If you are interested in exploring weight loss strategies, the following discussion covers the approaches that may be successful. Body mass index (BMI) is one way to tell whether you are at a healthy weight, overweight, or obese. It measures your weight in relation to your height.  A BMI of 18.5 to 24.9 is in the healthy range. A person with a BMI of 25 to 29.9 is considered overweight, and someone with a BMI of 30 or greater is considered obese. More than two-thirds of American adults are considered overweight or obese.  Being overweight or obese increases the risk for further weight gain. Excess weight may lead to heart disease and diabetes.  Creating and following plans for healthy eating and physical activity may help you improve your health.  Weight control is part of healthy lifestyle and includes exercise, emotional health, and healthy eating habits. Careful eating habits lifelong are the mainstay of weight control. Though there are significant health benefits from weight loss, long-term weight loss with diet alone may be very difficult to achieve- studies show long-term success with dietary management in less than 10% of people. Attaining a healthy weight may be especially difficult to achieve in those with severe obesity. In some cases, medications, devices and surgical management might be considered.  What can you do?  If you are overweight or obese and are interested in methods for weight loss, you should discuss this with your provider.     Consider reducing  daily calorie intake by 500 calories.     Keep a food journal.     Avoiding skipping meals, consider cutting portions instead.    Diet combined with exercise helps maintain muscle while optimizing fat loss. Strength training is particularly important for building and maintaining muscle mass. Exercise helps reduce stress, increase energy, and improves fitness. Increasing exercise without diet control, however, may not burn enough calories to loose weight.       Start walking three days a week 10-20 minutes at a time    Work towards walking thirty minutes five days a week     Eventually, increase the speed of your walking for 1-2 minutes at time    In addition, we recommend that you review healthy lifestyles and methods for weight loss available through the National Institutes of Health patient information sites:  http://win.niddk.nih.gov/publications/index.htm    And look into health and wellness programs that may be available through your health insurance provider, employer, local community center, or andreea club.    Your blood pressure was checked while you were in clinic today.  Please read the guidelines below about what these numbers mean and what you should do about them.  Your systolic blood pressure is the top number.  This is the pressure when the heart is pumping.  Your diastolic blood pressure is the bottom number.  This is the pressure in between beats.  If your systolic blood pressure is less than 120 and your diastolic blood pressure is less than 80, then your blood pressure is normal. There is nothing more that you need to do about it  If your systolic blood pressure is 120-139 or your diastolic blood pressure is 80-89, your blood pressure may be higher than it should be.  You should have your blood pressure re-checked within a year by a primary care provider.  If your systolic blood pressure is 140 or greater or your diastolic blood pressure is 90 or greater, you may have high blood pressure.  High  "blood pressure is treatable, but if left untreated over time it can put you at risk for heart attack, stroke, or kidney failure.  You should have your blood pressure re-checked by a primary care provider within the next four weeks.                          Follow-ups after your visit        Who to contact     If you have questions or need follow up information about today's clinic visit or your schedule please contact [facility] directly at [phone].  Normal or non-critical lab and imaging results will be communicated to you by [employer], letter or phone within 4 business days after the clinic has received the results. If you do not hear from us within 7 days, please contact the clinic through [employer] Â® or phone. If you have a critical or abnormal lab result, we will notify you by phone as soon as possible.  Submit refill requests through [employer] Â® or call your pharmacy and they will forward the refill request to us. Please allow 3 business days for your refill to be completed.          Additional Information About Your Visit        [employer] Â® Information     [employer] Â® gives you secure access to your electronic health record. If you see a primary care provider, you can also send messages to your care team and make appointments. If you have questions, please call your primary care clinic.  If you do not have a primary care provider, please call [phone] and they will assist you.        Care EveryWhere ID     This is your Care EveryWhere ID. This could be used by other organizations to access your [facility] medical records  [id number]        Your Vitals Were     Pulse Respirations Height Pulse Oximetry BMI (Body Mass Index)       77 12 1.93 m (6' 4\") 95% 28.61 kg/m2        Blood Pressure from Last 3 Encounters:   07/18/17 115/73   06/28/17 136/84   05/23/17 110/73    Weight from Last 3 Encounters:   07/18/17 106.6 kg (235 lb)   06/28/17 106.6 kg (235 lb)   05/23/17 102.1 kg (225 lb)            "   Today, you had the following     No orders found for display       Primary Care Provider Office Phone # Fax #    Bonilla Guzmán [phone]       [facility] [address] [facility] [address]        Equal Access to Services     BRIGETTEDREW CHRISTOPHER : Hadii pallavi ku hadvalerianoo Soomaali, waaxda luqadaha, qaybta kaalmada adeegyada, waxay idiin hayaan adeisabella khrosalbash tona galindo. So [facility] [phone].    ATENCIÓN: Si habla español, tiene a perales disposición servicios gratuitos de asistencia lingüística. Llame al [phone].    We comply with applicable federal civil rights laws and [place] laws. We do not discriminate on the basis of race, color, national origin, age, disability sex, sexual orientation or gender identity.            Thank you!     Thank you for choosing [facility]  for your care. Our goal is always to provide you with excellent care. Hearing back from our patients is one way we can continue to improve our services. Please take a few minutes to complete the written survey that you may receive in the mail after your visit with us. Thank you!             Your Updated Medication List - Protect others around you: Learn how to safely use, store and throw away your medicines at www.disposemymeds.org.          This list is accurate as of: 7/18/17  4:19 PM.  Always use your most recent med list.                   Brand Name Dispense Instructions for use Diagnosis    aspirin 81 MG tablet      Take  by mouth daily.        fish oil-omega-3 fatty acids 1000 MG capsule     90 capsule    Take 1 capsule by mouth daily.        MULTIVITAMINS PO      Take 1 tablet by mouth daily        pantoprazole 40 MG EC tablet    PROTONIX    90 tablet    Take 1 tablet (40 mg) by mouth daily    Gastroesophageal reflux disease without esophagitis       PROBIOTIC DAILY PO      Take 1 capsule by mouth daily

## 2017-07-19 NOTE — TELEPHONE ENCOUNTER
Attempt #2.    Lilian Lo contacted Gonzalez on 07/19/17 and left a message. If patient calls back please contact RN team.  Dorothy Lo RN

## 2017-07-20 NOTE — TELEPHONE ENCOUNTER
Attempt #3. Final attempt.   Pt not at home, was advised to call work or cell per child. Have called all previously and lm.    Cell- Lilian Lo contacted Gonzalez on 07/20/17 and left a message. If patient calls back please contact RN team.  Dorothy Lo RN

## 2017-07-20 NOTE — TELEPHONE ENCOUNTER
Pt notes that their colon screening test, pt was recommended they had a colonoscopy, had one 1/2016.     Surgeon Natalie Hayward wants to talk to TS.      Please call her when you are available.    Dorothy Lo RN

## 2017-07-21 ENCOUNTER — TELEPHONE (OUTPATIENT)
Dept: FAMILY MEDICINE | Facility: CLINIC | Age: 57
End: 2017-07-21

## 2017-07-21 NOTE — TELEPHONE ENCOUNTER
Patient notified by phone.  He verbalized understanding and agrees with plan.    Iris Nova, BS, RN, PHN  [facility]) [phone]

## 2017-07-21 NOTE — TELEPHONE ENCOUNTER
Patient advised to call Dr. Hayward to set up colonoscopy.  He verbalized understanding and agrees with plan.    BONIFACIO Bacon, RN, PHN  [facility]) [phone]

## 2017-07-21 NOTE — TELEPHONE ENCOUNTER
Please call Gonzalez I reviewed recent colonoscopy, positive FIT, and FH colon cancer with Dr Hayward - she advises follow up colonoscopy with her - please advise him to call her office and schedule

## 2017-07-21 NOTE — TELEPHONE ENCOUNTER
Per Dr Raza call Gonzalez and let him know he needs to call Dr Bonilla and set up appt for f/u colonoscopy    I left message at home and cell  Lauren Tejeda RN- Triage [employer]

## 2017-08-12 ENCOUNTER — HEALTH MAINTENANCE LETTER (OUTPATIENT)
Age: 57
End: 2017-08-12

## 2017-08-15 ENCOUNTER — TRANSFERRED RECORDS (OUTPATIENT)
Dept: HEALTH INFORMATION MANAGEMENT | Facility: CLINIC | Age: 57
End: 2017-08-15

## 2017-08-18 ENCOUNTER — APPOINTMENT (OUTPATIENT)
Dept: CT IMAGING | Facility: CLINIC | Age: 57
End: 2017-08-18
Attending: EMERGENCY MEDICINE
Payer: COMMERCIAL

## 2017-08-18 ENCOUNTER — HOSPITAL ENCOUNTER (EMERGENCY)
Facility: CLINIC | Age: 57
Discharge: HOME | End: 2017-08-18
Attending: EMERGENCY MEDICINE | Admitting: EMERGENCY MEDICINE
Payer: COMMERCIAL

## 2017-08-18 ENCOUNTER — TELEPHONE (OUTPATIENT)
Dept: FAMILY MEDICINE | Facility: CLINIC | Age: 57
End: 2017-08-18

## 2017-08-18 VITALS
TEMPERATURE: 98 F | SYSTOLIC BLOOD PRESSURE: 125 MMHG | HEART RATE: 80 BPM | OXYGEN SATURATION: 97 % | DIASTOLIC BLOOD PRESSURE: 76 MMHG | RESPIRATION RATE: 18 BRPM

## 2017-08-18 DIAGNOSIS — K57.92 DIVERTICULITIS OF INTESTINE, UNSPECIFIED BLEEDING STATUS, UNSPECIFIED COMPLICATION STATUS, UNSPECIFIED PART OF INTESTINAL TRACT: ICD-10-CM

## 2017-08-18 LAB
ALBUMIN UR-MCNC: NEGATIVE MG/DL
ANION GAP SERPL CALCULATED.3IONS-SCNC: 8 MMOL/L (ref 3–14)
APPEARANCE UR: CLEAR
BASOPHILS # BLD AUTO: 0 10E9/L (ref 0–0.2)
BASOPHILS NFR BLD AUTO: 0.2 %
BILIRUB UR QL STRIP: NEGATIVE
BUN SERPL-MCNC: 13 MG/DL (ref 7–30)
CALCIUM SERPL-MCNC: 8.8 MG/DL (ref 8.5–10.1)
CHLORIDE SERPL-SCNC: 102 MMOL/L (ref 94–109)
CO2 SERPL-SCNC: 26 MMOL/L (ref 20–32)
COLOR UR AUTO: NORMAL
CREAT SERPL-MCNC: 0.96 MG/DL (ref 0.66–1.25)
DIFFERENTIAL METHOD BLD: ABNORMAL
EOSINOPHIL # BLD AUTO: 0.1 10E9/L (ref 0–0.7)
EOSINOPHIL NFR BLD AUTO: 1.1 %
ERYTHROCYTE [DISTWIDTH] IN BLOOD BY AUTOMATED COUNT: 12.4 % (ref 10–15)
GFR SERPL CREATININE-BSD FRML MDRD: 81 ML/MIN/1.7M2
GLUCOSE SERPL-MCNC: 100 MG/DL (ref 70–99)
GLUCOSE UR STRIP-MCNC: NEGATIVE MG/DL
HCT VFR BLD AUTO: 44.6 % (ref 40–53)
HGB BLD-MCNC: 15.8 G/DL (ref 13.3–17.7)
HGB UR QL STRIP: NEGATIVE
IMM GRANULOCYTES # BLD: 0.1 10E9/L (ref 0–0.4)
IMM GRANULOCYTES NFR BLD: 0.4 %
KETONES UR STRIP-MCNC: NEGATIVE MG/DL
LEUKOCYTE ESTERASE UR QL STRIP: NEGATIVE
LIPASE SERPL-CCNC: 258 U/L (ref 73–393)
LYMPHOCYTES # BLD AUTO: 0.9 10E9/L (ref 0.8–5.3)
LYMPHOCYTES NFR BLD AUTO: 7.1 %
MCH RBC QN AUTO: 29.8 PG (ref 26.5–33)
MCHC RBC AUTO-ENTMCNC: 35.4 G/DL (ref 31.5–36.5)
MCV RBC AUTO: 84 FL (ref 78–100)
MONOCYTES # BLD AUTO: 1.2 10E9/L (ref 0–1.3)
MONOCYTES NFR BLD AUTO: 9.7 %
NEUTROPHILS # BLD AUTO: 10.4 10E9/L (ref 1.6–8.3)
NEUTROPHILS NFR BLD AUTO: 81.5 %
NITRATE UR QL: NEGATIVE
NRBC # BLD AUTO: 0 10*3/UL
NRBC BLD AUTO-RTO: 0 /100
PH UR STRIP: 5 PH (ref 5–7)
PLATELET # BLD AUTO: 162 10E9/L (ref 150–450)
POTASSIUM SERPL-SCNC: 3.9 MMOL/L (ref 3.4–5.3)
RBC # BLD AUTO: 5.3 10E12/L (ref 4.4–5.9)
RBC #/AREA URNS AUTO: 1 /HPF (ref 0–2)
SODIUM SERPL-SCNC: 136 MMOL/L (ref 133–144)
SOURCE: NORMAL
SP GR UR STRIP: 1 (ref 1–1.03)
UROBILINOGEN UR STRIP-MCNC: 0 MG/DL (ref 0–2)
WBC # BLD AUTO: 12.7 10E9/L (ref 4–11)
WBC #/AREA URNS AUTO: <1 /HPF (ref 0–2)

## 2017-08-18 PROCEDURE — 74177 CT ABD & PELVIS W/CONTRAST: CPT

## 2017-08-18 PROCEDURE — 99285 EMERGENCY DEPT VISIT HI MDM: CPT | Mod: 25

## 2017-08-18 PROCEDURE — 85025 COMPLETE CBC W/AUTO DIFF WBC: CPT | Performed by: EMERGENCY MEDICINE

## 2017-08-18 PROCEDURE — 96361 HYDRATE IV INFUSION ADD-ON: CPT

## 2017-08-18 PROCEDURE — 96376 TX/PRO/DX INJ SAME DRUG ADON: CPT

## 2017-08-18 PROCEDURE — 83690 ASSAY OF LIPASE: CPT | Performed by: EMERGENCY MEDICINE

## 2017-08-18 PROCEDURE — 96374 THER/PROPH/DIAG INJ IV PUSH: CPT

## 2017-08-18 PROCEDURE — 25000128 H RX IP 250 OP 636: Performed by: EMERGENCY MEDICINE

## 2017-08-18 PROCEDURE — 80048 BASIC METABOLIC PNL TOTAL CA: CPT | Performed by: EMERGENCY MEDICINE

## 2017-08-18 PROCEDURE — 81001 URINALYSIS AUTO W/SCOPE: CPT | Performed by: EMERGENCY MEDICINE

## 2017-08-18 RX ORDER — METRONIDAZOLE 500 MG/1
500 TABLET ORAL 2 TIMES DAILY
Qty: 14 TABLET | Refills: 0 | Status: SHIPPED | OUTPATIENT
Start: 2017-08-18 | End: 2017-08-25

## 2017-08-18 RX ORDER — SODIUM CHLORIDE 9 MG/ML
1000 INJECTION, SOLUTION INTRAVENOUS CONTINUOUS
Status: DISCONTINUED | OUTPATIENT
Start: 2017-08-18 | End: 2017-08-18 | Stop reason: HOSPADM

## 2017-08-18 RX ORDER — MORPHINE SULFATE 4 MG/ML
4 INJECTION, SOLUTION INTRAMUSCULAR; INTRAVENOUS
Status: COMPLETED | OUTPATIENT
Start: 2017-08-18 | End: 2017-08-18

## 2017-08-18 RX ORDER — CIPROFLOXACIN 500 MG/1
500 TABLET, FILM COATED ORAL 2 TIMES DAILY
Qty: 28 TABLET | Refills: 0 | Status: SHIPPED | OUTPATIENT
Start: 2017-08-18 | End: 2017-09-14

## 2017-08-18 RX ORDER — IOPAMIDOL 755 MG/ML
500 INJECTION, SOLUTION INTRAVASCULAR ONCE
Status: COMPLETED | OUTPATIENT
Start: 2017-08-18 | End: 2017-08-18

## 2017-08-18 RX ORDER — HYDROCODONE BITARTRATE AND ACETAMINOPHEN 5; 325 MG/1; MG/1
1-2 TABLET ORAL EVERY 4 HOURS PRN
Qty: 15 TABLET | Refills: 0 | Status: SHIPPED | OUTPATIENT
Start: 2017-08-18 | End: 2017-09-14

## 2017-08-18 RX ADMIN — MORPHINE SULFATE 4 MG: 4 INJECTION, SOLUTION INTRAMUSCULAR; INTRAVENOUS at 10:30

## 2017-08-18 RX ADMIN — MORPHINE SULFATE 4 MG: 4 INJECTION, SOLUTION INTRAMUSCULAR; INTRAVENOUS at 10:49

## 2017-08-18 RX ADMIN — IOPAMIDOL 79 ML: 755 INJECTION, SOLUTION INTRAVENOUS at 10:59

## 2017-08-18 RX ADMIN — SODIUM CHLORIDE 1000 ML: 9 INJECTION, SOLUTION INTRAVENOUS at 10:30

## 2017-08-18 RX ADMIN — SODIUM CHLORIDE 65 ML: 9 INJECTION, SOLUTION INTRAVENOUS at 11:11

## 2017-08-18 RX ADMIN — MORPHINE SULFATE 4 MG: 4 INJECTION, SOLUTION INTRAMUSCULAR; INTRAVENOUS at 11:38

## 2017-08-18 ASSESSMENT — ENCOUNTER SYMPTOMS
ABDOMINAL PAIN: 1
HEMATURIA: 0
BLOOD IN STOOL: 0
NAUSEA: 0
FEVER: 0
VOMITING: 0

## 2017-08-18 NOTE — ED AVS SNAPSHOT
[facility] Emergency Department    [address]    [facility] [address]    Phone:  [phone]    Fax:  [phone]                                       Gonzalez Beard   MRN: [id number]    Department:  [facility] Emergency Department   Date of Visit:  8/18/2017           After Visit Summary Signature Page     I have received my discharge instructions, and my questions have been answered. I have discussed any challenges I see with this plan with the nurse or doctor.    ..........................................................................................................................................  Patient/Patient Representative Signature      ..........................................................................................................................................  Patient Representative Print Name and Relationship to Patient    ..................................................               ................................................  Date                                            Time    ..........................................................................................................................................  Reviewed by Signature/Title    ...................................................              ..............................................  Date                                                            Time

## 2017-08-18 NOTE — TELEPHONE ENCOUNTER
[facility] called back pt and pt is headed to the ER now for evaluation and recommendations. The patient indicates understanding of these issues and agrees with the plan.    Dr. Hayward huddled with Dania and if we could get the CT early then this could be advised. Dania [phone]. Called them and let them know pt is going to ER.      Dorothy Lo RN

## 2017-08-18 NOTE — ED PROVIDER NOTES
History     Chief Complaint:  Abdominal Pain      The history is provided by the patient.      Gonzalez Beard is a 57 year old male with history of diverticulitis who presents with abdominal pain. The patient had a colonoscopy 3 days ago, which was clean, and began having lower abdominal pain a day later. He presents to the emergency department today due to the worsening pain, which he rates 5/10 in severity and describes it as a cramping sensation. He says that this coloscopy was his fifth and he has never had pain like this following his previous four. He also notes that his pain is similar to his previous experiences with diverticulitis, the last being in May. He denies any blood in his stools, hematuria, nausea, vomiting, leg swelling, rash, fever, or other medical concerns. He has been taking oxycodone at home with some relief, his last dose was around 0830.    Allergies:  No known drug allergies      Medications:    Protonix    Past Medical History:    Benign neoplasm of colon   Diverticulitis of colon   Esophageal reflux   Hyperlipidemia LDL goal <130   Hyperplastic colon polyp   Inguinal hernia with obstruction, without mention of gangrene, unilateral or unspecified, (not specified as recurrent)   Insomnia, unspecified   Shingles   Stricture and stenosis of esophagus     Past Surgical History:    EGD Combined  Colonoscopy  Esophagoscopy  Stress echo - normal  Upper GI endoscopy    Family History:    CAD  HTN  Cerebrovascular disease  Prostate cancer  DM  Colon cancer    Social History:  Presents with spouse   Tobacco use: Former 0.75 PPD for 25 years. QD 4/30/2010  Alcohol use: 12 drinks per week  PCP: Bonilla Guzmán    Marital Status:        Review of Systems   Constitutional: Negative for fever.   Cardiovascular: Negative for leg swelling.   Gastrointestinal: Positive for abdominal pain. Negative for blood in stool, nausea and vomiting.   Genitourinary: Negative for hematuria.   Skin: Negative for rash.    All other systems reviewed and are negative.    Physical Exam     Patient Vitals for the past 24 hrs:   BP Temp Temp src Pulse Heart Rate Resp SpO2   08/18/17 1130 135/83 - - 80 - - 96 %   08/18/17 1115 - - - - - - 95 %   08/18/17 1045 135/80 - - 80 - - 97 %   08/18/17 1030 126/84 - - - - - 98 %   08/18/17 1015 133/88 98  F (36.7  C) Oral 77 77 18 99 %      Physical Exam  Constitutional: Patient is well appearing. No distress.  Head: Atraumatic.  Mouth/Throat: Oropharynx is clear and moist. No oropharyngeal exudate.  Eyes: Conjunctivae and EOM are normal. No scleral icterus.  Neck: Normal range of motion. Neck supple.   Cardiovascular: Normal rate, regular rhythm, normal heart sounds and intact distal pulses.   Pulmonary/Chest: Breath sounds normal. No respiratory distress.  Abdominal: Soft. Bowel sounds are normal. No distension. No tenderness. No rebound or guarding.   Musculoskeletal: Normal range of motion. No edema or tenderness.   Neurological: Alert and orientated to person, place, and time. No observable focal neuro deficit  Skin: Warm and dry. No rash noted. Not diaphoretic.      Emergency Department Course   Imaging:  Radiographic findings were communicated with the patient who voiced understanding of the findings.  CT Abdomen Pelvis w IV Contrast Only  Impression: Sigmoid colonic diverticulitis is noted. No evidence of abscess or free air at this time.    TAYLOR LAWSON MD    Imaging independently reviewed and agree with radiologist interpretation.     Laboratory:  CBC: WBC 12.7 (H), o/w WNL (HGB 15.8, )   BMP: Glucose 100 (H), o/w WNL (Creatinine 0.96)  UA: Pending  Lipase: 258    Interventions:  1030: NS 1L IV Bolus  1138: Morphine 4mg IV injection     The patient's symptoms were improved with parenteral narcotics.    Emergency Department Course:  IV inserted and blood drawn.   Past medical records, nursing notes, and vitals reviewed.  1015: I performed an exam of the patient and obtained  history, as documented above.      1204: I rechecked the patient. Findings and plan explained to the Patient. Patient discharged home with instructions regarding supportive care, medications, and reasons to return. The importance of close follow-up was reviewed.      I personally reviewed the laboratory results with the Patient and answered all related questions prior to discharge.   Impression & Plan    Medical Decision Making:  No complication of colonscopy appear recurrent diverticulitis empiric treatment pain free at [place].  Abd exam benign.    All questions and concerns were answered. The patient was discharged home and recommended to follow up with his primary physician and return with any new or worsening symptoms.      Diagnosis:    ICD-10-CM   1. Diverticulitis of intestine, unspecified bleeding status, unspecified complication status, unspecified part of intestinal tract K57.92       Disposition:  Discharged to home with plan as outlined above.    Discharge Medications:  New Prescriptions    CIPROFLOXACIN (CIPRO) 500 MG TABLET    Take 1 tablet (500 mg) by mouth 2 times daily    HYDROCODONE-ACETAMINOPHEN (NORCO) 5-325 MG PER TABLET    Take 1-2 tablets by mouth every 4 hours as needed for moderate to severe pain    METRONIDAZOLE (FLAGYL) 500 MG TABLET    Take 1 tablet (500 mg) by mouth 2 times daily for 7 days         Babak Guillen  8/18/2017   [facility] EMERGENCY DEPARTMENT  I, Babak Guillen, am serving as a scribe at 10:15 AM on 8/18/2017 to document services personally performed by Babak Thompson MD based on my observations and the provider's statements to me.       Babak Thompson MD  08/18/17 5801

## 2017-08-18 NOTE — TELEPHONE ENCOUNTER
Spoke with Arturo at Dr. Hayward's office this am regarding the pt after huddle with TS. Pt reported abdominal pain after their recent colonoscopy. The office will call us back and also reach out to the patient this am. [phone].    Triage had already called pt to check on symptoms, reports they still have the pain and would like to know what the next steps are. Pt knows we reached out to Dr. Hayward's office as well and is awaiting recommendations.    Per TS- await recommendations or order CT abd/pelvis with and without contrast and labs- cmp, cbc with diff, ESR.     Will check on patient again this am.    Dorothy Lo RN

## 2017-08-18 NOTE — ED NOTES
Pt had colonoscopy on Tuesday. Started having lower abdominal pain Wednesday, more severe since that time. Procedure done by Natalie Heaton. Pt reports colonscopy was clean. Hx of diverticulitis, pain feels similar to previous episodes, last flare in May. Taking oxycodone for pain with some relief, last dose around 8:30. Denies N/V, or blood in stool.

## 2017-08-18 NOTE — ED AVS SNAPSHOT
[facility] Emergency Department    [address]    [facility] [address]    Phone:  [phone]    Fax:  [phone]                                       Gonzalez Beard   MRN: [id number]    Department:  [facility] Emergency Department   Date of Visit:  8/18/2017           Patient Information     Date Of Birth          1960        Your diagnoses for this visit were:     Diverticulitis of intestine, unspecified bleeding status, unspecified complication status, unspecified part of intestinal tract        You were seen by Babak Thompson MD.      Follow-up Information     Follow up with Bonilla Guzmán MD. Schedule an appointment as soon as possible for a visit in 2 days.    Specialty:  Family Practice    Why:  As needed, If symptoms worsen    Contact information:    [address] [place] [id number]  [phone]          Discharge Instructions         Diverticulitis    Some people get pouches along the wall of the colon as they get older. The pouches, called diverticuli, usually cause no symptoms. If the pouches become blocked, you can get an infection. This infection is called diverticulitis. It causes pain in your lower abdomen and fever. If not treated, it can become a serious condition, causing an abscess to form inside the pouch. The abscess may block the intestinal tract even or rupture, spreading infection throughout the abdomen.  When treatment is started early, oral antibiotics alone may be enough to cure diverticulitis. This method is tried first. But, if you don't improve or if your condition gets worse while using oral antibiotics, you may need to be admitted to the hospital for IV antibiotics. Severe cases may require surgery.  Home care  The following guidelines will help you care for yourself at home:    During the acute illness, rest and follow your healthcare provider's instructions about diet. Sometimes you will need to follow a clear liquid diet  to rest your bowel. Once your symptoms are better, you may be told to follow a low-fiber diet for some time. Include foods like:    Flake cereal, mashed potatoes, pancakes, waffles, pasta, white bread, rice, applesauce, bananas, eggs, fish, poultry, tofu, and cooked soft vegetables    Take antibiotics exactly as instructed. Don't miss any doses or stop taking the medication, even if you feel better.    Monitor your temperature and tell your healthcare provider if you have rising temperatures.  Preventing future attacks  Once you have an episode of diverticulitis, you are at risk for having it again. After you have recovered from this episode, you may be able to lower your risk by eating a high-fiber diet (20 gm/day to 35 gm/day of fiber). This cleans out the colon pouches that already exist and may prevent new ones from forming. Foods high in fiber include fresh fruits and edible peelings, raw or lightly cooked vegetables, whole grain cereals and breads, dried beans and peas, and bran.  Other steps that can help prevent future attacks include:    Take your medicines, such as antibiotics, as your healthcare provider says.    Drink 6 to 8 glasses of water every day, unless told otherwise.    Use a heating pad or hot water bottle to help abdominal cramping or pain.    Begin an exercise program. Ask your healthcare provider how to get started. You can benefit from simple activities such as walking or gardening.    Treat diarrhea with a bland diet. Start with liquids only; then slowly add fiber over time.    Watch for changes in your bowel movements (constipation to diarrhea). Avoid constipation by eating a high fiber diet and taking a stool softener if needed.    Get plenty of rest and sleep.  Follow-up care  Follow up with your healthcare provider as advised or sooner if you are not getting better in the next 2 days.  When to seek medical advice  Call your healthcare provider right away if any of these occur:    Fever  of 100.4 F (38 C) or higher, or as directed by your healthcare provider    Repeated vomiting or swelling of the abdomen    Weakness, dizziness, light-headedness    Pain in your abdomen that gets worse, severe, or spreads to your back    Pain that moves to the right lower abdomen    Rectal bleeding (stools that are red, black or maroon color)    Unexpected vaginal bleeding  Date Last Reviewed: 9/1/2016 2000-2017 [employer]. [address] [place] [address], [place] [address]. All rights reserved. This information is not intended as a substitute for professional medical care. Always follow your healthcare professional's instructions.          24 Hour Appointment Hotline       To make an appointment at any [facility], call [phone] ([phone]). If you don't have a family doctor or clinic, we will help you find one. [facility] clinics are conveniently located to serve the needs of you and your family.             Review of your medicines      START taking        Dose / Directions Last dose taken    ciprofloxacin 500 MG tablet   Commonly known as:  CIPRO   Dose:  500 mg   Quantity:  28 tablet        Take 1 tablet (500 mg) by mouth 2 times daily   Refills:  0        HYDROcodone-acetaminophen 5-325 MG per tablet   Commonly known as:  NORCO   Dose:  1-2 tablet   Quantity:  15 tablet        Take 1-2 tablets by mouth every 4 hours as needed for moderate to severe pain   Refills:  0        metroNIDAZOLE 500 MG tablet   Commonly known as:  FLAGYL   Dose:  500 mg   Quantity:  14 tablet        Take 1 tablet (500 mg) by mouth 2 times daily for 7 days   Refills:  0          Our records show that you are taking the medicines listed below. If these are incorrect, please call your family doctor or clinic.        Dose / Directions Last dose taken    aspirin 81 MG tablet        Take  by mouth daily.   Refills:  3        fish oil-omega-3 fatty acids 1000 MG capsule   Dose:  1 g   Quantity:  90 capsule        Take 1  capsule by mouth daily.   Refills:  3        MULTIVITAMINS PO   Dose:  1 tablet        Take 1 tablet by mouth daily   Refills:  0        pantoprazole 40 MG EC tablet   Commonly known as:  PROTONIX   Dose:  40 mg   Quantity:  90 tablet        Take 1 tablet (40 mg) by mouth daily   Refills:  3        PROBIOTIC DAILY PO   Dose:  1 capsule        Take 1 capsule by mouth daily   Refills:  0                Prescriptions were sent or printed at these locations (3 Prescriptions)                   Other Prescriptions                Printed at Department/Unit printer (3 of 3)         ciprofloxacin (CIPRO) 500 MG tablet               metroNIDAZOLE (FLAGYL) 500 MG tablet               HYDROcodone-acetaminophen (NORCO) 5-325 MG per tablet                Procedures and tests performed during your visit     Basic metabolic panel    CBC with platelets differential    CT Abdomen Pelvis w IV contrast only (Trauma/AAA)    Lipase    UA with Microscopic      Orders Needing Specimen Collection     None      Pending Results     No orders found from 8/16/2017 to 8/19/2017.            Pending Culture Results     No orders found from 8/16/2017 to 8/19/2017.            Pending Results Instructions     If you had any lab results that were not finalized at the time of your Discharge, you can call the ED Lab Result RN at [phone]. You will be contacted by this team for any positive Lab results or changes in treatment. The nurses are available 7 days a week from 10A to 6:30P.  You can leave a message 24 hours per day and they will return your call.        Test Results From Your Hospital Stay        8/18/2017 10:46 AM      Component Results     Component Value Ref Range & Units Status    WBC 12.7 (H) 4.0 - 11.0 10e9/L Final    RBC Count 5.30 4.4 - 5.9 10e12/L Final    Hemoglobin 15.8 13.3 - 17.7 g/dL Final    Hematocrit 44.6 40.0 - 53.0 % Final    MCV 84 78 - 100 fl Final    MCH 29.8 26.5 - 33.0 pg Final    MCHC 35.4 31.5 - 36.5 g/dL Final     RDW 12.4 10.0 - 15.0 % Final    Platelet Count 162 150 - 450 10e9/L Final    Diff Method Automated Method  Final    % Neutrophils 81.5 % Final    % Lymphocytes 7.1 % Final    % Monocytes 9.7 % Final    % Eosinophils 1.1 % Final    % Basophils 0.2 % Final    % Immature Granulocytes 0.4 % Final    Nucleated RBCs 0 0 /100 Final    Absolute Neutrophil 10.4 (H) 1.6 - 8.3 10e9/L Final    Absolute Lymphocytes 0.9 0.8 - 5.3 10e9/L Final    Absolute Monocytes 1.2 0.0 - 1.3 10e9/L Final    Absolute Eosinophils 0.1 0.0 - 0.7 10e9/L Final    Absolute Basophils 0.0 0.0 - 0.2 10e9/L Final    Abs Immature Granulocytes 0.1 0 - 0.4 10e9/L Final    Absolute Nucleated RBC 0.0  Final         8/18/2017 11:00 AM      Component Results     Component Value Ref Range & Units Status    Lipase 258 73 - 393 U/L Final         8/18/2017 11:00 AM      Component Results     Component Value Ref Range & Units Status    Sodium 136 133 - 144 mmol/L Final    Potassium 3.9 3.4 - 5.3 mmol/L Final    Chloride 102 94 - 109 mmol/L Final    Carbon Dioxide 26 20 - 32 mmol/L Final    Anion Gap 8 3 - 14 mmol/L Final    Glucose 100 (H) 70 - 99 mg/dL Final    Urea Nitrogen 13 7 - 30 mg/dL Final    Creatinine 0.96 0.66 - 1.25 mg/dL Final    GFR Estimate 81 >60 mL/min/1.7m2 Final    Non  GFR Calc    GFR Estimate If Black >90 >60 mL/min/1.7m2 Final    African American GFR Calc    Calcium 8.8 8.5 - 10.1 mg/dL Final         8/18/2017 11:23 AM      Narrative     Exam: CT ABDOMEN PELVIS W CONTRAST  8/18/2017 11:12 AM    History: Abdominal pain after colonoscopy.    Comparison: 1/30/2017    Technique: Volumetric acquisition with reconstruction in the axial,  coronal planes the abdomen and pelvis with contrast. Radiation dose  for this scan was reduced using automated exposure control, adjustment  of the mA and/or kV according to patient size, or iterative  reconstruction technique.    Contrast: 79mL Isovue-370    Findings:   Lung Bases: Aces are clear. No  pleural or pericardial effusion.    Abdomen: Liver, spleen, kidneys, adrenal glands, pancreas and  gallbladder are normal.    Sigmoid diverticulosis is again seen with wall thickening noted just  distal to the junction of the descending and sigmoid colon. Trace free  fluid is seen in the pelvis. No loculated fluid collections or free  air are seen. No other areas of bowel wall thickening. No dilated  loops of large or small bowel. Normal appendix.    Bones: No concerning lytic or sclerotic lesions.        Impression     Impression: Sigmoid colonic diverticulitis is noted. No evidence of  abscess or free air at this time.    TAYLOR LAWSON MD         8/18/2017 12:19 PM      Component Results     Component Value Ref Range & Units Status    Color Urine Straw  Final    Appearance Urine Clear  Final    Glucose Urine Negative NEG^Negative mg/dL Final    Bilirubin Urine Negative NEG^Negative Final    Ketones Urine Negative NEG^Negative mg/dL Final    Specific Gravity Urine 1.005 1.003 - 1.035 Final    Blood Urine Negative NEG^Negative Final    pH Urine 5.0 5.0 - 7.0 pH Final    Protein Albumin Urine Negative NEG^Negative mg/dL Final    Urobilinogen mg/dL 0.0 0.0 - 2.0 mg/dL Final    Nitrite Urine Negative NEG^Negative Final    Leukocyte Esterase Urine Negative NEG^Negative Final    Source Midstream Urine  Final    WBC Urine <1 0 - 2 /HPF Final    RBC Urine 1 0 - 2 /HPF Final                Clinical Quality Measure: Blood Pressure Screening     Your blood pressure was checked while you were in the emergency department today. The last reading we obtained was  BP: 135/83 . Please read the guidelines below about what these numbers mean and what you should do about them.  If your systolic blood pressure (the top number) is less than 120 and your diastolic blood pressure (the bottom number) is less than 80, then your blood pressure is normal. There is nothing more that you need to do about it.  If your systolic blood pressure  (the top number) is 120-139 or your diastolic blood pressure (the bottom number) is 80-89, your blood pressure may be higher than it should be. You should have your blood pressure rechecked within a year by a primary care provider.  If your systolic blood pressure (the top number) is 140 or greater or your diastolic blood pressure (the bottom number) is 90 or greater, you may have high blood pressure. High blood pressure is treatable, but if left untreated over time it can put you at risk for heart attack, stroke, or kidney failure. You should have your blood pressure rechecked by a primary care provider within the next 4 weeks.  If your provider in the emergency department today gave you specific instructions to follow-up with your doctor or provider even sooner than that, you should follow that instruction and not wait for up to 4 weeks for your follow-up visit.        Thank you for choosing [facility]       Thank you for choosing [facility] for your care. Our goal is always to provide you with excellent care. Hearing back from our patients is one way we can continue to improve our services. Please take a few minutes to complete the written survey that you may receive in the mail after you visit with us. Thank you!        [facility] Information     [facility] gives you secure access to your electronic health record. If you see a primary care provider, you can also send messages to your care team and make appointments. If you have questions, please call your primary care clinic.  If you do not have a primary care provider, please call [phone] and they will assist you.        Care EveryWhere ID     This is your Care EveryWhere ID. This could be used by other organizations to access your [facility] medical records  [id number]        Equal Access to Services     COREY WHITE : zeina Jack, chelita castro. [facility]  [phone].    ATENCIÓN: Si habla español, tiene a perales disposición servicios gratuitos de asistencia lingüística. Llame al [phone].    We comply with applicable federal civil rights laws and [place] laws. We do not discriminate on the basis of race, color, national origin, age, disability sex, sexual orientation or gender identity.            After Visit Summary       This is your record. Keep this with you and show to your community pharmacist(s) and doctor(s) at your next visit.

## 2017-08-18 NOTE — TELEPHONE ENCOUNTER
Dania with Dr. Hayward's office - advised that they usually have patient's go to the ED.     Message handled by Nurse Triage with [facility] - provider name: MD HOOD - advised that with severe abdominal pain an ED visit would be the best option. But patient is OK to do CT scan with labs if he would like instead.    Called   Telephone Information:   Mobile [phone]     Advised of Dr. Guerrero's recommendation and Dr. Guzmán's agreement above - patient stated that he is in a ton of pain and is going to go to the ED right now.   Patient stated an understanding and agreed with plan.    Diane Woodard RN  [facility]

## 2017-08-30 ENCOUNTER — TRANSFERRED RECORDS (OUTPATIENT)
Dept: HEALTH INFORMATION MANAGEMENT | Facility: CLINIC | Age: 57
End: 2017-08-30

## 2017-09-14 ENCOUNTER — OFFICE VISIT (OUTPATIENT)
Dept: FAMILY MEDICINE | Facility: CLINIC | Age: 57
End: 2017-09-14
Payer: COMMERCIAL

## 2017-09-14 VITALS
HEIGHT: 76 IN | WEIGHT: 230 LBS | SYSTOLIC BLOOD PRESSURE: 122 MMHG | TEMPERATURE: 98.1 F | DIASTOLIC BLOOD PRESSURE: 70 MMHG | BODY MASS INDEX: 28.01 KG/M2 | HEART RATE: 90 BPM | OXYGEN SATURATION: 94 %

## 2017-09-14 DIAGNOSIS — K57.30 DIVERTICULOSIS OF LARGE INTESTINE WITHOUT HEMORRHAGE: ICD-10-CM

## 2017-09-14 DIAGNOSIS — Z01.818 PREOPERATIVE EXAMINATION: Primary | ICD-10-CM

## 2017-09-14 DIAGNOSIS — Z51.81 MEDICATION MONITORING ENCOUNTER: ICD-10-CM

## 2017-09-14 DIAGNOSIS — E78.5 HYPERLIPIDEMIA LDL GOAL <130: ICD-10-CM

## 2017-09-14 DIAGNOSIS — K21.9 GASTROESOPHAGEAL REFLUX DISEASE WITHOUT ESOPHAGITIS: ICD-10-CM

## 2017-09-14 LAB
ALBUMIN SERPL-MCNC: 3.8 G/DL (ref 3.4–5)
ALBUMIN UR-MCNC: NEGATIVE MG/DL
ALP SERPL-CCNC: 87 U/L (ref 40–150)
ALT SERPL W P-5'-P-CCNC: 39 U/L (ref 0–70)
ANION GAP SERPL CALCULATED.3IONS-SCNC: 8 MMOL/L (ref 3–14)
APPEARANCE UR: CLEAR
AST SERPL W P-5'-P-CCNC: 24 U/L (ref 0–45)
BILIRUB SERPL-MCNC: 0.4 MG/DL (ref 0.2–1.3)
BILIRUB UR QL STRIP: NEGATIVE
BUN SERPL-MCNC: 16 MG/DL (ref 7–30)
CALCIUM SERPL-MCNC: 8.8 MG/DL (ref 8.5–10.1)
CHLORIDE SERPL-SCNC: 106 MMOL/L (ref 94–109)
CO2 SERPL-SCNC: 25 MMOL/L (ref 20–32)
COLOR UR AUTO: YELLOW
CREAT SERPL-MCNC: 0.85 MG/DL (ref 0.66–1.25)
ERYTHROCYTE [DISTWIDTH] IN BLOOD BY AUTOMATED COUNT: 12.3 % (ref 10–15)
GFR SERPL CREATININE-BSD FRML MDRD: >90 ML/MIN/1.7M2
GLUCOSE SERPL-MCNC: 94 MG/DL (ref 70–99)
GLUCOSE UR STRIP-MCNC: NEGATIVE MG/DL
HCT VFR BLD AUTO: 40.8 % (ref 40–53)
HGB BLD-MCNC: 14.5 G/DL (ref 13.3–17.7)
HGB UR QL STRIP: NEGATIVE
KETONES UR STRIP-MCNC: NEGATIVE MG/DL
LEUKOCYTE ESTERASE UR QL STRIP: NEGATIVE
MCH RBC QN AUTO: 30.1 PG (ref 26.5–33)
MCHC RBC AUTO-ENTMCNC: 35.5 G/DL (ref 31.5–36.5)
MCV RBC AUTO: 85 FL (ref 78–100)
NITRATE UR QL: NEGATIVE
PH UR STRIP: 5.5 PH (ref 5–7)
PLATELET # BLD AUTO: 162 10E9/L (ref 150–450)
POTASSIUM SERPL-SCNC: 4.2 MMOL/L (ref 3.4–5.3)
PROT SERPL-MCNC: 7 G/DL (ref 6.8–8.8)
RBC # BLD AUTO: 4.81 10E12/L (ref 4.4–5.9)
SODIUM SERPL-SCNC: 139 MMOL/L (ref 133–144)
SOURCE: NORMAL
SP GR UR STRIP: 1.02 (ref 1–1.03)
UROBILINOGEN UR STRIP-ACNC: 0.2 EU/DL (ref 0.2–1)
WBC # BLD AUTO: 5.6 10E9/L (ref 4–11)

## 2017-09-14 PROCEDURE — 36415 COLL VENOUS BLD VENIPUNCTURE: CPT | Performed by: FAMILY MEDICINE

## 2017-09-14 PROCEDURE — 85027 COMPLETE CBC AUTOMATED: CPT | Performed by: FAMILY MEDICINE

## 2017-09-14 PROCEDURE — 81003 URINALYSIS AUTO W/O SCOPE: CPT | Performed by: FAMILY MEDICINE

## 2017-09-14 PROCEDURE — 80053 COMPREHEN METABOLIC PANEL: CPT | Performed by: FAMILY MEDICINE

## 2017-09-14 PROCEDURE — 99214 OFFICE O/P EST MOD 30 MIN: CPT | Performed by: FAMILY MEDICINE

## 2017-09-14 PROCEDURE — 93000 ELECTROCARDIOGRAM COMPLETE: CPT | Performed by: FAMILY MEDICINE

## 2017-09-14 NOTE — MR AVS SNAPSHOT
After Visit Summary   9/14/2017    Gonzalez Beard    MRN: [id number]           Patient Information     Date Of Birth          1960        Visit Information        Provider Department      9/14/2017 10:20 AM Bonilla Guzmán MD [facility]        Today's Diagnoses     Preoperative examination    -  1    Diverticulosis of large intestine without hemorrhage        Gastroesophageal reflux disease without esophagitis        Hyperlipidemia LDL goal <130        Medication monitoring encounter          Care Instructions    STOP fish oil, aspirin, and multivitamin 1 week before surgery.    Protonix and probiotic are OK up to day of surgery.    Heat, ice, and stretching as needed for shoulder pain.    Tylenol is OK until surgery.     Avoid Advil, aleve, or ibuprofen.     [facility]                        To reach your care team during and after hours:   [phone]  To reach our pharmacy:        [phone]    Clinic Hours                        Our clinic hours are:    Monday   7:30 am to 7:00 pm                  Tuesday through Friday 7:30 am to 5:00 pm                             Saturday   8:00 am to 12:00 pm      Sunday   Closed      Pharmacy Hours                        Our pharmacy hours are:    Monday   8:30 am to 7:00 pm       Tuesday to Friday  8:30 am to 6:00 pm                       Saturday    9:00 am to 1:00 pm              Sunday    Closed              There is also information available at our web site:  [email or web address]    If your provider ordered any lab tests and you do not receive the results within 10 business days, please call the clinic.    If you need a medication refill please contact your pharmacy.  Please allow 2-3 business days for your refill to be completed.    Our clinic offers telephone visits and e visits.  Please ask one of your team members to explain more.      Use [employer] (secure email communication and access to your chart) to send your  primary care provider a message or make an appointment. Ask someone on your Team how to sign up for [facility].  Immunizations                      Immunization History   Administered Date(s) Administered     Influenza Vaccine IM 3yrs+ 4 Valent IIV4 12/08/2015     TD (ADULT, 7+) 06/28/2017     TDAP Vaccine (Adacel) 05/01/2007        Health Maintenance                         Health Maintenance Due   Topic Date Due     Flu Vaccine - yearly  09/01/2017       Before Your Surgery      Call your surgeon if there is any change in your health. This includes signs of a cold or flu (such as a sore throat, runny nose, cough, rash or fever).    Do not smoke, drink alcohol or take over the counter medicine (unless your surgeon or primary care doctor tells you to) for the 24 hours before and after surgery.    If you take prescribed drugs: Follow your doctor s orders about which medicines to take and which to stop until after surgery.    Eating and drinking prior to surgery: follow the instructions from your surgeon    Take a shower or bath the night before surgery. Use the soap your surgeon gave you to gently clean your skin. If you do not have soap from your surgeon, use your regular soap. Do not shave or scrub the surgery site.  Wear clean pajamas and have clean sheets on your bed.                                      Rotator Cuff Injury   What is a rotator cuff injury?   A rotator cuff injury is a strain or tear in the group of tendons and muscles that hold your shoulder joint together and help move your shoulder.   How does it occur?   A rotator cuff injury may result from:     using your arm to break a fall     falling onto your arm     lifting a heavy object     use of your shoulder in sports with a repetitive overhead movement, such as swimming, baseball (mainly pitchers), football, and tennis, which gradually strains the tendon     manual labor such as painting, plastering, raking leaves, or housework   What are the  symptoms?   The symptoms of a torn rotator cuff are:     arm and shoulder pain     shoulder weakness     shoulder tenderness     loss of shoulder movement, especially overhead   How is it diagnosed?   Your healthcare provider will examine you and check your shoulder for pain, tenderness, and loss of motion as you move your arm in all directions. Your provider will ask if your shoulder pain began suddenly or gradually. You may have an X-ray to make sure there are not any fractures or bone spurs.   Based on these results, you may have other tests or procedures right away or later, such as:     magnetic resonance imaging (MRI), which creates images of your shoulder and surrounding structures with sound waves     an arthrogram, which is an X-ray or MRI that is taken after a special dye has been injected into your shoulder joint to outline its soft structures     arthroscopy, a surgical procedure in which a small instrument is inserted into your shoulder joint so your provider can look directly at your rotator cuff   What is the treatment?   A tendon in your shoulder can be inflamed, partially torn, or completely torn. What is done about it depends on how torn it is and how much it hurts.   If your tear is a minor one, it can be left to heal by itself if it does not interfere with your everyday activities. Your treatment plan should include:     proper sitting posture, in which your head and shoulders are balanced     rest for your shoulder, which means avoiding strenuous activity or any overhead motion that causes pain     ice packs at least once a day, and preferably 2 or 3 times a day     doing the exercises your healthcare provider gives you     anti-inflammatory drugs. Adults aged 65 years and older should not take non-steroidal anti-inflammatory medicine for more than 7 days without their healthcare provider's approval.     physical therapy to strengthen your shoulder as it heals   If you have a bad tear, you may  need to have it repaired by arthroscopy. Arthroscopy can be used to perform surgery on a joint as well as to see inside the joint. The rough edges of a torn tendon can be trimmed and left to heal. Larger tears can be stitched back together. After surgery, your treatment plan will include physical therapy to strengthen your shoulder as it heals.   How long will the effects last?   Full recovery depends on what is torn and how it is treated.   When can I return to my normal activities?   Everyone recovers from an injury at a different rate. Return to your activities will be determined by how soon your shoulder recovers, not by how many days or weeks it has been since your injury has occurred. In general, the longer you have symptoms before you start treatment, the longer it will take to get better. The goal of rehabilitation is to return you to your normal activities as soon as is safely possible. If you return too soon you may worsen your injury.   You may safely return to your normal activities when:     Your injured shoulder has full range of motion without pain.     Your injured shoulder has regained normal strength compared to the uninjured shoulder.   What can be done to help prevent this from recurring?   The best way to prevent a recurrence is to strengthen your shoulder muscles and keep them in peak condition with shoulder exercises.           Rotator Cuff Strain Rehabilitation Exercises                  You may do all of these exercises right away.   Isometric shoulder external rotation:  a doorway with your elbow bent 90 degrees and the back of the wrist on your injured side pressed against the door frame. Try to press your hand outward into the door frame. Hold for 5 seconds. Do 3 sets of 10.   Isometric shoulder internal rotation:  a doorway with your elbow bent 90 degrees and the front of the wrist on your injured side pressed against the door frame. Try to press your palm into the door  frame. Hold for 5 seconds. Do 3 sets of 10.   Wand exercise: Flexion: Stand upright and hold a stick in both hands, palms down. Stretch your arms by lifting them over your head, keeping your arms straight. Hold for 5 seconds and return to the starting position. Repeat 10 times.   Wand exercise: Extension: Stand upright and hold a stick in both hands behind your back. Move the stick away from your back. Hold the end position for 5 seconds. Relax and return to the starting position. Repeat 10 times.   Wand exercise: External rotation: Lie on your back and hold a stick in both hands, palms up. Your upper arms should be resting on the floor with your elbows at your sides and bent 90 degrees. Use your uninjured arm to push your injured arm out away from your body. Keep the elbow of your injured arm at your side while it is being pushed. Hold the stretch for 5 seconds. Repeat 10 times.   Wand exercise: Shoulder abduction and adduction: Stand and hold a stick with both hands, palms facing away from your body. Rest the stick against the front of your thighs. Use your uninjured arm to push your injured arm out to the side and up as high as possible. Keep your arms straight. Hold for 5 seconds. Repeat 10 times.   Resisted shoulder external rotation: Stand sideways next to a door with your injured arm farther from the door. Tie a knot in the end of the tubing and shut the knot in the door at waist level. Hold the other end of the tubing with the hand of your injured arm. Rest the hand of your injured arm across your stomach. Keeping your elbow in at your side, rotate your arm outward and away from your waist. Make sure you keep your elbow bent 90 degrees and your forearm parallel to the floor. Repeat 10 times. Build up to 3 sets of 10.   Resisted shoulder internal rotation: Stand sideways next to a door with your injured arm closest to the door. Tie a knot in the end of the tubing and shut the knot in the door at waist level.  "Hold the other end of the tubing with the hand of your injured arm. Bend the elbow of your injured arm 90 degrees. Keeping your elbow in at your side, rotate your forearm across your body and then back to the starting position. Make sure you keep your forearm parallel to the floor. Do 3 sets of 10.   Scaption: Stand with your arms at your sides and with your elbows straight. Slowly raise your arms to eye level. As you raise your arms, spread them apart so that they are only slightly in front of your body (at about a 30-degree angle to the front of your body). Point your thumbs toward the ceiling. Hold for 2 seconds and lower your arms slowly. Do 3 sets of 10. Progress to holding a soup can or light weight when you are doing the exercise and increase the weight as the exercise gets easier.   Side-lying external rotation: Lie on your uninjured side with your injured arm at your side and your elbow bent 90 degrees. Keeping your elbow against your side, raise your forearm toward the ceiling and hold for 2 seconds. Slowly lower your arm. Do 3 sets of 10. You can start doing this exercise holding a soup can or light weight and gradually increase the weight as long as there is no pain.   Horizontal abduction: Lie on your stomach on a table or the edge of a bed with the arm on your injured side hanging down over the edge. Raise your arm out to the side, with your thumb pointed toward the ceiling, until your arm is parallel to the floor. Hold for 2 seconds and then lower it slowly. Start this exercise with no weight. As you get stronger, add a light weight or hold a soup can. Do 3 sets of 10.   Push-up with a plus: Begin on the floor on your hands and knees. Keep your arms a shoulder width apart and lift your feet off the floor. Arch your back as high as possible and round your shoulders (this is the \"plus\" part or the exercise). Bend your elbows and lower your body to the floor. Return to the starting position and arch your " back again. Do 3 sets of 10.   Published by [employer].  This content is reviewed periodically and is subject to change as new health information becomes available. The information is intended to inform and educate and is not a replacement for medical evaluation, advice, diagnosis or treatment by a healthcare professional.   Written by Tawana Stafford, MS, PT, and Nicole Roche, PT, [facility], [facility], for [employer].   ? 2010 [facility] and/or its affiliates. All Rights Reserved.   Copyright   [employer] 2011  Adult Health Advisor                        Follow-ups after your visit        Who to contact     If you have questions or need follow up information about today's clinic visit or your schedule please contact [facility] directly at [phone].  Normal or non-critical lab and imaging results will be communicated to you by [employer], letter or phone within 4 business days after the clinic has received the results. If you do not hear from us within 7 days, please contact the clinic through [employer] or phone. If you have a critical or abnormal lab result, we will notify you by phone as soon as possible.  Submit refill requests through [employer] or call your pharmacy and they will forward the refill request to us. Please allow 3 business days for your refill to be completed.          Additional Information About Your Visit        POKKT Information     [employer] gives you secure access to your electronic health record. If you see a primary care provider, you can also send messages to your care team and make appointments. If you have questions, please call your primary care clinic.  If you do not have a primary care provider, please call [phone] and they will assist you.        Care EveryWhere ID     This is your Care EveryWhere ID. This could be used by other organizations to access your [place] medical records  [id number]        Your Vitals Were     Pulse Temperature Height Pulse  "Oximetry BMI (Body Mass Index)       90 98.1  F (36.7  C) (Oral) 6' 4\" (1.93 m) 94% 28 kg/m2        Blood Pressure from Last 3 Encounters:   09/14/17 122/70   08/18/17 125/76   07/18/17 115/73    Weight from Last 3 Encounters:   09/14/17 230 lb (104.3 kg)   07/18/17 235 lb (106.6 kg)   06/28/17 235 lb (106.6 kg)              We Performed the Following     *UA reflex to Microscopic and Culture ([place] [facility] (except [place] and [place])     CBC with platelets     Comprehensive metabolic panel     EKG 12-lead complete w/read - Clinics        Primary Care Provider Office Phone # Fax #    Bonilla Guzmán [phone] [address] [facility] [address]        Equal Access to Services     DREW WHITE : Hadii pallavi roa hadasho Soomaali, waaxda luqadaha, qaybta kaalmada adeegyada, chelita espinozain hayyaneth carbone . So [facility] [phone].    ATENCIÓN: Si habla español, tiene a perales disposición servicios gratuitos de asistencia lingüística. Llame al [phone].    We comply with applicable federal civil rights laws and [place] laws. We do not discriminate on the basis of race, color, national origin, age, disability sex, sexual orientation or gender identity.            Thank you!     Thank you for choosing [facility]  for your care. Our goal is always to provide you with excellent care. Hearing back from our patients is one way we can continue to improve our services. Please take a few minutes to complete the written survey that you may receive in the mail after your visit with us. Thank you!             Your Updated Medication List - Protect others around you: Learn how to safely use, store and throw away your medicines at www.disposemymeds.org.          This list is accurate as of: 9/14/17 11:12 AM.  Always use your most recent med list.                   Brand Name Dispense Instructions for use Diagnosis    aspirin 81 MG tablet      Take  by mouth daily. "        fish oil-omega-3 fatty acids 1000 MG capsule     90 capsule    Take 1 capsule by mouth daily.        MULTIVITAMINS PO      Take 1 tablet by mouth daily        pantoprazole 40 MG EC tablet    PROTONIX    90 tablet    Take 1 tablet (40 mg) by mouth daily    Gastroesophageal reflux disease without esophagitis       PROBIOTIC DAILY PO      Take 1 capsule by mouth daily

## 2017-09-14 NOTE — PATIENT INSTRUCTIONS
STOP fish oil, aspirin, and multivitamin 1 week before surgery.    Protonix and probiotic are OK up to day of surgery.    Heat, ice, and stretching as needed for shoulder pain.    Tylenol is OK until surgery.     Avoid Advil, aleve, or ibuprofen.     [facility]                        To reach your care team during and after hours:   [phone]  To reach our pharmacy:        [phone]    Clinic Hours                        Our clinic hours are:    Monday   7:30 am to 7:00 pm                  Tuesday through Friday 7:30 am to 5:00 pm                             Saturday   8:00 am to 12:00 pm      Sunday   Closed      Pharmacy Hours                        Our pharmacy hours are:    Monday   8:30 am to 7:00 pm       Tuesday to Friday  8:30 am to 6:00 pm                       Saturday    9:00 am to 1:00 pm              Sunday    Closed              There is also information available at our web site:  [email or web address]    If your provider ordered any lab tests and you do not receive the results within 10 business days, please call the clinic.    If you need a medication refill please contact your pharmacy.  Please allow 2-3 business days for your refill to be completed.    Our clinic offers telephone visits and e visits.  Please ask one of your team members to explain more.      Use MyChart (secure email communication and access to your chart) to send your primary care provider a message or make an appointment. Ask someone on your Team how to sign up for [employer].  Immunizations                      Immunization History   Administered Date(s) Administered     Influenza Vaccine IM 3yrs+ 4 Valent IIV4 12/08/2015     TD (ADULT, 7+) 06/28/2017     TDAP Vaccine (Adacel) 05/01/2007        Health Maintenance                         Health Maintenance Due   Topic Date Due     Flu Vaccine - yearly  09/01/2017       Before Your Surgery      Call your surgeon if there is any change in your health. This  includes signs of a cold or flu (such as a sore throat, runny nose, cough, rash or fever).    Do not smoke, drink alcohol or take over the counter medicine (unless your surgeon or primary care doctor tells you to) for the 24 hours before and after surgery.    If you take prescribed drugs: Follow your doctor s orders about which medicines to take and which to stop until after surgery.    Eating and drinking prior to surgery: follow the instructions from your surgeon    Take a shower or bath the night before surgery. Use the soap your surgeon gave you to gently clean your skin. If you do not have soap from your surgeon, use your regular soap. Do not shave or scrub the surgery site.  Wear clean pajamas and have clean sheets on your bed.                                      Rotator Cuff Injury   What is a rotator cuff injury?   A rotator cuff injury is a strain or tear in the group of tendons and muscles that hold your shoulder joint together and help move your shoulder.   How does it occur?   A rotator cuff injury may result from:     using your arm to break a fall     falling onto your arm     lifting a heavy object     use of your shoulder in sports with a repetitive overhead movement, such as swimming, baseball (mainly pitchers), football, and tennis, which gradually strains the tendon     manual labor such as painting, plastering, raking leaves, or housework   What are the symptoms?   The symptoms of a torn rotator cuff are:     arm and shoulder pain     shoulder weakness     shoulder tenderness     loss of shoulder movement, especially overhead   How is it diagnosed?   Your healthcare provider will examine you and check your shoulder for pain, tenderness, and loss of motion as you move your arm in all directions. Your provider will ask if your shoulder pain began suddenly or gradually. You may have an X-ray to make sure there are not any fractures or bone spurs.   Based on these results, you may have other tests  or procedures right away or later, such as:     magnetic resonance imaging (MRI), which creates images of your shoulder and surrounding structures with sound waves     an arthrogram, which is an X-ray or MRI that is taken after a special dye has been injected into your shoulder joint to outline its soft structures     arthroscopy, a surgical procedure in which a small instrument is inserted into your shoulder joint so your provider can look directly at your rotator cuff   What is the treatment?   A tendon in your shoulder can be inflamed, partially torn, or completely torn. What is done about it depends on how torn it is and how much it hurts.   If your tear is a minor one, it can be left to heal by itself if it does not interfere with your everyday activities. Your treatment plan should include:     proper sitting posture, in which your head and shoulders are balanced     rest for your shoulder, which means avoiding strenuous activity or any overhead motion that causes pain     ice packs at least once a day, and preferably 2 or 3 times a day     doing the exercises your healthcare provider gives you     anti-inflammatory drugs. Adults aged 65 years and older should not take non-steroidal anti-inflammatory medicine for more than 7 days without their healthcare provider's approval.     physical therapy to strengthen your shoulder as it heals   If you have a bad tear, you may need to have it repaired by arthroscopy. Arthroscopy can be used to perform surgery on a joint as well as to see inside the joint. The rough edges of a torn tendon can be trimmed and left to heal. Larger tears can be stitched back together. After surgery, your treatment plan will include physical therapy to strengthen your shoulder as it heals.   How long will the effects last?   Full recovery depends on what is torn and how it is treated.   When can I return to my normal activities?   Everyone recovers from an injury at a different rate. Return  to your activities will be determined by how soon your shoulder recovers, not by how many days or weeks it has been since your injury has occurred. In general, the longer you have symptoms before you start treatment, the longer it will take to get better. The goal of rehabilitation is to return you to your normal activities as soon as is safely possible. If you return too soon you may worsen your injury.   You may safely return to your normal activities when:     Your injured shoulder has full range of motion without pain.     Your injured shoulder has regained normal strength compared to the uninjured shoulder.   What can be done to help prevent this from recurring?   The best way to prevent a recurrence is to strengthen your shoulder muscles and keep them in peak condition with shoulder exercises.           Rotator Cuff Strain Rehabilitation Exercises                  You may do all of these exercises right away.   Isometric shoulder external rotation:  a doorway with your elbow bent 90 degrees and the back of the wrist on your injured side pressed against the door frame. Try to press your hand outward into the door frame. Hold for 5 seconds. Do 3 sets of 10.   Isometric shoulder internal rotation:  a doorway with your elbow bent 90 degrees and the front of the wrist on your injured side pressed against the door frame. Try to press your palm into the door frame. Hold for 5 seconds. Do 3 sets of 10.   Wand exercise: Flexion: Stand upright and hold a stick in both hands, palms down. Stretch your arms by lifting them over your head, keeping your arms straight. Hold for 5 seconds and return to the starting position. Repeat 10 times.   Wand exercise: Extension: Stand upright and hold a stick in both hands behind your back. Move the stick away from your back. Hold the end position for 5 seconds. Relax and return to the starting position. Repeat 10 times.   Wand exercise: External rotation: Lie on your  back and hold a stick in both hands, palms up. Your upper arms should be resting on the floor with your elbows at your sides and bent 90 degrees. Use your uninjured arm to push your injured arm out away from your body. Keep the elbow of your injured arm at your side while it is being pushed. Hold the stretch for 5 seconds. Repeat 10 times.   Wand exercise: Shoulder abduction and adduction: Stand and hold a stick with both hands, palms facing away from your body. Rest the stick against the front of your thighs. Use your uninjured arm to push your injured arm out to the side and up as high as possible. Keep your arms straight. Hold for 5 seconds. Repeat 10 times.   Resisted shoulder external rotation: Stand sideways next to a door with your injured arm farther from the door. Tie a knot in the end of the tubing and shut the knot in the door at waist level. Hold the other end of the tubing with the hand of your injured arm. Rest the hand of your injured arm across your stomach. Keeping your elbow in at your side, rotate your arm outward and away from your waist. Make sure you keep your elbow bent 90 degrees and your forearm parallel to the floor. Repeat 10 times. Build up to 3 sets of 10.   Resisted shoulder internal rotation: Stand sideways next to a door with your injured arm closest to the door. Tie a knot in the end of the tubing and shut the knot in the door at waist level. Hold the other end of the tubing with the hand of your injured arm. Bend the elbow of your injured arm 90 degrees. Keeping your elbow in at your side, rotate your forearm across your body and then back to the starting position. Make sure you keep your forearm parallel to the floor. Do 3 sets of 10.   Scaption: Stand with your arms at your sides and with your elbows straight. Slowly raise your arms to eye level. As you raise your arms, spread them apart so that they are only slightly in front of your body (at about a 30-degree angle to the front  "of your body). Point your thumbs toward the ceiling. Hold for 2 seconds and lower your arms slowly. Do 3 sets of 10. Progress to holding a soup can or light weight when you are doing the exercise and increase the weight as the exercise gets easier.   Side-lying external rotation: Lie on your uninjured side with your injured arm at your side and your elbow bent 90 degrees. Keeping your elbow against your side, raise your forearm toward the ceiling and hold for 2 seconds. Slowly lower your arm. Do 3 sets of 10. You can start doing this exercise holding a soup can or light weight and gradually increase the weight as long as there is no pain.   Horizontal abduction: Lie on your stomach on a table or the edge of a bed with the arm on your injured side hanging down over the edge. Raise your arm out to the side, with your thumb pointed toward the ceiling, until your arm is parallel to the floor. Hold for 2 seconds and then lower it slowly. Start this exercise with no weight. As you get stronger, add a light weight or hold a soup can. Do 3 sets of 10.   Push-up with a plus: Begin on the floor on your hands and knees. Keep your arms a shoulder width apart and lift your feet off the floor. Arch your back as high as possible and round your shoulders (this is the \"plus\" part or the exercise). Bend your elbows and lower your body to the floor. Return to the starting position and arch your back again. Do 3 sets of 10.   Published by [employer].  This content is reviewed periodically and is subject to change as new health information becomes available. The information is intended to inform and educate and is not a replacement for medical evaluation, advice, diagnosis or treatment by a healthcare professional.   Written by Tawana Stafford, MS, PT, and Nicole Roche, PT, [facility], [facility], for [employer].   ? 2010 [facility] and/or its affiliates. All Rights Reserved.   Copyright   [employer] 2011  Adult Health Advisor       "

## 2017-09-14 NOTE — NURSING NOTE
"Chief Complaint   Patient presents with     Pre-Op Exam       Initial /70  Pulse 90  Temp 98.1  F (36.7  C) (Oral)  Ht 6' 4\" (1.93 m)  Wt 230 lb (104.3 kg)  SpO2 94%  BMI 28 kg/m2 Estimated body mass index is 28 kg/(m^2) as calculated from the following:    Height as of this encounter: 6' 4\" (1.93 m).    Weight as of this encounter: 230 lb (104.3 kg).  Medication Reconciliation: complete  "

## 2017-09-14 NOTE — PROGRESS NOTES
[address] [address]  [phone]  Dept: [phone]    PRE-OP EVALUATION:  Today's date: 2017    Gonzalez Beard (: 1960) presents for pre-operative evaluation assessment as requested by Dr. Hayward.  He requires evaluation and anesthesia risk assessment prior to undergoing surgery/procedure for treatment of diverticulosis  .  Proposed procedure: sigmoid colectomy    Date of Surgery/ Procedure: 17  Time of Surgery/ Procedure: 1 pm  Hospital/Surgical Facility: [facility]  Fax number for surgical facility: ?  Primary Physician: Bonilla Guzmán  Type of Anesthesia Anticipated: General    Patient has a Health Care Directive or Living Will:  YES     1. NO - Do you have a history of heart attack, stroke, stent, bypass or surgery on an artery in the head, neck, heart or legs?  2. NO - Do you ever have any pain or discomfort in your chest?  3. NO - Do you have a history of  Heart Failure?  4. NO - Are you troubled by shortness of breath when: walking on the level, up a slight hill or at night?  5. NO - Do you currently have a cold, bronchitis or other respiratory infection?  6. NO - Do you have a cough, shortness of breath or wheezing?  7. NO - Do you sometimes get pains in the calves of your legs when you walk?  8. NO - Do you or anyone in your family have previous history of blood clots?  9. NO - Do you or does anyone in your family have a serious bleeding problem such as prolonged bleeding following surgeries or cuts?  10. NO - Have you ever had problems with anemia or been told to take iron pills?  11. NO - Have you had any abnormal blood loss such as black, tarry or bloody stools, or abnormal vaginal bleeding?  12. NO - Have you ever had a blood transfusion?  13. NO - Have you or any of your relatives ever had problems with anesthesia?  14. NO - Do you have sleep apnea, excessive snoring or daytime drowsiness?  15. NO - Do you have  any prosthetic heart valves?  16. NO - Do you have prosthetic joints?  17. NO - Is there any chance that you may be pregnant?    HPI:                                                      Brief HPI related to upcoming procedure:     Gonzalez reports for a pre-op exam for a sigmoid colectomy with Dr. Hayward on 9/22/17 at [facility]. Gonzalez will stay at [facility] for 5 days.     Gonzalez reports that he had a positive FIT screen to which he was advised to have a colonoscopy. He notes that colonoscopy on 8/15/17 looked good, no concerns other than diverticulitis noted in sigmoid. Gonzalez reports abdominal pain 1 day after the colonoscopy to which he reported to [facility] ED on 8/18/17 for diverticulitis. Dr. Hayward advised a sigmoid colectomy to treat the diverticulitis.     Diverticulitis -- originally diagnosed 10 years ago. 3 hospital visits since then and multiple clinic treatments with 6 colonoscopies.     HYPERLIPIDEMIA - Patient has a long history of significant Hyperlipidemia requiring medication for treatment with recent poor control. Patient is not currently taking a statin.     Recent Labs   Lab Test  06/28/17   1402  12/15/15   0832  03/13/13   1037  08/17/11   0904   CHOL  237*  218*  205*  233*   HDL  44  37*  30*  33*   LDL  146*  146*  128  133*   TRIG  233*  177*  236*  333*   CHOLHDLRATIO   --    --   6.9*  7.0*     Esophageal reflux -- controlled with pantoprazole.     Gonzalez reports left shoulder pain secondary to playing football about 10 days ago. He notes stiffness and pain the next day. Denies redness, warmth, swelling, and LOC.     MEDICAL HISTORY:                                                    Patient Active Problem List    Diagnosis Date Noted     Advanced directives, counseling/discussion 06/28/2017     Priority: Medium     Advance Care Planning 6/28/2017: ACP Review of Chart / Resources Provided:  Reviewed chart for advance care plan.  Gonzalez Beard has an advance care plan which  needs to be updated. Patient states presence of new/updated ACP document. Copy requested  Added by Cyndy Cornejo      Priority: Medium     rt face/ear       Diverticulitis 04/20/2014     Priority: Medium     HYPERLIPIDEMIA LDL GOAL <130 10/31/2010     Priority: Medium     Insomnia      Priority: Medium     Problem list name updated by automated process. Provider to review       Esophageal reflux 07/21/2005     Priority: Medium      Past Medical History:   Diagnosis Date     Benign neoplasm of colon 05/2008    multiple with diverticulosis - adenomatous, hyperplastic     Diverticulitis of colon 9/07, 1/17     Esophageal reflux 2005     Hyperlipidemia LDL goal <130 1997     Hyperplastic colon polyp 7/09    due 5 yrs     Inguinal hernia with obstruction, without mention of gangrene, unilateral or unspecified, (not specified as recurrent) 9/19/07    right detected on exam      Insomnia, unspecified      Shingles 4/14    rt face/ear     Stricture and stenosis of esophagus      Past Surgical History:   Procedure Laterality Date     ESOPHAGOSCOPY, GASTROSCOPY, DUODENOSCOPY (EGD), COMBINED N/A 1/8/2016    stricture with dilation     HC COLONOSCOPY THRU STOMA W BIOPSY/CAUTERY TUMOR/POLYP/LESION  5/08, 7/09, 7/11, 8/17    multiple polyps with diverticulosis - due 5 yr     HC ESOPHAGOSCOPY, DIAGNOSTIC  5/07, 2/09    Dr Elena - reactive gastropathy - with strictures dilated x 2  = 5/07, 6/15/07      stress echo  9/11    normal     UPPER GI ENDOSCOPY  6/15, 1/16    stricture, food caught, [place] - 1/16 normal [facility]     Current Outpatient Prescriptions   Medication Sig Dispense Refill     pantoprazole (PROTONIX) 40 MG EC tablet Take 1 tablet (40 mg) by mouth daily 90 tablet 3     Probiotic Product (PROBIOTIC DAILY PO) Take 1 capsule by mouth daily       fish oil-omega-3 fatty acids (OMEGA 3) 1000 MG capsule Take 1 capsule by mouth daily. 90 capsule 3     aspirin 81 MG tablet Take  by mouth daily.  3      "Multiple Vitamin (MULTIVITAMINS PO) Take 1 tablet by mouth daily        OTC products: herbals and vitamins - multivitamin, fish oil, and probiotic, and Aspirin    No Known Allergies   Latex Allergy: NO    Social History   Substance Use Topics     Smoking status: Former Smoker     Packs/day: 0.75     Years: 25.00     Types: Cigarettes     Smokeless tobacco: Current User     Last attempt to quit: 4/30/2010      Comment: quit 2010, 2 tins per week     Alcohol use 3.0 - 3.6 oz/week     5 - 6 Standard drinks or equivalent per week     History   Drug Use     Yes     Comment: occ marijuana       REVIEW OF SYSTEMS:                                                    Constitutional, neuro, ENT, endocrine, pulmonary, cardiac, gastrointestinal, genitourinary, musculoskeletal, integument and psychiatric systems are negative, except as otherwise noted.    EXAM:                                                    /70  Pulse 90  Temp 98.1  F (36.7  C) (Oral)  Ht 6' 4\" (1.93 m)  Wt 230 lb (104.3 kg)  SpO2 94%  BMI 28 kg/m2    GENERAL APPEARANCE: healthy, alert and no distress     EYES: EOMI,  PERRL     HENT: ear canals and TM's normal upon viewing with otoscope, nose and mouth without ulcers or lesions upon viewing with otoscope     NECK: no adenopathy, no asymmetry, masses, or scars and thyroid normal to palpation     RESP: lungs clear to auscultation - no rales, rhonchi or wheezes     CV: regular rates and rhythm, normal S1 S2, no S3 or S4 and no murmur, click or rub     ABDOMEN:  soft, nontender, no HSM or masses and bowel sounds normal     MS: extremities normal- no gross deformities noted, no evidence of inflammation in joints, FROM in all extremities.     SKIN: no suspicious lesions or rashes     NEURO: mentation intact and speech normal     PSYCH: mentation appears normal. and affect normal/bright     LYMPHATICS: No axillary, cervical, or supraclavicular nodes    DIAGNOSTICS:                                         "            EKG: appears normal, NSR, normal axis, normal intervals, no acute ST/T changes c/w ischemia, no LVH by voltage criteria, unchanged from previous tracings    Recent Labs   Lab Test  08/18/17   1018  06/28/17   1402   HGB  15.8  15.0   PLT  162  165   NA  136  139   POTASSIUM  3.9  4.7   CR  0.96  0.92     IMPRESSION:                                                    Reason for surgery/procedure: diverticulosis treatment.    The proposed surgical procedure is considered LOW risk.    REVISED CARDIAC RISK INDEX  The patient has the following serious cardiovascular risks for perioperative complications such as (MI, PE, VFib and 3  AV Block):  No serious cardiac risks  INTERPRETATION: 0 risks: Class I (very low risk - 0.4% complication rate)    The patient has the following additional risks for perioperative complications:  No identified additional risks      ICD-10-CM    1. Preoperative examination Z01.818 EKG 12-lead complete w/read - Clinics     Comprehensive metabolic panel     CBC with platelets     *UA reflex to Microscopic and Culture ([facility] (except [place] and [place])   2. Diverticulosis of large intestine without hemorrhage K57.30 CBC with platelets   3. Gastroesophageal reflux disease without esophagitis K21.9 CBC with platelets   4. Hyperlipidemia LDL goal <130 E78.5 Comprehensive metabolic panel   5. Medication monitoring encounter Z51.81 EKG 12-lead complete w/read - Clinics     Comprehensive metabolic panel     CBC with platelets     *UA reflex to Microscopic and Culture ([facility] (except [place] and [place])       RECOMMENDATIONS:                                                      --Patient is to take all scheduled medications on the day of surgery EXCEPT for modifications listed below.  Stop fish oil, aspirin, and multivitamin 1 week before surgery.  Avoid Advil, Aleve, and ibuprofen.     APPROVAL GIVEN to proceed with proposed procedure,  without further diagnostic evaluation     This document serves as a record of the services and decisions personally performed and made by Bonilla Guzmán MD [facility]. It was created on their behalf by Mckenna Nicole, a trained medical scribe. The creation of this document is based the provider's statements to the medical scribe. Mckenna Nicole September 14, 2017 10:45 AM              Bonilla Guzmán MD, FAAFP    [address]  [address]    [phone] Fax      Copy of this evaluation report is provided to requesting physician.    [facility] Preop Guidelines

## 2017-09-14 NOTE — LETTER
[facility]  [address] [place] [address]                  [phone]   September 15, 2017    Gonzalez Beard  [address] [facility] [address]      Dear Gonzalez,    Here is a summary of your recent test results:    Labs are overall excellent.     We advise:     Please proceed with surgery.     For additional lab test information, [email or web address] is an excellent reference.     Let us know if you have any questions or concerns.     Your test results are enclosed.      Please contact me if you have any questions.    In addition, here is a list of due or overdue Health Maintenance reminders.    Health Maintenance Due   Topic Date Due     Flu Vaccine - yearly  09/01/2017       Please call us at [phone] (or use Postachio) to address the above recommendations.            Thank you very much for trusting [facility]..     Healthy regards,        Bonilla Guzmán M.D.        Results for orders placed or performed in visit on 09/14/17   Comprehensive metabolic panel   Result Value Ref Range    Sodium 139 133 - 144 mmol/L    Potassium 4.2 3.4 - 5.3 mmol/L    Chloride 106 94 - 109 mmol/L    Carbon Dioxide 25 20 - 32 mmol/L    Anion Gap 8 3 - 14 mmol/L    Glucose 94 70 - 99 mg/dL    Urea Nitrogen 16 7 - 30 mg/dL    Creatinine 0.85 0.66 - 1.25 mg/dL    GFR Estimate >90 >60 mL/min/1.7m2    GFR Estimate If Black >90 >60 mL/min/1.7m2    Calcium 8.8 8.5 - 10.1 mg/dL    Bilirubin Total 0.4 0.2 - 1.3 mg/dL    Albumin 3.8 3.4 - 5.0 g/dL    Protein Total 7.0 6.8 - 8.8 g/dL    Alkaline Phosphatase 87 40 - 150 U/L    ALT 39 0 - 70 U/L    AST 24 0 - 45 U/L   CBC with platelets   Result Value Ref Range    WBC 5.6 4.0 - 11.0 10e9/L    RBC Count 4.81 4.4 - 5.9 10e12/L    Hemoglobin 14.5 13.3 - 17.7 g/dL    Hematocrit 40.8 40.0 - 53.0 %    MCV 85 78 - 100 fl    MCH 30.1 26.5 - 33.0 pg    MCHC 35.5 31.5 - 36.5 g/dL    RDW 12.3 10.0 - 15.0 %    Platelet Count 162 150 -  450 10e9/L   *UA reflex to Microscopic and Culture ([facility] (except [place] and [place])   Result Value Ref Range    Color Urine Yellow     Appearance Urine Clear     Glucose Urine Negative NEG^Negative mg/dL    Bilirubin Urine Negative NEG^Negative    Ketones Urine Negative NEG^Negative mg/dL    Specific Gravity Urine 1.025 1.003 - 1.035    Blood Urine Negative NEG^Negative    pH Urine 5.5 5.0 - 7.0 pH    Protein Albumin Urine Negative NEG^Negative mg/dL    Urobilinogen Urine 0.2 0.2 - 1.0 EU/dL    Nitrite Urine Negative NEG^Negative    Leukocyte Esterase Urine Negative NEG^Negative    Source Midstream Urine

## 2017-09-22 ENCOUNTER — TRANSFERRED RECORDS (OUTPATIENT)
Dept: HEALTH INFORMATION MANAGEMENT | Facility: CLINIC | Age: 57
End: 2017-09-22

## 2017-09-27 ENCOUNTER — OFFICE VISIT (OUTPATIENT)
Dept: FAMILY MEDICINE | Facility: CLINIC | Age: 57
End: 2017-09-27
Payer: COMMERCIAL

## 2017-09-27 VITALS
HEIGHT: 76 IN | SYSTOLIC BLOOD PRESSURE: 134 MMHG | TEMPERATURE: 97.9 F | WEIGHT: 217 LBS | OXYGEN SATURATION: 98 % | BODY MASS INDEX: 26.42 KG/M2 | HEART RATE: 73 BPM | DIASTOLIC BLOOD PRESSURE: 80 MMHG

## 2017-09-27 DIAGNOSIS — Z90.49 S/P PARTIAL RESECTION OF COLON: ICD-10-CM

## 2017-09-27 DIAGNOSIS — Z51.81 MEDICATION MONITORING ENCOUNTER: ICD-10-CM

## 2017-09-27 DIAGNOSIS — Z98.890 POSTOPERATIVE STATE: Primary | ICD-10-CM

## 2017-09-27 DIAGNOSIS — R79.89 ELEVATED LFTS: ICD-10-CM

## 2017-09-27 DIAGNOSIS — K57.30 DIVERTICULOSIS OF LARGE INTESTINE WITHOUT HEMORRHAGE: ICD-10-CM

## 2017-09-27 LAB
ERYTHROCYTE [DISTWIDTH] IN BLOOD BY AUTOMATED COUNT: 12.5 % (ref 10–15)
HCT VFR BLD AUTO: 45.9 % (ref 40–53)
HGB BLD-MCNC: 16.4 G/DL (ref 13.3–17.7)
MCH RBC QN AUTO: 29.9 PG (ref 26.5–33)
MCHC RBC AUTO-ENTMCNC: 35.7 G/DL (ref 31.5–36.5)
MCV RBC AUTO: 84 FL (ref 78–100)
PLATELET # BLD AUTO: 219 10E9/L (ref 150–450)
RBC # BLD AUTO: 5.49 10E12/L (ref 4.4–5.9)
WBC # BLD AUTO: 8.5 10E9/L (ref 4–11)

## 2017-09-27 PROCEDURE — 36415 COLL VENOUS BLD VENIPUNCTURE: CPT | Performed by: FAMILY MEDICINE

## 2017-09-27 PROCEDURE — 99214 OFFICE O/P EST MOD 30 MIN: CPT | Performed by: FAMILY MEDICINE

## 2017-09-27 PROCEDURE — 80053 COMPREHEN METABOLIC PANEL: CPT | Performed by: FAMILY MEDICINE

## 2017-09-27 PROCEDURE — 85027 COMPLETE CBC AUTOMATED: CPT | Performed by: FAMILY MEDICINE

## 2017-09-27 RX ORDER — ACETAMINOPHEN 500 MG
1000 TABLET ORAL
COMMUNITY
Start: 2017-09-25 | End: 2018-05-14

## 2017-09-27 RX ORDER — OXYCODONE HYDROCHLORIDE 5 MG/1
TABLET ORAL
Refills: 0 | COMMUNITY
Start: 2017-09-25 | End: 2017-10-02

## 2017-09-27 NOTE — LETTER
[address] [address]  [phone]        January 31, 2018    Gonzalez Beard  [address] [facility] [address]              Dear Gonzalez Beard    This is to remind you that your fasting lab work is due.    You may call our office at [phone] to schedule an appointment.    Please disregard this notice if you have already had your labs drawn or made an appointment.        Sincerely,        Bonilla Guzmán M.D.

## 2017-09-27 NOTE — PROGRESS NOTES
SUBJECTIVE:   Gonzalez Beard is a 57 year old male who presents to clinic today with his wife for the following health issues:    Surgical Followup:  Facility:  [facility]  Date of visit: 8/22/17  Reason for visit: diverticulitis - had sigmoid colectomy - Dr Natalie Hayward  Current Status: feeling ill but better each day     Gonzalez stated that he feels well after surgery on 9/22, out of hospital on 9/25 - removed 8 inches of colon. He has been having some loose stools, but denied nausea or concerning pain. Denied significant blood in urine (post catheter and kidney stent) or stools. His appetite has been fairly normal, following diet recommendations per surgery.     He has had some issues with night sweats. He reported some issues sleeping, but is able to get adequate quality.     On rotating ibuprofen/tylenol schedule for pain and PRN oxycodone.     Follow up planned with surgeon in 2-3 weeks. Discussed return to work, surgeon had recommended ~4 weeks.     Presurgical Note 8/30 --       ED Note 8/18 --   Gonzalez Beard is a 57 year old male with history of diverticulitis who presents with abdominal pain. The patient had a colonoscopy 3 days ago, which was clean, and began having lower abdominal pain a day later. He presents to the emergency department today due to the worsening pain, which he rates 5/10 in severity and describes it as a cramping sensation. He says that this coloscopy was his fifth and he has never had pain like this following his previous four. He also notes that his pain is similar to his previous experiences with diverticulitis, the last being in May. He denies any blood in his stools, hematuria, nausea, vomiting, leg swelling, rash, fever, or other medical concerns. He has been taking oxycodone at home with some relief, his last dose was around 0830.      Problem list and histories reviewed & adjusted, as indicated.  Additional history: as documented    Reviewed and updated as needed this visit by clinical  staff  Tobacco  Allergies  Meds  Med Hx  Surg Hx  Fam Hx  Soc Hx      Reviewed and updated as needed this visit by Provider         BP Readings from Last 3 Encounters:   09/27/17 134/80   09/14/17 122/70   08/18/17 125/76     Wt Readings from Last 4 Encounters:   09/27/17 217 lb (98.4 kg)   09/14/17 230 lb (104.3 kg)   07/18/17 235 lb (106.6 kg)   06/28/17 235 lb (106.6 kg)       Health Maintenance    Health Maintenance Due   Topic Date Due     INFLUENZA VACCINE (SYSTEM ASSIGNED)  09/01/2017       Current Problem List    Patient Active Problem List   Diagnosis     Esophageal reflux     Insomnia     HYPERLIPIDEMIA LDL GOAL <130     Diverticulitis     Shingles     Advanced directives, counseling/discussion       Past Medical History    Past Medical History:   Diagnosis Date     Benign neoplasm of colon 05/2008    multiple with diverticulosis - adenomatous, hyperplastic     Diverticulitis of colon 9/07, 1/17     Esophageal reflux 2005     Hyperlipidemia LDL goal <130 1997     Hyperplastic colon polyp 7/09    due 5 yrs     Inguinal hernia with obstruction, without mention of gangrene, unilateral or unspecified, (not specified as recurrent) 9/19/07    right detected on exam      Insomnia, unspecified      Shingles 4/14    rt face/ear     Stricture and stenosis of esophagus        Past Surgical History    Past Surgical History:   Procedure Laterality Date     ESOPHAGOSCOPY, GASTROSCOPY, DUODENOSCOPY (EGD), COMBINED N/A 1/8/2016    stricture with dilation     HC COLONOSCOPY THRU STOMA W BIOPSY/CAUTERY TUMOR/POLYP/LESION  5/08, 7/09, 7/11, 8/17    multiple polyps with diverticulosis - due 5 yr     HC ESOPHAGOSCOPY, DIAGNOSTIC  5/07, 2/09    Dr Elena - reactive gastropathy - with strictures dilated x 2  = 5/07, 6/15/07      stress echo  9/11    normal     UPPER GI ENDOSCOPY  6/15, 1/16    stricture, food caught, [place] - 1/16 normal [facility]       Current Medications    Current Outpatient Prescriptions   Medication  Sig Dispense Refill     oxyCODONE (ROXICODONE) 5 MG IR tablet   0     acetaminophen (TYLENOL) 500 MG tablet Take 1,000 mg by mouth       pantoprazole (PROTONIX) 40 MG EC tablet Take 1 tablet (40 mg) by mouth daily 90 tablet 3     Probiotic Product (PROBIOTIC DAILY PO) Take 1 capsule by mouth daily       fish oil-omega-3 fatty acids (OMEGA 3) 1000 MG capsule Take 1 capsule by mouth daily. 90 capsule 3     aspirin 81 MG tablet Take  by mouth daily.  3     Multiple Vitamin (MULTIVITAMINS PO) Take 1 tablet by mouth daily          Allergies    No Known Allergies    Immunizations    Immunization History   Administered Date(s) Administered     Influenza Vaccine IM 3yrs+ 4 Valent IIV4 2015     TD (ADULT, 7+) 2017     TDAP Vaccine (Adacel) 2007       Family History    Family History   Problem Relation Age of Onset     C.A.D. 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      [employer]     Social History Main Topics     Smoking status: Former Smoker     Packs/day: 0.75     Years: 25.00     Types: Cigarettes     Smokeless tobacco: Current User     Last attempt to quit: 2010      Comment: quit , 2 tins per week     Alcohol use 3.0 - 3.6 oz/week     5 - 6 Standard drinks or equivalent per week     Drug use: Yes      Comment: occ marijuana     Sexual activity: Yes     Partners: Female     Other Topics Concern     Caffeine Concern Yes     2 cups, <1 can qd     Exercise Yes     regularly exercising     Seat Belt Yes      "Parent/Sibling W/ Cabg, Mi Or Angioplasty Before 65f 55m? No     Social History Narrative       All above reviewed and updated, all stable unless otherwise noted    Recent labs reviewed    ROS:  Constitutional, HEENT, cardiovascular, pulmonary, GI, , musculoskeletal, neuro, skin, endocrine and psych systems are negative, except as in HPI or otherwise noted     This document serves as a record of the services and decisions personally performed and made by Bonilla Guzmán MD FAAFP. It was created on their behalf by Riccardo Terrell, a trained medical scribe. The creation of this document is based the provider's statements to the medical scribe.  Riccardo Terrell September 27, 2017 10:53 AM    OBJECTIVE:                                                    /80  Pulse 73  Temp 97.9  F (36.6  C) (Oral)  Ht 6' 4\" (1.93 m)  Wt 217 lb (98.4 kg)  SpO2 98%  BMI 26.41 kg/m2  Body mass index is 26.41 kg/(m^2).  GENERAL: healthy, alert and no distress  HENT: ear canals and TM's normal upon viewing with otoscope, nose and mouth without ulcers or lesions upon viewing with otoscope  RESP: lungs clear to auscultation - no rales, no rhonchi, no wheezes  CV: regular rates and rhythm, normal S1 S2, no S3 or S4 and no murmur, no click or rub -  ABDOMEN: baseline tenderness noted, no masses  MS: extremities- no gross deformities noted, no edema  SKIN: 5 surgical incisions to abdomen healing well with mild ecchymosis, otherwise no suspicious lesions, no rashes to visible skin, no signs of infection.  NEURO: mentation intact and speech normal  BACK: no CVA tenderness, no paralumbar tenderness  PSYCH: affect- normal    DIAGNOSTICS/PROCEDURES:                                                      Results for orders placed or performed in visit on 09/27/17   CBC with platelets   Result Value Ref Range    WBC 8.5 4.0 - 11.0 10e9/L    RBC Count 5.49 4.4 - 5.9 10e12/L    Hemoglobin 16.4 13.3 - 17.7 g/dL    Hematocrit 45.9 40.0 - 53.0 %    MCV 84 78 - " 100 fl    MCH 29.9 26.5 - 33.0 pg    MCHC 35.7 31.5 - 36.5 g/dL    RDW 12.5 10.0 - 15.0 %    Platelet Count 219 150 - 450 10e9/L        ASSESSMENT/PLAN:                                                        ICD-10-CM    1. Postoperative state Z98.890 Comprehensive metabolic panel     CBC with platelets   2. S/P partial resection of colon Z90.49 Comprehensive metabolic panel     CBC with platelets   3. Diverticulosis of large intestine without hemorrhage K57.30    4. Medication monitoring encounter Z51.81 Comprehensive metabolic panel     CBC with platelets     Discussed treatment/modality options, including risk and benefits, he desires further health care maintenance, further labs, heat/ice/stretching & exercise, OTC meds, wound care discussed, observation, and continue current treatment. All diagnosis above reviewed and noted above, otherwise stable.  See [employer] orders for further details.  Follow up as needed.    Health Maintenance Due   Topic Date Due     INFLUENZA VACCINE (SYSTEM ASSIGNED)  09/01/2017     See Patient Instructions    The information in this document, created by the medical scribe for me, accurately reflects the services I personally performed and the decisions made by me. I have reviewed and approved this document for accuracy.   Bonilla Guzmán MD FAAFP            Bonilla Guzmán MD FAAFP  [address]  [address] [phone] Fax

## 2017-09-27 NOTE — PATIENT INSTRUCTIONS
Activity modification, as discussed    CDC travel website is a good resource for travel recommendations (immunizations, etc)    [facility]                        To reach your care team during and after hours:   [phone]  To reach our pharmacy:        [phone]    Clinic Hours                        Our clinic hours are:    Monday   7:30 am to 7:00 pm                  Tuesday through Friday 7:30 am to 5:00 pm                             Saturday   8:00 am to 12:00 pm      Sunday   Closed      Pharmacy Hours                        Our pharmacy hours are:    Monday   8:30 am to 7:00 pm       Tuesday to Friday  8:30 am to 6:00 pm                       Saturday    9:00 am to 1:00 pm              Sunday    Closed              There is also information available at our web site:  [email or web address]    If your provider ordered any lab tests and you do not receive the results within 10 business days, please call the clinic.    If you need a medication refill please contact your pharmacy.  Please allow 2-3 business days for your refill to be completed.    Our clinic offers telephone visits and e visits.  Please ask one of your team members to explain more.      Use [employer] (secure email communication and access to your chart) to send your primary care provider a message or make an appointment. Ask someone on your Team how to sign up for [employer].  Immunizations                      Immunization History   Administered Date(s) Administered     Influenza Vaccine IM 3yrs+ 4 Valent IIV4 12/08/2015     TD (ADULT, 7+) 06/28/2017     TDAP Vaccine (Adacel) 05/01/2007        Health Maintenance                         Health Maintenance Due   Topic Date Due     Flu Vaccine - yearly  09/01/2017

## 2017-09-27 NOTE — NURSING NOTE
"Chief Complaint   Patient presents with     Hospital F/U       Initial /80  Pulse 73  Temp 97.9  F (36.6  C) (Oral)  Ht 6' 4\" (1.93 m)  Wt 217 lb (98.4 kg)  SpO2 98%  BMI 26.41 kg/m2 Estimated body mass index is 26.41 kg/(m^2) as calculated from the following:    Height as of this encounter: 6' 4\" (1.93 m).    Weight as of this encounter: 217 lb (98.4 kg)..  BP completed using cuff size: chauncey Conway MA  "

## 2017-09-27 NOTE — MR AVS SNAPSHOT
After Visit Summary   9/27/2017    Gonzalez Beard    MRN: [id number]           Patient Information     Date Of Birth          1960        Visit Information        Provider Department      9/27/2017 10:20 AM Bonilla Guzmán MD [facility]        Today's Diagnoses     Postoperative state    -  1    S/P partial resection of colon        Medication monitoring encounter          Care Instructions    Activity modification, as discussed    [facility] travel website is a good resource for travel recommendations (immunizations, etc)    [facility]                        To reach your care team during and after hours:   [phone]  To reach our pharmacy:        [phone]    Clinic Hours                        Our clinic hours are:    Monday   7:30 am to 7:00 pm                  Tuesday through Friday 7:30 am to 5:00 pm                             Saturday   8:00 am to 12:00 pm      Sunday   Closed      Pharmacy Hours                        Our pharmacy hours are:    Monday   8:30 am to 7:00 pm       Tuesday to Friday  8:30 am to 6:00 pm                       Saturday    9:00 am to 1:00 pm              Sunday    Closed              There is also information available at our web site:  [email or web address]    If your provider ordered any lab tests and you do not receive the results within 10 business days, please call the clinic.    If you need a medication refill please contact your pharmacy.  Please allow 2-3 business days for your refill to be completed.    Our clinic offers telephone visits and e visits.  Please ask one of your team members to explain more.      Use [employer] (secure email communication and access to your chart) to send your primary care provider a message or make an appointment. Ask someone on your Team how to sign up for [employer].  Immunizations                      Immunization History   Administered Date(s) Administered     Influenza Vaccine IM 3yrs+ 4 Valent IIV4  "12/08/2015     TD (ADULT, 7+) 06/28/2017     TDAP Vaccine (Adacel) 05/01/2007        Health Maintenance                         Health Maintenance Due   Topic Date Due     Flu Vaccine - yearly  09/01/2017               Follow-ups after your visit        Who to contact     If you have questions or need follow up information about today's clinic visit or your schedule please contact [facility] directly at [phone].  Normal or non-critical lab and imaging results will be communicated to you by [employer], letter or phone within 4 business days after the clinic has received the results. If you do not hear from us within 7 days, please contact the clinic through [email or web address] or phone. If you have a critical or abnormal lab result, we will notify you by phone as soon as possible.  Submit refill requests through [employer] or call your pharmacy and they will forward the refill request to us. Please allow 3 business days for your refill to be completed.          Additional Information About Your Visit        [employer] Information     [employer] gives you secure access to your electronic health record. If you see a primary care provider, you can also send messages to your care team and make appointments. If you have questions, please call your primary care clinic.  If you do not have a primary care provider, please call [phone] and they will assist you.        Care EveryWhere ID     This is your Care EveryWhere ID. This could be used by other organizations to access your [facility] medical records  [id number]        Your Vitals Were     Pulse Temperature Height Pulse Oximetry BMI (Body Mass Index)       73 97.9  F (36.6  C) (Oral) 6' 4\" (1.93 m) 98% 26.41 kg/m2        Blood Pressure from Last 3 Encounters:   09/27/17 134/80   09/14/17 122/70   08/18/17 125/76    Weight from Last 3 Encounters:   09/27/17 217 lb (98.4 kg)   09/14/17 230 lb (104.3 kg)   07/18/17 235 lb (106.6 kg)              We Performed the " Following     CBC with platelets     Comprehensive metabolic panel        Primary Care Provider Office Phone # Fax #    Bonilla Guzmán [phone]       [address] [place] [address]        Equal Access to Services     COREY GALINDO: Hadii aad ku hadvalerianoo Sobronwynali, waaxda luqadaha, qaybta kaalmada adeisabellayada, chelita head félixisabella chaudhry tona galindo. So [facility] [phone].    ATENCIÓN: Si habla español, tiene a perales disposición servicios gratuitos de asistencia lingüística. Llame al [phone].    We comply with applicable federal civil rights laws and [place] laws. We do not discriminate on the basis of race, color, national origin, age, disability sex, sexual orientation or gender identity.            Thank you!     Thank you for choosing [facility]  for your care. Our goal is always to provide you with excellent care. Hearing back from our patients is one way we can continue to improve our services. Please take a few minutes to complete the written survey that you may receive in the mail after your visit with us. Thank you!             Your Updated Medication List - Protect others around you: Learn how to safely use, store and throw away your medicines at www.disposemymeds.org.          This list is accurate as of: 9/27/17 11:06 AM.  Always use your most recent med list.                   Brand Name Dispense Instructions for use Diagnosis    acetaminophen 500 MG tablet    TYLENOL     Take 1,000 mg by mouth        aspirin 81 MG tablet      Take  by mouth daily.        fish oil-omega-3 fatty acids 1000 MG capsule     90 capsule    Take 1 capsule by mouth daily.        MULTIVITAMINS PO      Take 1 tablet by mouth daily        oxyCODONE 5 MG IR tablet    ROXICODONE          pantoprazole 40 MG EC tablet    PROTONIX    90 tablet    Take 1 tablet (40 mg) by mouth daily    Gastroesophageal reflux disease without esophagitis       PROBIOTIC DAILY PO      Take 1 capsule by mouth  daily

## 2017-09-28 LAB
ALBUMIN SERPL-MCNC: 3.8 G/DL (ref 3.4–5)
ALP SERPL-CCNC: 90 U/L (ref 40–150)
ALT SERPL W P-5'-P-CCNC: 100 U/L (ref 0–70)
ANION GAP SERPL CALCULATED.3IONS-SCNC: 6 MMOL/L (ref 3–14)
AST SERPL W P-5'-P-CCNC: 69 U/L (ref 0–45)
BILIRUB SERPL-MCNC: 0.5 MG/DL (ref 0.2–1.3)
BUN SERPL-MCNC: 18 MG/DL (ref 7–30)
CALCIUM SERPL-MCNC: 9.3 MG/DL (ref 8.5–10.1)
CHLORIDE SERPL-SCNC: 103 MMOL/L (ref 94–109)
CO2 SERPL-SCNC: 28 MMOL/L (ref 20–32)
CREAT SERPL-MCNC: 0.98 MG/DL (ref 0.66–1.25)
GFR SERPL CREATININE-BSD FRML MDRD: 79 ML/MIN/1.7M2
GLUCOSE SERPL-MCNC: 96 MG/DL (ref 70–99)
POTASSIUM SERPL-SCNC: 4.9 MMOL/L (ref 3.4–5.3)
PROT SERPL-MCNC: 7.7 G/DL (ref 6.8–8.8)
SODIUM SERPL-SCNC: 137 MMOL/L (ref 133–144)

## 2017-09-28 NOTE — PROGRESS NOTES
Triage, please ensure pt rechecks LFTs.    Gonzalez  I have reviewed your recent labs. Here are the results:    -Liver and gallbladder tests (ALT,AST, Alk phos,bilirubin) are slightly elevated. ADVISE: further testing - Repeat these labs in 2 weeks to ensure they are improving.  Avoid alcohol use and excessive Tylenol use (more than 2000 mg daily)  (DX: elevated LFTs)  -Kidney function (GFR) is stable.  -Sodium is normal.  -Potassium is normal.  -Glucose (diabetic screening test) is normal.      If you have any questions please do not hesitate to contact our office via phone ([phone]) or MyChart.    Tia Blunt, MS, PA-C  [facility] [place]

## 2017-10-02 ENCOUNTER — TELEPHONE (OUTPATIENT)
Dept: FAMILY MEDICINE | Facility: CLINIC | Age: 57
End: 2017-10-02

## 2017-10-02 DIAGNOSIS — G89.18 ACUTE POST-OPERATIVE PAIN: Primary | ICD-10-CM

## 2017-10-02 RX ORDER — OXYCODONE HYDROCHLORIDE 5 MG/1
5 TABLET ORAL EVERY 6 HOURS PRN
Qty: 25 TABLET | Refills: 0 | Status: SHIPPED | OUTPATIENT
Start: 2017-10-02 | End: 2018-05-14

## 2017-10-02 NOTE — TELEPHONE ENCOUNTER
Walked Rx to [facility].  Advised patient to follow up if continued need.    Dyan Torres

## 2017-10-02 NOTE — TELEPHONE ENCOUNTER
Per MD HOOD - please FU with this patient after Sigmoid Colectomy from 09/22/2017.   Patient requesting pain med refills - please pull .     Called   Telephone Information:   Mobile [phone]     States he is getting better - still having some abdominal pain (5-6/10 when not on any pain medication) but that is improving. Is still rotating tylenol\ibuprofen with oxycodone PRN.   States he is still having loose stools but they have been getting more solid in the past 24 hours.   Patient is following a low fiber diet and his appetite isn't where it used to be but is improving every day.    DENIES: N/V, blood in urine/stool    Patient states he will call his surgeon today to schedule a FU appointment.        Disp Refills Start End JERI   oxyCODONE (ROXICODONE) 5 MG IR tablet  0 9/25/2017  --   Class: Historical     Problem List Complete:  No     PROVIDER TO CONSIDER COMPLETION OF PROBLEM LIST AND OVERVIEW/CONTROLLED SUBSTANCE AGREEMENT    Last Office Visit with [facility] primary care provider: 09/27/2017    Future Office visit: None Scheduled    Controlled substance agreement on file: No.     Processing:  Staff will hand deliver Rx to on-site pharmacy     checked in past 6 months?  Yes 10/02/2017 - placed on MD HOOD desk    Routing refill request to provider for review/approval because:  Drug not on the [facility] refill protocol   Drug not active on patient's medication list      Diane Woodard RN  [facility]

## 2017-10-18 ENCOUNTER — TRANSFERRED RECORDS (OUTPATIENT)
Dept: HEALTH INFORMATION MANAGEMENT | Facility: CLINIC | Age: 57
End: 2017-10-18

## 2018-03-27 ENCOUNTER — TELEPHONE (OUTPATIENT)
Dept: FAMILY MEDICINE | Facility: CLINIC | Age: 58
End: 2018-03-27

## 2018-03-27 DIAGNOSIS — R40.0 DAYTIME SLEEPINESS: ICD-10-CM

## 2018-03-27 DIAGNOSIS — R06.83 SNORING: Primary | ICD-10-CM

## 2018-03-27 NOTE — TELEPHONE ENCOUNTER
"*-*-*This message has not been handled.*-*-*    Good Morning Dr Guzmán, Does Gonzalez need a referral from you to make an appt with Dr Leonard at [facility]? (Gonzalez Beard [birth year])  _______________________________________________________________________________________    Consent to communicate on file with wife     Called # [phone]     Wife is requesting this because pt did have a home sleep study and was told by a doctor he was fine but - wife does note and pt does note he very tired during the day and he snores at night. Pt also does not sleep well at night he tends to sleep walk and eats foods when he is \"sleep walking\"  ( he usually eats sweets and wife has to hide them\"      pt has been more wright due to the inability to sleep most nights       Referral pended  - please sing if okay     Please call wife when orders placed     [phone] ok LM     Please advise     Thank you     Arabella Ontiveros RN, BSN  [place] [facility]         "

## 2018-03-27 NOTE — TELEPHONE ENCOUNTER
Referral faxed to [facility]. [facility] will call patient to schedule.    Mckenna Newell  Referral Coordinator

## 2018-04-23 ENCOUNTER — TRANSFERRED RECORDS (OUTPATIENT)
Dept: HEALTH INFORMATION MANAGEMENT | Facility: CLINIC | Age: 58
End: 2018-04-23

## 2018-05-10 ENCOUNTER — TRANSFERRED RECORDS (OUTPATIENT)
Dept: HEALTH INFORMATION MANAGEMENT | Facility: CLINIC | Age: 58
End: 2018-05-10

## 2018-05-13 PROBLEM — G47.33 OSA (OBSTRUCTIVE SLEEP APNEA): Status: ACTIVE | Noted: 2018-01-01

## 2018-05-13 PROBLEM — G25.81 RLS (RESTLESS LEGS SYNDROME): Status: ACTIVE | Noted: 2018-01-01

## 2018-05-14 ENCOUNTER — OFFICE VISIT (OUTPATIENT)
Dept: FAMILY MEDICINE | Facility: CLINIC | Age: 58
End: 2018-05-14
Payer: COMMERCIAL

## 2018-05-14 VITALS
HEART RATE: 92 BPM | HEIGHT: 76 IN | SYSTOLIC BLOOD PRESSURE: 124 MMHG | BODY MASS INDEX: 28.74 KG/M2 | TEMPERATURE: 98.3 F | OXYGEN SATURATION: 96 % | WEIGHT: 236 LBS | DIASTOLIC BLOOD PRESSURE: 68 MMHG

## 2018-05-14 DIAGNOSIS — Z01.818 PREOP GENERAL PHYSICAL EXAM: Primary | ICD-10-CM

## 2018-05-14 DIAGNOSIS — K57.32 DIVERTICULITIS OF LARGE INTESTINE WITHOUT PERFORATION OR ABSCESS WITHOUT BLEEDING: ICD-10-CM

## 2018-05-14 DIAGNOSIS — G25.81 RESTLESS LEG SYNDROME: ICD-10-CM

## 2018-05-14 DIAGNOSIS — K21.9 GASTROESOPHAGEAL REFLUX DISEASE, ESOPHAGITIS PRESENCE NOT SPECIFIED: ICD-10-CM

## 2018-05-14 DIAGNOSIS — G47.33 OSA (OBSTRUCTIVE SLEEP APNEA): ICD-10-CM

## 2018-05-14 DIAGNOSIS — F51.01 PRIMARY INSOMNIA: ICD-10-CM

## 2018-05-14 DIAGNOSIS — M75.102 TEAR OF LEFT ROTATOR CUFF, UNSPECIFIED TEAR EXTENT: ICD-10-CM

## 2018-05-14 LAB
BASOPHILS # BLD AUTO: 0 10E9/L (ref 0–0.2)
BASOPHILS NFR BLD AUTO: 0.3 %
DIFFERENTIAL METHOD BLD: NORMAL
EOSINOPHIL # BLD AUTO: 0.3 10E9/L (ref 0–0.7)
EOSINOPHIL NFR BLD AUTO: 4.5 %
ERYTHROCYTE [DISTWIDTH] IN BLOOD BY AUTOMATED COUNT: 13.1 % (ref 10–15)
ERYTHROCYTE [SEDIMENTATION RATE] IN BLOOD BY WESTERGREN METHOD: 6 MM/H (ref 0–20)
HCT VFR BLD AUTO: 44.9 % (ref 40–53)
HGB BLD-MCNC: 15.5 G/DL (ref 13.3–17.7)
LYMPHOCYTES # BLD AUTO: 1.2 10E9/L (ref 0.8–5.3)
LYMPHOCYTES NFR BLD AUTO: 20 %
MCH RBC QN AUTO: 29.5 PG (ref 26.5–33)
MCHC RBC AUTO-ENTMCNC: 34.5 G/DL (ref 31.5–36.5)
MCV RBC AUTO: 86 FL (ref 78–100)
MONOCYTES # BLD AUTO: 0.7 10E9/L (ref 0–1.3)
MONOCYTES NFR BLD AUTO: 11.1 %
NEUTROPHILS # BLD AUTO: 3.9 10E9/L (ref 1.6–8.3)
NEUTROPHILS NFR BLD AUTO: 64.1 %
PLATELET # BLD AUTO: 199 10E9/L (ref 150–450)
RBC # BLD AUTO: 5.25 10E12/L (ref 4.4–5.9)
WBC # BLD AUTO: 6.1 10E9/L (ref 4–11)

## 2018-05-14 PROCEDURE — 86140 C-REACTIVE PROTEIN: CPT | Performed by: FAMILY MEDICINE

## 2018-05-14 PROCEDURE — 93000 ELECTROCARDIOGRAM COMPLETE: CPT | Performed by: PHYSICIAN ASSISTANT

## 2018-05-14 PROCEDURE — 36415 COLL VENOUS BLD VENIPUNCTURE: CPT | Performed by: PHYSICIAN ASSISTANT

## 2018-05-14 PROCEDURE — 85025 COMPLETE CBC W/AUTO DIFF WBC: CPT | Performed by: PHYSICIAN ASSISTANT

## 2018-05-14 PROCEDURE — 99214 OFFICE O/P EST MOD 30 MIN: CPT | Performed by: PHYSICIAN ASSISTANT

## 2018-05-14 PROCEDURE — 80053 COMPREHEN METABOLIC PANEL: CPT | Performed by: PHYSICIAN ASSISTANT

## 2018-05-14 PROCEDURE — 82728 ASSAY OF FERRITIN: CPT | Performed by: PHYSICIAN ASSISTANT

## 2018-05-14 PROCEDURE — 85652 RBC SED RATE AUTOMATED: CPT | Performed by: FAMILY MEDICINE

## 2018-05-14 NOTE — MR AVS SNAPSHOT
After Visit Summary   5/14/2018    Gonzalez Beard    MRN: [id number]           Patient Information     Date Of Birth          1960        Visit Information        Provider Department      5/14/2018 6:00 PM Nova Merino PA-C [facility]        Today's Diagnoses     Preop general physical exam    -  1    Tear of left rotator cuff, unspecified tear extent        Restless leg syndrome        Diverticulitis of large intestine without perforation or abscess without bleeding        Primary insomnia        LUIS E (obstructive sleep apnea)        Gastroesophageal reflux disease, esophagitis presence not specified          Care Instructions      Before Your Surgery      Call your surgeon if there is any change in your health. This includes signs of a cold or flu (such as a sore throat, runny nose, cough, rash or fever).    Do not smoke, drink alcohol or take over the counter medicine (unless your surgeon or primary care doctor tells you to) for the 24 hours before and after surgery.    If you take prescribed drugs: Follow your doctor s orders about which medicines to take and which to stop until after surgery.    Eating and drinking prior to surgery: follow the instructions from your surgeon    Take a shower or bath the night before surgery. Use the soap your surgeon gave you to gently clean your skin. If you do not have soap from your surgeon, use your regular soap. Do not shave or scrub the surgery site.  Wear clean pajamas and have clean sheets on your bed.           Follow-ups after your visit        Follow-up notes from your care team     Return in about 2 days (around 5/16/2018) for Specialty followup, please be seen sooner if needed.      Your next 10 appointments already scheduled     Luigi 15, 2018 10:40 AM CDT   PHYSICAL with Bonilla Guzmán MD   [facility] ([facility])    [address] [place] [id number] [phone]             "  Who to contact     If you have questions or need follow up information about today's clinic visit or your schedule please contact [facility] directly at [phone].  Normal or non-critical lab and imaging results will be communicated to you by MyChart, letter or phone within 4 business days after the clinic has received the results. If you do not hear from us within 7 days, please contact the clinic through [employer] or phone. If you have a critical or abnormal lab result, we will notify you by phone as soon as possible.  Submit refill requests through [employer] or call your pharmacy and they will forward the refill request to us. Please allow 3 business days for your refill to be completed.          Additional Information About Your Visit        [employer] Information     [employer] gives you secure access to your electronic health record. If you see a primary care provider, you can also send messages to your care team and make appointments. If you have questions, please call your primary care clinic.  If you do not have a primary care provider, please call [phone] and they will assist you.        Care EveryWhere ID     This is your Care EveryWhere ID. This could be used by other organizations to access your [facility] medical records  [id number]        Your Vitals Were     Pulse Temperature Height Pulse Oximetry BMI (Body Mass Index)       92 98.3  F (36.8  C) (Oral) 6' 4\" (1.93 m) 96% 28.73 kg/m2        Blood Pressure from Last 3 Encounters:   05/14/18 124/68   09/27/17 134/80   09/14/17 122/70    Weight from Last 3 Encounters:   05/14/18 236 lb (107 kg)   09/27/17 217 lb (98.4 kg)   09/14/17 230 lb (104.3 kg)              We Performed the Following     CBC with platelets and differential     Comprehensive metabolic panel (BMP + Alb, Alk Phos, ALT, AST, Total. Bili, TP)     CRP, inflammation     EKG 12-lead complete w/read - Clinics     ESR: Erythrocyte sedimentation rate     Ferritin        " Primary Care Provider Office Phone # Fax #    Bonilla Guzmán [phone] [address] [facility] [address]        Equal Access to Services     COREY WHITE : Hadii aad ku hadvalerianoevelyn Sodario, waaxda luqadaha, qaybta kaalmada elsie, chelita mayeserika heardisabella chaudhry tona galindo. So [facility] [phone].    ATENCIÓN: Si habla español, tiene a perales disposición servicios gratuitos de asistencia lingüística. Llame al [phone].    We comply with applicable federal civil rights laws and [place] laws. We do not discriminate on the basis of race, color, national origin, age, disability, sex, sexual orientation, or gender identity.            Thank you!     Thank you for choosing [facility]  for your care. Our goal is always to provide you with excellent care. Hearing back from our patients is one way we can continue to improve our services. Please take a few minutes to complete the written survey that you may receive in the mail after your visit with us. Thank you!             Your Updated Medication List - Protect others around you: Learn how to safely use, store and throw away your medicines at www.disposemymeds.org.          This list is accurate as of 5/14/18 11:59 PM.  Always use your most recent med list.                   Brand Name Dispense Instructions for use Diagnosis    aspirin 81 MG tablet      Take  by mouth daily.        fish oil-omega-3 fatty acids 1000 MG capsule     90 capsule    Take 1 capsule by mouth daily.        MULTIVITAMINS PO      Take 1 tablet by mouth daily        pantoprazole 40 MG EC tablet    PROTONIX    90 tablet    Take 1 tablet (40 mg) by mouth daily    Gastroesophageal reflux disease without esophagitis       PROBIOTIC DAILY PO      Take 1 capsule by mouth daily

## 2018-05-14 NOTE — PROGRESS NOTES
[facility] PRIOR [address] [address]  [phone]  Dept: [phone]    PRE-OP EVALUATION:  Today's date: 2018    Gonzalez Beard (: 1960) presents for pre-operative evaluation assessment as requested by Dr. Riddle.  He requires evaluation and anesthesia risk assessment prior to undergoing surgery/procedure for treatment of Left shoudler .    Proposed Surgery/ Procedure: Rotator Cuff tear - Left Tear  Date of Surgery/ Procedure: 2018  Time of Surgery/ Procedure: [facility]  Hospital/Surgical Facility: [facility]  Fax number for surgical facility: [phone]  Primary Physician: Bonilla Guzmán  Type of Anesthesia Anticipated: General    Patient has a Health Care Directive or Living Will:  NO    1. NO - Do you have a history of heart attack, stroke, stent, bypass or surgery on an artery in the head, neck, heart or legs?  2. NO - Do you ever have any pain or discomfort in your chest?  3. NO - Do you have a history of  Heart Failure?  4. NO - Are you troubled by shortness of breath when: walking on the level, up a slight hill or at night?  5. NO - Do you currently have a cold, bronchitis or other respiratory infection?  6. NO - Do you have a cough, shortness of breath or wheezing?  7. NO - Do you sometimes get pains in the calves of your legs when you walk?  8. NO - Do you or anyone in your family have previous history of blood clots?  9. NO - Do you or does anyone in your family have a serious bleeding problem such as prolonged bleeding following surgeries or cuts?  10. NO - Have you ever had problems with anemia or been told to take iron pills?  11. NO - Have you had any abnormal blood loss such as black, tarry or bloody stools, or abnormal vaginal bleeding?  12. NO - Have you ever had a blood transfusion?  13. NO - Have you or any of your relatives ever had problems with anesthesia?  14. YES - DO YOU HAVE SLEEP APNEA, EXCESSIVE SNORING OR  DAYTIME DROWSINESS? Patient has LUIS E.  Just had a sleep study and is getting put on CPAP.    15. NO - Do you have any prosthetic heart valves?  16. NO - Do you have prosthetic joints?  17. NO - Is there any chance that you may be pregnant?      HPI:     HPI related to upcoming procedure: Left shoulder surgery for pain and rotator cuff tear.        See problem list for active medical problems.  Problems all longstanding and stable, except as noted/documented.  See ROS for pertinent symptoms related to these conditions.                                                                                                                                                          .    MEDICAL HISTORY:     Patient Active Problem List    Diagnosis Date Noted     Primary insomnia      Priority: Medium     sleep iossues - Dr Leonard       LUIS E (obstructive sleep apnea) 01/01/2018     Priority: Medium     [facility] - CPAP - 10 cm       RLS (restless legs syndrome) 01/01/2018     Priority: Medium     Advanced directives, counseling/discussion 06/28/2017     Priority: Medium     Advance Care Planning 6/28/2017: ACP Review of Chart / Resources Provided:  Reviewed chart for advance care plan.  Gonzalez Beard has an advance care plan which needs to be updated. Patient states presence of new/updated ACP document. Copy requested  Added by Cyndy Cornejo      Priority: Medium     rt face/ear       Diverticulitis 04/20/2014     Priority: Medium     HYPERLIPIDEMIA LDL GOAL <130 10/31/2010     Priority: Medium     Insomnia      Priority: Medium     Problem list name updated by automated process. Provider to review       Esophageal reflux 07/21/2005     Priority: Medium      Past Medical History:   Diagnosis Date     Benign neoplasm of colon 05/2008    multiple with diverticulosis - adenomatous, hyperplastic     Diverticulitis of colon 9/07, 1/17     Esophageal reflux 2005     Hyperlipidemia LDL goal <130 1997     Hyperplastic  colon polyp 7/09    due 5 yrs     Inguinal hernia with obstruction, without mention of gangrene, unilateral or unspecified, (not specified as recurrent) 9/19/07    right detected on exam      Insomnia, unspecified     sleep issues - Dr Leonard     LUIS E (obstructive sleep apnea) 2018    [facility] - CPAP - 10 cm     RLS (restless legs syndrome) 2018     Shingles 4/14    rt face/ear     Stricture and stenosis of esophagus      Past Surgical History:   Procedure Laterality Date     ESOPHAGOSCOPY, GASTROSCOPY, DUODENOSCOPY (EGD), COMBINED N/A 1/8/2016    stricture with dilation     HC COLONOSCOPY THRU STOMA W BIOPSY/CAUTERY TUMOR/POLYP/LESION  5/08, 7/09, 7/11, 8/17    multiple polyps with diverticulosis - due 5 yr     HC ESOPHAGOSCOPY, DIAGNOSTIC  5/07, 2/09    Dr Elena - reactive gastropathy - with strictures dilated x 2  = 5/07, 6/15/07      stress echo  9/11    normal     SURGICAL HISTORY OF -   09/2017    Dr Hayward, robotics assisted sigmoid colectomy     UPPER GI ENDOSCOPY  6/15, 1/16    stricture, food caught, [place] - 1/16 normal [facility]     Current Outpatient Prescriptions   Medication Sig Dispense Refill     aspirin 81 MG tablet Take  by mouth daily.  3     fish oil-omega-3 fatty acids (OMEGA 3) 1000 MG capsule Take 1 capsule by mouth daily. 90 capsule 3     Multiple Vitamin (MULTIVITAMINS PO) Take 1 tablet by mouth daily        pantoprazole (PROTONIX) 40 MG EC tablet Take 1 tablet (40 mg) by mouth daily 90 tablet 3     Probiotic Product (PROBIOTIC DAILY PO) Take 1 capsule by mouth daily       OTC products: None, except as noted above - stopped Tylenol    No Known Allergies   Latex Allergy: NO    Social History   Substance Use Topics     Smoking status: Former Smoker     Packs/day: 0.75     Years: 25.00     Types: Cigarettes     Smokeless tobacco: Current User     Last attempt to quit: 4/30/2010      Comment: quit 2010, 2 tins per week     Alcohol use 3.0 - 3.6 oz/week     5 - 6 Standard drinks or equivalent  "per week     History   Drug Use     Yes     Comment: occ marijuana       REVIEW OF SYSTEMS:   Constitutional, neuro, ENT, endocrine, pulmonary, cardiac, gastrointestinal, genitourinary, musculoskeletal, integument and psychiatric systems are negative, except as otherwise noted.    EXAM:   /68 (BP Location: Right arm, Patient Position: Chair, Cuff Size: Adult Large)  Pulse 92  Temp 98.3  F (36.8  C) (Oral)  Ht 6' 4\" (1.93 m)  Wt 236 lb (107 kg)  SpO2 96%  BMI 28.73 kg/m2    GENERAL APPEARANCE: healthy, alert and no distress     EYES: EOMI,  PERRL     HENT: ear canals and TM's normal and nose and mouth without ulcers or lesions     NECK: no adenopathy, no asymmetry, masses, or scars and thyroid normal to palpation     RESP: lungs clear to auscultation - no rales, rhonchi or wheezes     CV: regular rates and rhythm, normal S1 S2, no S3 or S4 and no murmur, click or rub     ABDOMEN:  soft, nontender, no HSM or masses and bowel sounds normal     MS: extremities normal- no gross deformities noted, no evidence of inflammation in joints, FROM in all extremities.     SKIN: no suspicious lesions or rashes     NEURO: Normal strength and tone, sensory exam grossly normal, mentation intact and speech normal     PSYCH: mentation appears normal. and affect normal/bright     LYMPHATICS: No cervical adenopathy    DIAGNOSTICS:     EKG: appears normal, NSR, normal axis, normal intervals, no acute ST/T changes c/w ischemia, no LVH by voltage criteria  Labs Resulted Today:   Results for orders placed or performed in visit on 05/14/18   CBC with platelets and differential   Result Value Ref Range    WBC 6.1 4.0 - 11.0 10e9/L    RBC Count 5.25 4.4 - 5.9 10e12/L    Hemoglobin 15.5 13.3 - 17.7 g/dL    Hematocrit 44.9 40.0 - 53.0 %    MCV 86 78 - 100 fl    MCH 29.5 26.5 - 33.0 pg    MCHC 34.5 31.5 - 36.5 g/dL    RDW 13.1 10.0 - 15.0 %    Platelet Count 199 150 - 450 10e9/L    Diff Method Automated Method     % Neutrophils 64.1 % "    % Lymphocytes 20.0 %    % Monocytes 11.1 %    % Eosinophils 4.5 %    % Basophils 0.3 %    Absolute Neutrophil 3.9 1.6 - 8.3 10e9/L    Absolute Lymphocytes 1.2 0.8 - 5.3 10e9/L    Absolute Monocytes 0.7 0.0 - 1.3 10e9/L    Absolute Eosinophils 0.3 0.0 - 0.7 10e9/L    Absolute Basophils 0.0 0.0 - 0.2 10e9/L   Ferritin   Result Value Ref Range    Ferritin 177 26 - 388 ng/mL   ESR: Erythrocyte sedimentation rate   Result Value Ref Range    Sed Rate 6 0 - 20 mm/h   CRP, inflammation   Result Value Ref Range    CRP Inflammation 4.4 0.0 - 8.0 mg/L     Labs Drawn and in Process:   Unresulted Labs Ordered in the Past 30 Days of this Admission     Date and Time Order Name Status Description    5/14/2018 1823 COMPREHENSIVE METABOLIC PANEL In process           Recent Labs   Lab Test  09/27/17   1103  09/14/17   1047   HGB  16.4  14.5   PLT  219  162   NA  137  139   POTASSIUM  4.9  4.2   CR  0.98  0.85        IMPRESSION:   Reason for surgery/procedure: Treatment of rotator cuff tear - Left Shoulder  Diagnosis/reason for consult: Pre-op for surgical clearance    The proposed surgical procedure is considered INTERMEDIATE risk.    REVISED CARDIAC RISK INDEX  The patient has the following serious cardiovascular risks for perioperative complications such as (MI, PE, VFib and 3  AV Block):  No serious cardiac risks  INTERPRETATION: 0 risks: Class I (very low risk - 0.4% complication rate)    The patient has the following additional risks for perioperative complications:  Newly diagnosed sleep apnea, not yet treated.        ICD-10-CM    1. Preop general physical exam Z01.818 EKG 12-lead complete w/read - Clinics     CBC with platelets and differential     Comprehensive metabolic panel (BMP + Alb, Alk Phos, ALT, AST, Total. Bili, TP)   2. Tear of left rotator cuff, unspecified tear extent M75.102    3. Restless leg syndrome G25.81 Ferritin   4. Diverticulitis of large intestine without perforation or abscess without bleeding K57.32  ESR: Erythrocyte sedimentation rate     CRP, inflammation   5. Primary insomnia F51.01    6. LUIS E (obstructive sleep apnea) G47.33    7. Gastroesophageal reflux disease, esophagitis presence not specified K21.9        RECOMMENDATIONS:     -- Scheduled as a same day surgery.  Please monitor accordingly.  Consult hospital rounder / IM to assist post-op medical management as needed.      --Patient is to take all scheduled medications on the day of surgery EXCEPT for modifications listed below.    Anticoagulant or Antiplatelet Medication Use  ASPIRIN: Discontinue ASA 7-10 days prior to procedure to reduce bleeding risk.  It should be resumed post-operatively.  Of NOTE:  Patient stopped aspirin only 2 days before procedure.    Patient also advised to stop the fish oil and multi-vitamin, but this was only stopped 2 days before surgery.          APPROVAL GIVEN to proceed with proposed procedure, without further diagnostic evaluation       Signed Electronically by: LOLA Alexis MD, FAAFP    [address]  [address] [phone] Fax      Copy of this evaluation report is provided to requesting physician.    [place] Preop Guidelines    Revised Cardiac Risk Index

## 2018-05-15 LAB
CRP SERPL-MCNC: 4.4 MG/L (ref 0–8)
FERRITIN SERPL-MCNC: 177 NG/ML (ref 26–388)

## 2018-05-16 LAB
ALBUMIN SERPL-MCNC: 3.8 G/DL (ref 3.4–5)
ALP SERPL-CCNC: 112 U/L (ref 40–150)
ALT SERPL W P-5'-P-CCNC: 39 U/L (ref 0–70)
ANION GAP SERPL CALCULATED.3IONS-SCNC: 7 MMOL/L (ref 3–14)
AST SERPL W P-5'-P-CCNC: 28 U/L (ref 0–45)
BILIRUB SERPL-MCNC: 0.4 MG/DL (ref 0.2–1.3)
BUN SERPL-MCNC: 19 MG/DL (ref 7–30)
CALCIUM SERPL-MCNC: 8.8 MG/DL (ref 8.5–10.1)
CHLORIDE SERPL-SCNC: 107 MMOL/L (ref 94–109)
CO2 SERPL-SCNC: 26 MMOL/L (ref 20–32)
CREAT SERPL-MCNC: 1.01 MG/DL (ref 0.66–1.25)
GFR SERPL CREATININE-BSD FRML MDRD: 76 ML/MIN/1.7M2
GLUCOSE SERPL-MCNC: 106 MG/DL (ref 70–99)
POTASSIUM SERPL-SCNC: 4.1 MMOL/L (ref 3.4–5.3)
PROT SERPL-MCNC: 7.1 G/DL (ref 6.8–8.8)
SODIUM SERPL-SCNC: 140 MMOL/L (ref 133–144)

## 2018-05-17 NOTE — PROGRESS NOTES
Michael Roth ,    The results from your recent lab work are within normal limits.    -Liver and gallbladder tests are normal. (ALT,AST, Alk phos, bilirubin), kidney function is normal (Cr, GFR), Sodium is normal, Potassium is normal, Calcium is normal, Glucose is normal (diabetes screening test).   -Normal red blood cell (hgb) levels, normal white blood cell count and normal platelet levels.  -Ferritin (iron) level is normal.      Thank you for choosing [facility] for your health care needs,      Nova Merino PA-C

## 2018-05-26 ENCOUNTER — TELEPHONE (OUTPATIENT)
Dept: FAMILY MEDICINE | Facility: CLINIC | Age: 58
End: 2018-05-26

## 2018-05-26 DIAGNOSIS — R13.10 DYSPHAGIA, UNSPECIFIED TYPE: Primary | ICD-10-CM

## 2018-05-30 NOTE — TELEPHONE ENCOUNTER
Pt received a call from endoscopy and will schedule.    The patient indicates understanding of these issues and agrees with the plan.  Dorothy Lo RN  [facility]

## 2018-05-30 NOTE — TELEPHONE ENCOUNTER
Attempt # 1    Called #   Telephone Information:   Mobile [phone]       Left a non detailed VM     Arabella Ontiveros RN, BSN  [facility]

## 2018-06-04 ENCOUNTER — SURGERY (OUTPATIENT)
Age: 58
End: 2018-06-04

## 2018-06-04 ENCOUNTER — HOSPITAL ENCOUNTER (OUTPATIENT)
Facility: CLINIC | Age: 58
Discharge: HOME | End: 2018-06-04
Attending: INTERNAL MEDICINE | Admitting: INTERNAL MEDICINE
Payer: COMMERCIAL

## 2018-06-04 VITALS
SYSTOLIC BLOOD PRESSURE: 134 MMHG | OXYGEN SATURATION: 95 % | RESPIRATION RATE: 12 BRPM | DIASTOLIC BLOOD PRESSURE: 82 MMHG

## 2018-06-04 LAB — UPPER GI ENDOSCOPY: NORMAL

## 2018-06-04 PROCEDURE — 88305 TISSUE EXAM BY PATHOLOGIST: CPT | Mod: 26 | Performed by: INTERNAL MEDICINE

## 2018-06-04 PROCEDURE — 25000128 H RX IP 250 OP 636: Performed by: INTERNAL MEDICINE

## 2018-06-04 PROCEDURE — 43245 EGD DILATE STRICTURE: CPT | Performed by: INTERNAL MEDICINE

## 2018-06-04 PROCEDURE — 88305 TISSUE EXAM BY PATHOLOGIST: CPT | Performed by: INTERNAL MEDICINE

## 2018-06-04 PROCEDURE — 40000104 ZZH STATISTIC MODERATE SEDATION < 10 MIN: Performed by: INTERNAL MEDICINE

## 2018-06-04 PROCEDURE — 43248 EGD GUIDE WIRE INSERTION: CPT | Performed by: INTERNAL MEDICINE

## 2018-06-04 PROCEDURE — 43239 EGD BIOPSY SINGLE/MULTIPLE: CPT | Mod: XS | Performed by: INTERNAL MEDICINE

## 2018-06-04 PROCEDURE — 25000125 ZZHC RX 250: Performed by: INTERNAL MEDICINE

## 2018-06-04 RX ORDER — FENTANYL CITRATE 50 UG/ML
INJECTION, SOLUTION INTRAMUSCULAR; INTRAVENOUS PRN
Status: DISCONTINUED | OUTPATIENT
Start: 2018-06-04 | End: 2018-06-04 | Stop reason: HOSPADM

## 2018-06-04 RX ORDER — LIDOCAINE 40 MG/G
CREAM TOPICAL
Status: DISCONTINUED | OUTPATIENT
Start: 2018-06-04 | End: 2018-06-04 | Stop reason: HOSPADM

## 2018-06-04 RX ORDER — ONDANSETRON 4 MG/1
4 TABLET, ORALLY DISINTEGRATING ORAL EVERY 6 HOURS PRN
Status: DISCONTINUED | OUTPATIENT
Start: 2018-06-04 | End: 2018-06-04 | Stop reason: HOSPADM

## 2018-06-04 RX ORDER — NALOXONE HYDROCHLORIDE 0.4 MG/ML
.1-.4 INJECTION, SOLUTION INTRAMUSCULAR; INTRAVENOUS; SUBCUTANEOUS
Status: DISCONTINUED | OUTPATIENT
Start: 2018-06-04 | End: 2018-06-04 | Stop reason: HOSPADM

## 2018-06-04 RX ORDER — ONDANSETRON 2 MG/ML
4 INJECTION INTRAMUSCULAR; INTRAVENOUS
Status: DISCONTINUED | OUTPATIENT
Start: 2018-06-04 | End: 2018-06-04 | Stop reason: HOSPADM

## 2018-06-04 RX ORDER — FLUMAZENIL 0.1 MG/ML
0.2 INJECTION, SOLUTION INTRAVENOUS
Status: DISCONTINUED | OUTPATIENT
Start: 2018-06-04 | End: 2018-06-04 | Stop reason: HOSPADM

## 2018-06-04 RX ORDER — ONDANSETRON 2 MG/ML
4 INJECTION INTRAMUSCULAR; INTRAVENOUS EVERY 6 HOURS PRN
Status: DISCONTINUED | OUTPATIENT
Start: 2018-06-04 | End: 2018-06-04 | Stop reason: HOSPADM

## 2018-06-04 RX ADMIN — MIDAZOLAM 2 MG: 1 INJECTION INTRAMUSCULAR; INTRAVENOUS at 08:12

## 2018-06-04 RX ADMIN — MIDAZOLAM 1 MG: 1 INJECTION INTRAMUSCULAR; INTRAVENOUS at 08:17

## 2018-06-04 RX ADMIN — FENTANYL CITRATE 100 MCG: 50 INJECTION, SOLUTION INTRAMUSCULAR; INTRAVENOUS at 08:12

## 2018-06-04 RX ADMIN — MIDAZOLAM 1 MG: 1 INJECTION INTRAMUSCULAR; INTRAVENOUS at 08:13

## 2018-06-04 RX ADMIN — TOPICAL ANESTHETIC 1 SPRAY: 200 SPRAY DENTAL; PERIODONTAL at 08:12

## 2018-06-04 NOTE — PROCEDURES
Pre-Endoscopy History and Physical     Gonzalez Beard MRN# [id number]   YOB: 1960 Age: 57 year old     Date of Procedure: 6/4/2018  Primary care provider: Bonilla Guzmán  Type of Endoscopy: esophagogastroduodenoscopy (upper GI endoscopy)  Reason for Procedure: dysphagia  Type of Anesthesia Anticipated: Moderate (conscious) sedation    HPI:    Gonzalez is a 57 year old male who will be undergoing the above procedure.      A history and physical has been performed. The patient's medications and allergies have been reviewed. The risks and benefits of the procedure and the sedation options and risks were discussed with the patient.  All questions were answered and informed consent was obtained.      No Known Allergies     Current Facility-Administered Medications   Medication     0.9% sodium chloride BOLUS     lidocaine (LMX4) kit     lidocaine 1 % 1 mL     ondansetron (ZOFRAN) injection 4 mg     sodium chloride (PF) 0.9% PF flush 3 mL     sodium chloride (PF) 0.9% PF flush 3 mL     sodium chloride (PF) 0.9% PF flush 3 mL       Patient Active Problem List   Diagnosis     Esophageal reflux     Insomnia     HYPERLIPIDEMIA LDL GOAL <130     Diverticulitis     Shingles     Advanced directives, counseling/discussion     Primary insomnia     LUIS E (obstructive sleep apnea)     RLS (restless legs syndrome)        Past Medical History:   Diagnosis Date     Benign neoplasm of colon 05/2008    multiple with diverticulosis - adenomatous, hyperplastic     Diverticulitis of colon 9/07, 1/17     Esophageal reflux 2005     Hyperlipidemia LDL goal <130 1997     Hyperplastic colon polyp 7/09    due 5 yrs     Inguinal hernia with obstruction, without mention of gangrene, unilateral or unspecified, (not specified as recurrent) 9/19/07    right detected on exam      Insomnia, unspecified     sleep issues - Dr Leonard     LUIS E (obstructive sleep apnea) 2018    [facility] - CPAP - 10 cm     RLS (restless legs syndrome) 2018     Shingles      rt face/ear     Stricture and stenosis of esophagus         Past Surgical History:   Procedure Laterality Date     COLECTOMY  2017    Dr Hayward, robotics assisted sigmoid colectomy     COLONOSCOPY  , , ,     multiple polyps with diverticulosis - due 5 yr     ESOPHAGOSCOPY, GASTROSCOPY, DUODENOSCOPY (EGD), COMBINED N/A 2016    stricture with dilation     HC ESOPHAGOSCOPY, DIAGNOSTIC  ,     Dr Elena - reactive gastropathy - with strictures dilated x 2  = , 6/15/07      stress echo      normal     UPPER GI ENDOSCOPY  6/15,     stricture, food caught, [place] -  normal [facility]       Social History   Substance Use Topics     Smoking status: Current Every Day Smoker     Packs/day: 0.75     Years: 25.00     Types: Cigarettes     Smokeless tobacco: Former User     Quit date: 2010      Comment: quit , 2 tins per week     Alcohol use 3.0 - 3.6 oz/week     5 - 6 Standard drinks or equivalent per week      Comment: 3 x week       Family History   Problem Relation Age of Onset     C.A.D. Father 60      at age 86     Hypertension Father      CEREBROVASCULAR DISEASE Father 65     Prostate Cancer Father 65     Coronary Artery Disease Paternal Grandfather      Coronary Artery Disease Brother 60     bypass x 5 - CHF     DIABETES Brother 50     Colon Cancer Brother 63     Breast Cancer No family hx of             Medications:     Prescriptions Prior to Admission   Medication Sig Dispense Refill Last Dose     aspirin 81 MG tablet Take  by mouth daily.  3 6/3/2018     fish oil-omega-3 fatty acids (OMEGA 3) 1000 MG capsule Take 1 capsule by mouth daily. 90 capsule 3 6/3/2018     Multiple Vitamin (MULTIVITAMINS PO) Take 1 tablet by mouth daily    6/3/2018     pantoprazole (PROTONIX) 40 MG EC tablet Take 1 tablet (40 mg) by mouth daily 90 tablet 3 6/3/2018     Probiotic Product (PROBIOTIC DAILY PO) Take 1 capsule by mouth daily   6/3/2018       Scheduled Medications:     "sodium chloride 0.9%  500 mL Intravenous Once     sodium chloride (PF)  3 mL Intracatheter Q8H       PRN:  lidocaine 4%, lidocaine (buffered or not buffered), ondansetron, sodium chloride (PF), sodium chloride (PF)    PHYSICAL EXAM:   /75  Resp 19  SpO2 97% Estimated body mass index is 28.73 kg/(m^2) as calculated from the following:    Height as of 5/14/18: 1.93 m (6' 4\").    Weight as of 5/14/18: 107 kg (236 lb).   RESP: lungs clear to auscultation - no rales, rhonchi or wheezes  CV: regular rates and rhythm    IMPRESSION   ASA Class 2 - Mild systemic disease      Signed Electronically by: Reginald Gay MD  June 4, 2018    .            "

## 2018-06-04 NOTE — LETTER
May 30, 2018  Gonzalez Beard  [address] [facility] [address]        Thank you for choosing [facility]. You are scheduled for the following service.   Date:   Monday, June 4      Procedure: UPPER ENDOSCOPY-EGD  Doctor: Dr. Darnell Gay           Arrival Time:    7:30am *check in at Emergency/Endoscopy desk*  Procedure Time:  8am     Location:   [facility]        Endoscopy Department, First Floor (Enter through ER Doors) *         [address] [place], [place] [address] [phone] or [phone] () to reschedule        PRE-PROCEDURE CHECKLIST    If you have diabetes, ask your regular doctor for diet and medication restrictions.  If you take any antiplatelet or anticoagulant medications (such as Coumadin, Lovenox, Plavix, etc.) and have not already discussed this, please call your primary physician for advice on holding this medication.  If you take Aspirin, you may continue to do so.  If you are or may be pregnant, please discuss the risks and benefits of this procedure with your doctor.  You must arrange for a ride for the day of your exam. If you fail to arrange transportation with a responsible adult, your procedure will need to be cancelled and rescheduled. Taxi, bus and medical transport are not acceptable unless you have a responsible adult that you know & trust with you. Please arrange for this  to be able to pick you up in our department, approximately one hour after your scheduled procedure, if they are not able to stay with you.      Canceling or rescheduling   If you must cancel or reschedule your appointment, please call [phone] as soon as possible.      Upper Endoscopy or Esophagogastroduodenoscopy (EGD) is a test performed to evaluate symptoms of persistent abdominal pain, nausea, vomiting and difficulty swallowing. It may also be used to treat various conditions of the upper gastrointestinal tract, such  as bleeding, narrowing or abnormal growths.     What happens during an upper endoscopy?  On the day of your procedure, plan to spend up to one and a half hour after your arrival at the endoscopy center. The exam itself takes about 5 to 10 minutes.    Before the exam:  - You will change into a gown.   - Your medical history and medication list will be reviewed with you, unless it has already been done over the phone.   - A nurse will insert an intravenous (IV) line into your hand or arm.  - The doctor will talk to you and give you a consent form to sign.    During the exam:  - Medicine will be given through the IV line to help you relax and feel comfortable.   - Your heart rate and oxygen levels will be monitored. If your blood pressure is low, you may be given fluids through the IV line.   - The doctor will insert a flexible, hollow tube, called an endoscope, into your mouth and will advance it slowly through the esophagus, stomach and duodenum (the first part of your small intestine).   - You may have a feeling of pressure or fullness.   - If you have difficulty swallowing, and the doctor finds a narrowing in your esophagus, it may be possible for the area to be expanded-dilated during the exam.   - If abnormal tissue is found, the doctor may remove it through the endoscope (biopsy it) for closer examination. The tissue removal is painless.    After the exam:  - Any tissue samples removed during the exam will be sent to a lab for evaluation. It may take 5 to 7 working days for you to be notified of the results  - The doctor will prepare a full report for the physician who referred you for the upper endoscopy.   - The doctor will talk with you about the initial results of your exam.   - You may feel bloated after the procedure. That is normal and should not last long.   - Your throat may feel sore for a short time.   - Following the exam, you may resume your normal diet. Avoid alcohol until the next day.   - You may  resume your regular activities the day after the procedure.   - Medication given during the exam will prohibit you from driving for the rest of the day.  - A nurse will provide you with complete discharge instructions before you leave the endoscopy center. Be sure to ask the nurse for specific instructions if you take blood thinners such as Aspirin , Coumadin , Lovenox , Plavix , etc.       PREPARATION    To ensure a successful exam, please follow all instructions carefully.      The night before your exam:    STOP eating solid foods at 11:45 pm.     Clear liquids are okay to drink (examples: Gatorade , apple juice, clear broth, etc.).     DO NOT drink red liquids or alcoholic beverages.    The day of your exam:    STOP drinking clear liquids 4 hours before your exam.     You may take your usual medications with 4 oz. of water, but it needs to be at least 4 hours prior to your procedure.    When you leave for the procedure:    Bring a list of all of your current medications, including any allergy or over-the-counter medications, unless you have already reviewed that with an Endoscopy RN over the phone.     Bring a photo ID as well as up-to-date insurance information, such as your insurance card and any referral forms that might be required by your payer.         DIRECTIONS TO THE ENDOSCOPY DEPARTMENT    From the north ([facility])  Take [address] south, exit on [address] . Get into the left hand estefany, turn left (east), go one-half mile to [address] and turn left. Go north to the first stoplight, take a right on [address] and follow it to the Emergency entrance.    From the south ([facility])  Take 35N to the 35E split and exit on [address] . On [address] , turn left (west) to [address]. Turn right (north) on [address]. Go north to the first stoplight, take a right on [address] and follow it to the Emergency entrance.    From the east via 35E  ([facility])  Take [address]E south to [address]  exit. Turn right on [address]. Go west to [address]. Turn right (north) on [address]. Go to the first stoplight, take a right and follow on [address] to the Emergency entrance.    From the east via [address] ([facility])  Take [address] to [address]. Turn left (south) on [address] to [address]. Turn left (east) on [address] and follow it to the Emergency entrance.    From the west via [address] ([place])  Take [address] east to [address]. Turn right (south) on [address] to [address]. Turn left (east) on [address] and follow it to the Emergency entrance.

## 2018-06-04 NOTE — IP AVS SNAPSHOT
MRN:[id number]                      After Visit Summary   6/4/2018    Gonzalez Beard    MRN: [id number]           Thank you!     Thank you for choosing [facility] for your care. Our goal is always to provide you with excellent care. Hearing back from our patients is one way we can continue to improve our services. Please take a few minutes to complete the written survey that you may receive in the mail after you visit. If you would like to speak to someone directly about your visit please contact Patient Relations at [phone]. Thank you!          Patient Information     Date Of Birth          1960        About your hospital stay     You were admitted on:  June 4, 2018 You last received care in the:  [facility] Endoscopy    You were discharged on:  June 4, 2018       Who to Call     For medical emergencies, please call 911.  For non-urgent questions about your medical care, please call your primary care provider or clinic, [phone]  For questions related to your surgery, please call your surgery clinic        Attending Provider     Provider Specialty    Reginald Gay MD Gastroenterology       Primary Care Provider Office Phone # Fax #    Bonilla Guzmán [phone]      Your next 10 appointments already scheduled     Luigi 15, 2018 10:40 AM CDT   PHYSICAL with Bonilla Guzmán MD   [facility]    [address] [place] [id number] [phone]              Pending Results     No orders found from 6/2/2018 to 6/5/2018.            Admission Information     Date & Time Provider Department Dept. Phone    6/4/2018 Reginald Gay MD [facility] Endoscopy [phone]      Your Vitals Were     Blood Pressure Respirations Pulse Oximetry             120/76 16 95%         MyChart Information     MyChart gives you secure access to your electronic health record. If you see a primary care  provider, you can also send messages to your care team and make appointments. If you have questions, please call your primary care clinic.  If you do not have a primary care provider, please call [phone] and they will assist you.        Care EveryWhere ID     This is your Care EveryWhere ID. This could be used by other organizations to access your [place] medical records  [id number]        Equal Access to Services     DREW WHITE : Hadii pallavi roa hadasho Sobronwynali, waaxda luqadaha, qaybta kaalmada adeisabelladanteda, chelita nevarezrosalbamariza galindo. So [facility] [phone].    ATENCIÓN: Si habla español, tiene a perales disposición servicios gratuitos de asistencia lingüística. Llame al [phone].    We comply with applicable federal civil rights laws and [place] laws. We do not discriminate on the basis of race, color, national origin, age, disability, sex, sexual orientation, or gender identity.               Review of your medicines      CONTINUE these medicines which have NOT CHANGED        Dose / Directions    aspirin 81 MG tablet        Take  by mouth daily.   Refills:  3       fish oil-omega-3 fatty acids 1000 MG capsule        Dose:  1 g   Take 1 capsule by mouth daily.   Quantity:  90 capsule   Refills:  3       MULTIVITAMINS PO        Dose:  1 tablet   Take 1 tablet by mouth daily   Refills:  0       pantoprazole 40 MG EC tablet   Commonly known as:  PROTONIX   Used for:  Gastroesophageal reflux disease without esophagitis        Dose:  40 mg   Take 1 tablet (40 mg) by mouth daily   Quantity:  90 tablet   Refills:  3       PROBIOTIC DAILY PO        Dose:  1 capsule   Take 1 capsule by mouth daily   Refills:  0                Protect others around you: Learn how to safely use, store and throw away your medicines at www.disposemymeds.org.             Medication List: This is a list of all your medications and when to take them. Check marks below indicate your daily home schedule. Keep this list as a  reference.      Medications           Morning Afternoon Evening Bedtime As Needed    aspirin 81 MG tablet   Take  by mouth daily.                                fish oil-omega-3 fatty acids 1000 MG capsule   Take 1 capsule by mouth daily.                                MULTIVITAMINS PO   Take 1 tablet by mouth daily                                pantoprazole 40 MG EC tablet   Commonly known as:  PROTONIX   Take 1 tablet (40 mg) by mouth daily                                PROBIOTIC DAILY PO   Take 1 capsule by mouth daily                                          More Information        Eosinophilic Esophagitis (EoE)  Eosinophilic esophagitis (EoE) is an allergic reaction. It occurs in the esophagus. This is the tube that leads from the mouth down to the stomach. The immune system responds to an allergen by making white blood cells called eosinophils. The esophagus then becomes red and swollen. EoE is much more common in people with asthma or allergies.  What causes eosinophilic esophagitis?  Researchers are working to understand the causes of EoE. It may be caused by allergens in the environment. Or it may be caused by food allergens. Foods that are often found to be the cause of EoE include wheat, dairy foods, eggs, and soy. You may be at higher risk for EoE if you have a family history of allergies or EoE as there may be a genetic link.  Symptoms of eosinophilic esophagitis  Symptoms of EoE vary from person to person and may include:    Trouble swallowing or painful swallowing    Chest pain    Pain in the belly (abdomen)    Vomiting    Food getting stuck in the throat (a medical emergency)  Diagnosing eosinophilic esophagitis  Your healthcare provider will ask about your health history and symptoms. He or she will likely want to test you for allergies. You may have an endoscopy. A thin, flexible tube (endoscope) is passed through your mouth and down your throat. The scope has a camera. This lets your healthcare  provider look at your esophagus. The doctor will check for signs of inflammation and an increased number of eosinophils. You may have a biopsy during the procedure. This is a small tissue sample taken from your esophagus. In some situations, stretching (dilation) will be performed if the esophagus is narrowed.  Other diseases can cause eosinophils in the esophagus. These include gastroesophageal reflux disease (GERD) and inflammatory bowel disease. Your healthcare provider will check for these.  Treatment for eosinophilic esophagitis  You will likely work with special doctors for treatment. You may see an allergy doctor. And you may see a doctor who treats digestive problems (gastroenterologist). Your treatment may include:    Medicine. No medicines can cure EoE. but some can help reduce the redness and swelling. These include corticosteroids and proton pump inhibitors.    Elimination diet. Not eating certain foods can also help reduce the redness and swelling in your esophagus. These may include dairy foods, eggs, wheat, soy, peanuts, tree nuts, and fish. Your healthcare team will give you a plan for finding out what foods may cause your symptoms. This will likely include an elimination diet. On this type of diet, you eat very few foods at first. You then slowly add more foods to see if you have a reaction. An EoE reaction may take days or weeks to develop. Keep this in mind when starting a food elimination diet. It may take some time after avoiding a food to see if that strategy worked.  Living with EoE  You can help manage EoE by learning what things cause your allergic reaction. You will need to avoid these things ongoing to prevent symptoms of EoE. If medicines are prescribed, take them as directed. If you have difficulty swallowing, your healthcare provider can recommend certain eating habits to improve your symptoms. Repeat endoscopy is sometimes needed to check for improvement in the esophagus.  When to call  your healthcare provider  Call your healthcare provider if you have any of the following:    Chest pain    Unexplained weight loss    Vomiting   When to call 911  Call 911 if you have:    Food stuck in your throat    Trouble breathing or talking    Trouble handling your spit or secretions   Date Last Reviewed: 8/1/2017 2000-2017 [employer]. [address] [place] [address]. All rights reserved. This information is not intended as a substitute for professional medical care. Always follow your healthcare professional's instructions.                Hiatal Hernia  The diaphragm is a thin sheet of muscle that runs across the top of the stomach. The small opening in the diaphragm where the esophagus and stomach meet is called the hiatus. When you eat, the muscle around the hiatus relaxes to let food pass from the esophagus into the stomach. The hiatus tightens to keep food and acid in the stomach.  In some people, the hiatus is too large or the muscle around it is weak. Then the top of the stomach can push upward through the hiatus. This is called a hiatal hernia. A hiatal hernia can let stomach acid flow back up the esophagus. This is called acid reflux.  Hiatal hernias are common. The cause of a hiatal hernia is not certain, but some things may make it more likely. These may include obesity, pregnancy, and vomiting or coughing.  Many people with hiatal hernia do not have symptoms. Often the symptoms are like those of GERD or acid reflux. They can include:    Burning feeling under the breastbone (heartburn)    Other chest discomfort    Frequent burping    Food coming back up into the throat or mouth    Acid taste in the mouth    Trouble swallowing food or liquid    Nighttime choking, coughing, or wheezing    Feeling full after eating only a small amount of food    Nausea and vomiting  Treatment can reduce symptoms. This includes medicines and lifestyle changes. In severe cases, you may need  surgery to tighten the hiatus.  Home care  Medicines help control symptoms. They cannot fix the hernia.     Antacids help neutralize the normal acids in your stomach. You may find one works better than another for you.     Acid blockers (H2 blockers) decrease acid production.     Acid inhibitors (PPIs) decrease acid production in a different way than the blockers.     Don't take NSAIDs such as aspirin, ibuprofen, and naproxen. These may worsen symptoms in some people. If you are taking these medicines for another medical problem, talk to your healthcare provider before stopping them.  Lifestyle changes are important in treating your symptoms. You can help reduce or relieve your symptoms if you:    Stop smoking and using tobacco. Also if possible, avoid second-hand smoke.    Lose excess weight. Excess weight puts pressure on the stomach and esophagus.    Symptoms can be worsened by certain foods. Limit or avoid fatty, fried, and spicy foods, as well as coffee, chocolate, mint, and foods with high acid content such as tomatoes and citrus fruit and juices (orange, grapefruit, lemon).     Eat smaller amounts at a time. Avoid getting overfull.    Don't use alcohol, caffeine, or tobacco. They can delay healing and worsen your symptoms.  Follow-up care  Follow up with your healthcare provider. Regular visits may be needed to check on your health. Be sure to keep all your appointments. In some cases, you may need surgery to stop symptoms. Your healthcare provider will talk with you about this option if needed.  When to seek medical advice  Call your healthcare provider right away if any of these occur:    Severe pain in your chest or abdomen    Can t keep down food or liquid    Symptoms get worse    Other symptoms as indicated by your healthcare provider  Call 911  Call 911 if any of these occur:    Trouble breathing or swallowing    Worsening chest pain, especially if different from usual    Vomiting blood    Large amounts  of blood in stool  Date Last Reviewed: 6/24/2015 2000-2017 [employer]. [address] [place] [address], [place] [address]. All rights reserved. This information is not intended as a substitute for professional medical care. Always follow your healthcare professional's instructions.

## 2018-06-05 LAB — COPATH REPORT: NORMAL

## 2018-06-15 ENCOUNTER — OFFICE VISIT (OUTPATIENT)
Dept: FAMILY MEDICINE | Facility: CLINIC | Age: 58
End: 2018-06-15
Payer: COMMERCIAL

## 2018-06-15 VITALS
BODY MASS INDEX: 28.74 KG/M2 | SYSTOLIC BLOOD PRESSURE: 124 MMHG | RESPIRATION RATE: 12 BRPM | WEIGHT: 236 LBS | TEMPERATURE: 97.4 F | OXYGEN SATURATION: 98 % | DIASTOLIC BLOOD PRESSURE: 68 MMHG | HEART RATE: 84 BPM | HEIGHT: 76 IN

## 2018-06-15 DIAGNOSIS — G25.81 RLS (RESTLESS LEGS SYNDROME): ICD-10-CM

## 2018-06-15 DIAGNOSIS — K57.30 DIVERTICULOSIS OF LARGE INTESTINE WITHOUT HEMORRHAGE: ICD-10-CM

## 2018-06-15 DIAGNOSIS — E78.5 HYPERLIPIDEMIA LDL GOAL <130: ICD-10-CM

## 2018-06-15 DIAGNOSIS — Z12.5 SCREENING FOR PROSTATE CANCER: ICD-10-CM

## 2018-06-15 DIAGNOSIS — K21.9 GASTROESOPHAGEAL REFLUX DISEASE, ESOPHAGITIS PRESENCE NOT SPECIFIED: ICD-10-CM

## 2018-06-15 DIAGNOSIS — Z51.81 MEDICATION MONITORING ENCOUNTER: ICD-10-CM

## 2018-06-15 DIAGNOSIS — G47.33 OSA (OBSTRUCTIVE SLEEP APNEA): ICD-10-CM

## 2018-06-15 DIAGNOSIS — F51.01 PRIMARY INSOMNIA: ICD-10-CM

## 2018-06-15 DIAGNOSIS — Z00.00 ENCOUNTER FOR ROUTINE ADULT HEALTH EXAMINATION WITHOUT ABNORMAL FINDINGS: Primary | ICD-10-CM

## 2018-06-15 DIAGNOSIS — Z12.11 SCREEN FOR COLON CANCER: ICD-10-CM

## 2018-06-15 LAB
ALBUMIN UR-MCNC: NEGATIVE MG/DL
APPEARANCE UR: CLEAR
BILIRUB UR QL STRIP: NEGATIVE
COLOR UR AUTO: YELLOW
ERYTHROCYTE [DISTWIDTH] IN BLOOD BY AUTOMATED COUNT: 13.1 % (ref 10–15)
FERRITIN SERPL-MCNC: 140 NG/ML (ref 26–388)
GLUCOSE UR STRIP-MCNC: NEGATIVE MG/DL
HCT VFR BLD AUTO: 47.4 % (ref 40–53)
HGB BLD-MCNC: 16.4 G/DL (ref 13.3–17.7)
HGB UR QL STRIP: NEGATIVE
KETONES UR STRIP-MCNC: NEGATIVE MG/DL
LEUKOCYTE ESTERASE UR QL STRIP: NEGATIVE
MCH RBC QN AUTO: 29.7 PG (ref 26.5–33)
MCHC RBC AUTO-ENTMCNC: 34.6 G/DL (ref 31.5–36.5)
MCV RBC AUTO: 86 FL (ref 78–100)
NITRATE UR QL: NEGATIVE
PH UR STRIP: 5.5 PH (ref 5–7)
PLATELET # BLD AUTO: 167 10E9/L (ref 150–450)
PSA SERPL-ACNC: 1.09 UG/L (ref 0–4)
RBC # BLD AUTO: 5.52 10E12/L (ref 4.4–5.9)
SOURCE: NORMAL
SP GR UR STRIP: 1.02 (ref 1–1.03)
UROBILINOGEN UR STRIP-ACNC: 0.2 EU/DL (ref 0.2–1)
WBC # BLD AUTO: 7.3 10E9/L (ref 4–11)

## 2018-06-15 PROCEDURE — 85027 COMPLETE CBC AUTOMATED: CPT | Performed by: FAMILY MEDICINE

## 2018-06-15 PROCEDURE — 99396 PREV VISIT EST AGE 40-64: CPT | Performed by: FAMILY MEDICINE

## 2018-06-15 PROCEDURE — 82043 UR ALBUMIN QUANTITATIVE: CPT | Performed by: FAMILY MEDICINE

## 2018-06-15 PROCEDURE — 80061 LIPID PANEL: CPT | Performed by: FAMILY MEDICINE

## 2018-06-15 PROCEDURE — 81003 URINALYSIS AUTO W/O SCOPE: CPT | Performed by: FAMILY MEDICINE

## 2018-06-15 PROCEDURE — 82728 ASSAY OF FERRITIN: CPT | Performed by: FAMILY MEDICINE

## 2018-06-15 PROCEDURE — 84443 ASSAY THYROID STIM HORMONE: CPT | Performed by: FAMILY MEDICINE

## 2018-06-15 PROCEDURE — G0103 PSA SCREENING: HCPCS | Performed by: FAMILY MEDICINE

## 2018-06-15 PROCEDURE — 82550 ASSAY OF CK (CPK): CPT | Performed by: FAMILY MEDICINE

## 2018-06-15 PROCEDURE — 80053 COMPREHEN METABOLIC PANEL: CPT | Performed by: FAMILY MEDICINE

## 2018-06-15 PROCEDURE — 36415 COLL VENOUS BLD VENIPUNCTURE: CPT | Performed by: FAMILY MEDICINE

## 2018-06-15 RX ORDER — PANTOPRAZOLE SODIUM 40 MG/1
40 TABLET, DELAYED RELEASE ORAL DAILY
Qty: 90 TABLET | Refills: 3 | Status: SHIPPED | OUTPATIENT
Start: 2018-06-15 | End: 2019-08-05

## 2018-06-15 NOTE — PROGRESS NOTES
SUBJECTIVE:   CC: Gonzalez Beard is an 57 year old male who presents for preventative health visit.     Healthy Habits:    Do you get at least three servings of calcium containing foods daily (dairy, green leafy vegetables, etc.)? yes    Amount of exercise or daily activities, outside of work: no    Problems taking medications regularly No    Medication side effects: No    Have you had an eye exam in the past two years? no    Do you see a dentist twice per year? yes    Do you have sleep apnea, excessive snoring or daytime drowsiness? Yes/ Uses C-pap     LUIS E/RLS/Insomnia -- Well controlled with use of CPAP nightly. Followed by [facility] ([facility]).     Left Shoulder -- Pt was not able to follow up for surgery due to untreated sleep apnea - he will reschedule    Esophageal Reflux -- Improved with use of Protonix 40 mg daily. Had an endoscopy performed 6/4/18.     Lipids -- Aspirin 81 mg daily, Fish oil 1000 mg daily.     Recent Labs   Lab Test  06/28/17   1402  12/15/15   0832  03/13/13   1037  08/17/11   0904   CHOL  237*  218*  205*  233*   HDL  44  37*  30*  33*   LDL  146*  146*  128  133*   TRIG  233*  177*  236*  333*   CHOLHDLRATIO   --    --   6.9*  7.0*       Past/recent records reviewed and discussed for -- family hx, social hx, surgical hx, medications, immunizations, allergies.      Today's PHQ-2 Score:   PHQ-2 ( 1999 [employer]) 6/15/2018 12/7/2015   Q1: Little interest or pleasure in doing things 0 -   Q2: Feeling down, depressed or hopeless 0 -   PHQ-2 Score 0 -   Q1: Little interest or pleasure in doing things - Not at all   Q2: Feeling down, depressed or hopeless - Not at all   PHQ-2 Score - 0     Abuse: Current or Past(Physical, Sexual or Emotional)- No  Do you feel safe in your environment - Yes    Social History   Substance Use Topics     Smoking status: Current Every Day Smoker     Packs/day: 0.50     Years: 25.00     Types: Cigarettes     Smokeless tobacco: Former User     Quit date: 4/30/2010       Comment: quit 2010, 2 tins per week     Alcohol use 6.0 oz/week     10 Standard drinks or equivalent per week      Comment: 3 x week      If you drink alcohol do you typically have >3 drinks per day or >7 drinks per week? No                      Last PSA:   PSA   Date Value Ref Range Status   06/28/2017 1.31 0 - 4 ug/L Final     Comment:     Assay Method:  Chemiluminescence using Siemens Vista analyzer     Reviewed orders with patient. Reviewed health maintenance and updated orders accordingly - Yes    Reviewed and updated as needed this visit by clinical staff  Tobacco  Allergies  Meds  Med Hx  Surg Hx  Fam Hx  Soc Hx      Reviewed and updated as needed this visit by Provider  Allergies        Health Maintenance     Colonoscopy:  5 years, due 8/22 (last 8/17)   FIT:  Yearly, due (last 7/17)              PSA:  Yearly, due (last 6/17)   DEXA:  n/a    Health Maintenance Due   Topic Date Due     LIPID MONITORING Q1 YEAR  06/28/2018     PSA Q1 YR  06/28/2018     WELLNESS VISIT Q1 YR  06/28/2018     FIT Q1 YR  07/05/2018       Current Problem List    Patient Active Problem List   Diagnosis     Esophageal reflux     HYPERLIPIDEMIA LDL GOAL <130     Diverticulitis     Shingles     Advanced directives, counseling/discussion     Primary insomnia     LUIS E (obstructive sleep apnea)     RLS (restless legs syndrome)       Past Medical History    Past Medical History:   Diagnosis Date     Benign neoplasm of colon 05/2008    multiple with diverticulosis - adenomatous, hyperplastic     Diverticulitis of colon 9/07, 1/17     Esophageal reflux 2005     Hyperlipidemia LDL goal <130 1997     Hyperplastic colon polyp 7/09    due 5 yrs     Inguinal hernia with obstruction, without mention of gangrene, unilateral or unspecified, (not specified as recurrent) 9/19/07    right detected on exam      Insomnia, unspecified     sleep issues - Dr Leonard     LUIS E (obstructive sleep apnea) 2018    [facility] - CPAP - 10 cm     RLS (restless  legs syndrome) 2018     Shingles 4/14    rt face/ear     Stricture and stenosis of esophagus        Past Surgical History    Past Surgical History:   Procedure Laterality Date     COLECTOMY  09/2017    Dr Hayward, robotics assisted sigmoid colectomy     COLONOSCOPY  5/08, 7/09, 7/11, 8/17    multiple polyps with diverticulosis - due 5 yr     ESOPHAGOSCOPY, GASTROSCOPY, DUODENOSCOPY (EGD), COMBINED N/A 1/8/2016    stricture with dilation     ESOPHAGOSCOPY, GASTROSCOPY, DUODENOSCOPY (EGD), COMBINED N/A 6/4/2018    esophageal stenosis, small hiatal hernia, dilated     ESOPHAGOSCOPY, GASTROSCOPY, DUODENOSCOPY (EGD), DILATATION, COMBINED N/A 6/4/2018    Procedure: COMBINED ESOPHAGOSCOPY, GASTROSCOPY, DUODENOSCOPY (EGD), DILATATION;  COMBINED ESOPHAGOSCOPY, GASTROSCOPY, DUODENOSCOPY (EGD) with biopsies and esophageal dilatation with savary wire 17  ;  Surgeon: Reginald Gay MD;  Location: [facility] ESOPHAGOSCOPY, DIAGNOSTIC  5/07, 2/09, 6/18    reactive gastropathy - with strictures dilated x 2  = 5/07, 6/15/07, 6/18 (stricture x 1, small hiatl hernia)      stress echo  9/11    normal     UPPER GI ENDOSCOPY  6/15, 1/16, 6/18    stricture, food caught, [place] - 1/16 normal - 6/18 stricture x 1 dilated, small hiatal hernia       Current Medications    Current Outpatient Prescriptions   Medication Sig Dispense Refill     aspirin 81 MG tablet Take  by mouth daily.  3     fish oil-omega-3 fatty acids (OMEGA 3) 1000 MG capsule Take 1 capsule by mouth daily. 90 capsule 3     Multiple Vitamin (MULTIVITAMINS PO) Take 1 tablet by mouth daily        pantoprazole (PROTONIX) 40 MG EC tablet Take 1 tablet (40 mg) by mouth daily 90 tablet 3     Probiotic Product (PROBIOTIC DAILY PO) Take 1 capsule by mouth daily         Allergies    No Known Allergies    Immunizations    Immunization History   Administered Date(s) Administered     Influenza Vaccine IM 3yrs+ 4 Valent IIV4 12/08/2015     Pneumococcal 23 valent 06/15/2018      TD (ADULT, 7+) 2017     TDAP Vaccine (Adacel) 2007     Zoster vaccine recombinant adjuvanted (SHINGRIX) 06/15/2018       Family History    Family History   Problem Relation Age of Onset     C.A.D. Father 60      at age 86     Hypertension Father      CEREBROVASCULAR DISEASE Father 65     Prostate Cancer Father 65     Coronary Artery Disease Paternal Grandfather      Coronary Artery Disease Brother 60     bypass x 5 - CHF     DIABETES Brother 50     Colon Cancer Brother 63     Breast Cancer No family hx of        Social History    Social History     Social History     Marital status:      Spouse name: Sabrina     Number of children: 2     Years of education: 16     Occupational History           [employer]owns shop      [employer]     Social History Main Topics     Smoking status: Current Every Day Smoker     Packs/day: 0.50     Years: 25.00     Types: Cigarettes     Smokeless tobacco: Former User     Quit date: 2010      Comment: quit , 2 tins per week     Alcohol use 6.0 oz/week     10 Standard drinks or equivalent per week      Comment: 3 x week     Drug use: Yes      Comment: occ marijuana     Sexual activity: Yes     Partners: Female     Other Topics Concern     Caffeine Concern Yes     2 cups, <1 can qd     Exercise Yes     regularly exercising     Seat Belt Yes     Parent/Sibling W/ Cabg, Mi Or Angioplasty Before 65f 55m? No     Social History Narrative       ROS:  Constitutional, HEENT, cardiovascular, pulmonary, GI, , musculoskeletal, neuro, skin, endocrine and psych systems are negative, except as in HPI or otherwise noted     This document serves as a record of the services and decisions personally performed and made by Bonilla Guzmán MD [facility]. It was created on their behalf by Riccardo Terrell, a trained medical scribe. The creation of this document is based the provider's statements to the medical scribe.  Riccardo Terrell Jennifer 15, 2018 11:16 AM      OBJECTIVE:   /68 (BP Location:  "Left arm, Patient Position: Sitting, Cuff Size: Adult Large)  Pulse 84  Temp 97.4  F (36.3  C) (Tympanic)  Resp 12  Ht 6' 4\" (1.93 m)  Wt 236 lb (107 kg)  SpO2 98%  BMI 28.73 kg/m2  EXAM:  GENERAL: healthy, alert and no distress, overweight  HENT: ear canals and TM's normal upon viewing with otoscope, nose and mouth without ulcers or lesions upon viewing with otoscope  NECK: no adenopathy, no asymmetry, masses, or scars and thyroid normal to palpation  RESP: lungs clear to auscultation - no rales, rhonchi or wheezes  CV: regular rate and rhythm, normal S1 S2, no S3 or S4, no murmur, click or rub, no peripheral edema and peripheral pulses strong  ABDOMEN: soft, nontender, no hepatosplenomegaly, no masses and bowel sounds normal   (male): testicles normal without atrophy or masses, no hernias and penis normal without urethral discharge  RECTAL: normal sphincter tone, no rectal masses and prostate of normal size for age, smooth, nontender without masses/nodules  MS: no gross musculoskeletal defects noted, no edema  SKIN: no suspicious lesions or rashes to visible skin  NEURO: mentation intact and speech normal  PSYCH: mentation appears normal, affect normal    ASSESSMENT/PLAN:       ICD-10-CM    1. Encounter for routine adult health examination without abnormal findings Z00.00 Comprehensive metabolic panel     Lipid panel reflex to direct LDL Fasting     CK total     CBC with platelets     TSH with free T4 reflex     Prostate spec antigen screen     Albumin Random Urine Quantitative with Creat Ratio     *UA reflex to Microscopic and Culture ([place] [facility] (except [place] and [place])     Fecal colorectal cancer screen (FIT)     Ferritin     CANCELED: PCV13, IM (6+ WK) - [id number]   2. LUIS E (obstructive sleep apnea) G47.33 TSH with free T4 reflex     Ferritin   3. RLS (restless legs syndrome) G25.81 TSH with free T4 reflex     Ferritin   4. Primary insomnia F51.01    5. Diverticulosis of large " intestine without hemorrhage K57.30    6. Gastroesophageal reflux disease, esophagitis presence not specified K21.9 CBC with platelets     pantoprazole (PROTONIX) 40 MG EC tablet     CANCELED: HIV Screening   7. Hyperlipidemia LDL goal <130 E78.5 Comprehensive metabolic panel     Lipid panel reflex to direct LDL Fasting     CK total   8. Screen for colon cancer Z12.11 Fecal colorectal cancer screen (FIT)   9. Screening for prostate cancer Z12.5 Prostate spec antigen screen   10. Medication monitoring encounter Z51.81 Comprehensive metabolic panel     Lipid panel reflex to direct LDL Fasting     CK total     CBC with platelets     TSH with free T4 reflex     Albumin Random Urine Quantitative with Creat Ratio     *UA reflex to Microscopic and Culture ([facility] (except [place] and [place])     Ferritin     Discussed treatment/modality options, including risk and benefits, he desires advised alcohol consumption 1oz per day or less, advised aspirin 81 mg po daily, advised 1 multivitamin per day, advised calcium 3231-2474 mg/d and Vitamin D 800-1200 IU/d, advised dentist every 6 months, advised diet, exercise, and weight loss, advised opthalmologist every 1-2 years, advised self testicular exam q month, diet discussed, further diagnostic(s), further health care maintenance, further lab(s), medication refill(s), OTC meds and observation. All diagnosis above reviewed and noted above, otherwise stable.  See [facility] orders for further details.      Return in about 1 year (around 6/15/2019), or if symptoms worsen or fail to improve, for Lab Work, Physical Exam.    Health Maintenance Due   Topic Date Due     LIPID MONITORING Q1 YEAR  06/28/2018     PSA Q1 YR  06/28/2018     WELLNESS VISIT Q1 YR  06/28/2018     FIT Q1 YR  07/05/2018     COUNSELING:  Reviewed preventive health counseling, as reflected in patient instructions     reports that he has been smoking Cigarettes.  He has a 12.50 pack-year smoking  "history. He quit smokeless tobacco use about 8 years ago.  Tobacco Cessation Action Plan: Information offered: Patient not interested at this time  Self help information given to patient  Estimated body mass index is 28.73 kg/(m^2) as calculated from the following:    Height as of this encounter: 6' 4\" (1.93 m).    Weight as of this encounter: 236 lb (107 kg).   Weight management plan: Discussed healthy diet and exercise guidelines and patient will follow up in 12 months in clinic to re-evaluate.    Counseling Resources:  ATP IV Guidelines  Pooled Cohorts Equation Calculator  FRAX Risk Assessment  ICSI Preventive Guidelines  Dietary Guidelines for Americans, 2010  USDA's MyPlate  ASA Prophylaxis  Lung CA Screening    The information in this document, created by the medical scribe for me, accurately reflects the services I personally performed and the decisions made by me. I have reviewed and approved this document for accuracy.   Bonilla Guzmán MD FAAFP            Bonilla Guzmán MD, FAAFP    [address]  [address] [phone] Fax    "

## 2018-06-15 NOTE — MR AVS SNAPSHOT
After Visit Summary   6/15/2018    Gonzalez Beard    MRN: [id number]           Patient Information     Date Of Birth          1960        Visit Information        Provider Department      6/15/2018 10:40 AM Ramirez, Bonilla, MD [facility]        Today's Diagnoses     Encounter for routine adult health examination without abnormal findings    -  1    LUIS E (obstructive sleep apnea)        RLS (restless legs syndrome)        Primary insomnia        Diverticulosis of large intestine without hemorrhage        Gastroesophageal reflux disease, esophagitis presence not specified        Hyperlipidemia LDL goal <130        Screen for colon cancer        Screening for prostate cancer        Medication monitoring encounter          Care Instructions    Inquire with your insurance company about Shingrix (shingles vaccine), series of 2 injections    Check on coverage for Pneumovax 23 as well    QuitPlan.com or 3-100-CJYVVKM are useful resources for quitting tobacco, follow up with our clinic if you desire additional assistance       [facility]                        To reach your care team during and after hours:   [phone]  To reach our pharmacy:        [phone]    Clinic Hours                        Our clinic hours are:    Monday   7:30 am to 7:00 pm                  Tuesday through Friday 7:30 am to 5:00 pm                             Saturday   8:00 am to 12:00 pm      Sunday   Closed      Pharmacy Hours                        Our pharmacy hours are:    Monday   8:30 am to 7:00 pm       Tuesday to Friday  8:30 am to 6:00 pm                       Saturday    9:00 am to 1:00 pm              Sunday    Closed              There is also information available at our web site:  [email or web address]    If your provider ordered any lab tests and you do not receive the results within 10 business days, please call the clinic.    If you need a medication refill please contact your  pharmacy.  Please allow 2-3 business days for your refill to be completed.    Our clinic offers telephone visits and e visits.  Please ask one of your team members to explain more.      Use [employer] (secure email communication and access to your chart) to send your primary care provider a message or make an appointment. Ask someone on your Team how to sign up for [employer].  Immunizations                      Immunization History   Administered Date(s) Administered     Influenza Vaccine IM 3yrs+ 4 Valent IIV4 12/08/2015     TD (ADULT, 7+) 06/28/2017     TDAP Vaccine (Adacel) 05/01/2007        Health Maintenance                         Health Maintenance Due   Topic Date Due     HIV SCREEN (SYSTEM ASSIGNED)  07/14/1979     Cholesterol Lab - yearly  06/28/2018     Prostate Test (PSA) - yearly  06/28/2018     Wellness Visit with your Primary Provider - yearly  06/28/2018     Colon Cancer Screening - FIT Test - yearly  07/05/2018         Preventive Health Recommendations  Male Ages 50 - 64    Yearly exam:             See your health care provider every year in order to  o   Review health changes.   o   Discuss preventive care.    o   Review your medicines if your doctor has prescribed any.     Have a cholesterol test every 5 years, or more frequently if you are at risk for high cholesterol/heart disease.     Have a diabetes test (fasting glucose) every three years. If you are at risk for diabetes, you should have this test more often.     Have a colonoscopy at age 50, or have a yearly FIT test (stool test). These exams will check for colon cancer.      Talk with your health care provider about whether or not a prostate cancer screening test (PSA) is right for you.    You should be tested each year for STDs (sexually transmitted diseases), if you re at risk.     Shots: Get a flu shot each year. Get a tetanus shot every 10 years.     Nutrition:    Eat at least 5 servings of fruits and vegetables daily.     Eat whole-grain  bread, whole-wheat pasta and brown rice instead of white grains and rice.     Talk to your provider about Calcium and Vitamin D.     Lifestyle    Exercise for at least 150 minutes a week (30 minutes a day, 5 days a week). This will help you control your weight and prevent disease.     Limit alcohol to one drink per day.     No smoking.     Wear sunscreen to prevent skin cancer.     See your dentist every six months for an exam and cleaning.     See your eye doctor every 1 to 2 years.            Follow-ups after your visit        Follow-up notes from your care team     Return in about 1 year (around 6/15/2019), or if symptoms worsen or fail to improve, for Lab Work, Physical Exam.      Future tests that were ordered for you today     Open Future Orders        Priority Expected Expires Ordered    Fecal colorectal cancer screen (FIT) Routine 7/6/2018 9/7/2018 6/15/2018            Who to contact     If you have questions or need follow up information about today's clinic visit or your schedule please contact [facility] directly at [phone].  Normal or non-critical lab and imaging results will be communicated to you by [employer], letter or phone within 4 business days after the clinic has received the results. If you do not hear from us within 7 days, please contact the clinic through ER[employer] or phone. If you have a critical or abnormal lab result, we will notify you by phone as soon as possible.  Submit refill requests through [employer] or call your pharmacy and they will forward the refill request to us. Please allow 3 business days for your refill to be completed.          Additional Information About Your Visit        [employer] Information     [employer] gives you secure access to your electronic health record. If you see a primary care provider, you can also send messages to your care team and make appointments. If you have questions, please call your primary care clinic.  If you do not have a primary  "care provider, please call [phone] and they will assist you.        Care EveryWhere ID     This is your Care EveryWhere ID. This could be used by other organizations to access your [facility] medical records  [id number]        Your Vitals Were     Pulse Temperature Respirations Height Pulse Oximetry BMI (Body Mass Index)    84 97.4  F (36.3  C) (Tympanic) 12 6' 4\" (1.93 m) 98% 28.73 kg/m2       Blood Pressure from Last 3 Encounters:   06/15/18 124/68   06/04/18 134/82   05/14/18 124/68    Weight from Last 3 Encounters:   06/15/18 236 lb (107 kg)   05/14/18 236 lb (107 kg)   09/27/17 217 lb (98.4 kg)              We Performed the Following     *UA reflex to Microscopic and Culture ([place] [facility] (except [place] and [place])     Albumin Random Urine Quantitative with Creat Ratio     CBC with platelets     CK total     Comprehensive metabolic panel     Ferritin     Lipid panel reflex to direct LDL Fasting     Prostate spec antigen screen     TSH with free T4 reflex          Where to get your medicines      These medications were sent to [facility] [place] [address]     Phone:  [phone]     pantoprazole 40 MG EC tablet          Primary Care Provider Office Phone # Fax #    Bonilla Guzmán [phone]       [address] [place] [address]        Equal Access to Services     COREY WHITE AH: Hadii pallavi ku hadasho Soomaali, waaxda luqadaha, qaybta kaalmada delonyaricha, chelita galindo. So [facility] [phone].    ATENCIÓN: Si habla español, tiene a perales disposición servicios gratuitos de asistencia lingüística. Llame al [phone].    We comply with applicable federal civil rights laws and [place] laws. We do not discriminate on the basis of race, color, national origin, age, disability, sex, sexual orientation, or gender identity.            Thank you!     Thank " you for choosing [facility]  for your care. Our goal is always to provide you with excellent care. Hearing back from our patients is one way we can continue to improve our services. Please take a few minutes to complete the written survey that you may receive in the mail after your visit with us. Thank you!             Your Updated Medication List - Protect others around you: Learn how to safely use, store and throw away your medicines at www.disposemymeds.org.          This list is accurate as of 6/15/18 11:33 AM.  Always use your most recent med list.                   Brand Name Dispense Instructions for use Diagnosis    aspirin 81 MG tablet      Take  by mouth daily.        fish oil-omega-3 fatty acids 1000 MG capsule     90 capsule    Take 1 capsule by mouth daily.        MULTIVITAMINS PO      Take 1 tablet by mouth daily        pantoprazole 40 MG EC tablet    PROTONIX    90 tablet    Take 1 tablet (40 mg) by mouth daily    Gastroesophageal reflux disease, esophagitis presence not specified       PROBIOTIC DAILY PO      Take 1 capsule by mouth daily

## 2018-06-15 NOTE — PATIENT INSTRUCTIONS
Inquire with your insurance company about Shingrix (shingles vaccine), series of 2 injections    Check on coverage for Pneumovax 23 as well    [employer] or 3-539-CPWFIXN are useful resources for quitting tobacco, follow up with our clinic if you desire additional assistance       [facility] [place]                        To reach your care team during and after hours:   [phone]  To reach our pharmacy:        [phone]    Clinic Hours                        Our clinic hours are:    Monday   7:30 am to 7:00 pm                  Tuesday through Friday 7:30 am to 5:00 pm                             Saturday   8:00 am to 12:00 pm      Sunday   Closed      Pharmacy Hours                        Our pharmacy hours are:    Monday   8:30 am to 7:00 pm       Tuesday to Friday  8:30 am to 6:00 pm                       Saturday    9:00 am to 1:00 pm              Sunday    Closed              There is also information available at our web site:  [email or web address]    If your provider ordered any lab tests and you do not receive the results within 10 business days, please call the clinic.    If you need a medication refill please contact your pharmacy.  Please allow 2-3 business days for your refill to be completed.    Our clinic offers telephone visits and e visits.  Please ask one of your team members to explain more.      Use [email or web address] (secure email communication and access to your chart) to send your primary care provider a message or make an appointment. Ask someone on your Team how to sign up for [employer].  Immunizations                      Immunization History   Administered Date(s) Administered     Influenza Vaccine IM 3yrs+ 4 Valent IIV4 12/08/2015     Pneumococcal 23 valent 06/15/2018     TD (ADULT, 7+) 06/28/2017     TDAP Vaccine (Adacel) 05/01/2007     Zoster vaccine recombinant adjuvanted (SHINGRIX) 06/15/2018        Health Maintenance                         Health Maintenance Due   Topic Date Due      HIV SCREEN (SYSTEM ASSIGNED)  07/14/1979     Cholesterol Lab - yearly  06/28/2018     Prostate Test (PSA) - yearly  06/28/2018     Wellness Visit with your Primary Provider - yearly  06/28/2018     Colon Cancer Screening - FIT Test - yearly  07/05/2018         Preventive Health Recommendations  Male Ages 50   64    Yearly exam:             See your health care provider every year in order to  o   Review health changes.   o   Discuss preventive care.    o   Review your medicines if your doctor has prescribed any.     Have a cholesterol test every 5 years, or more frequently if you are at risk for high cholesterol/heart disease.     Have a diabetes test (fasting glucose) every three years. If you are at risk for diabetes, you should have this test more often.     Have a colonoscopy at age 50, or have a yearly FIT test (stool test). These exams will check for colon cancer.      Talk with your health care provider about whether or not a prostate cancer screening test (PSA) is right for you.    You should be tested each year for STDs (sexually transmitted diseases), if you re at risk.     Shots: Get a flu shot each year. Get a tetanus shot every 10 years.     Nutrition:    Eat at least 5 servings of fruits and vegetables daily.     Eat whole-grain bread, whole-wheat pasta and brown rice instead of white grains and rice.     Talk to your provider about Calcium and Vitamin D.     Lifestyle    Exercise for at least 150 minutes a week (30 minutes a day, 5 days a week). This will help you control your weight and prevent disease.     Limit alcohol to one drink per day.     No smoking.     Wear sunscreen to prevent skin cancer.     See your dentist every six months for an exam and cleaning.     See your eye doctor every 1 to 2 years.

## 2018-06-16 LAB
ALBUMIN SERPL-MCNC: 4.1 G/DL (ref 3.4–5)
ALP SERPL-CCNC: 115 U/L (ref 40–150)
ALT SERPL W P-5'-P-CCNC: 42 U/L (ref 0–70)
ANION GAP SERPL CALCULATED.3IONS-SCNC: 9 MMOL/L (ref 3–14)
AST SERPL W P-5'-P-CCNC: 26 U/L (ref 0–45)
BILIRUB SERPL-MCNC: 0.4 MG/DL (ref 0.2–1.3)
BUN SERPL-MCNC: 15 MG/DL (ref 7–30)
CALCIUM SERPL-MCNC: 9.3 MG/DL (ref 8.5–10.1)
CHLORIDE SERPL-SCNC: 104 MMOL/L (ref 94–109)
CHOLEST SERPL-MCNC: 225 MG/DL
CK SERPL-CCNC: 201 U/L (ref 30–300)
CO2 SERPL-SCNC: 26 MMOL/L (ref 20–32)
CREAT SERPL-MCNC: 0.97 MG/DL (ref 0.66–1.25)
CREAT UR-MCNC: 125 MG/DL
GFR SERPL CREATININE-BSD FRML MDRD: 80 ML/MIN/1.7M2
GLUCOSE SERPL-MCNC: 92 MG/DL (ref 70–99)
HDLC SERPL-MCNC: 35 MG/DL
LDLC SERPL CALC-MCNC: 152 MG/DL
MICROALBUMIN UR-MCNC: 7 MG/L
MICROALBUMIN/CREAT UR: 5.34 MG/G CR (ref 0–17)
NONHDLC SERPL-MCNC: 190 MG/DL
POTASSIUM SERPL-SCNC: 4.3 MMOL/L (ref 3.4–5.3)
PROT SERPL-MCNC: 7.7 G/DL (ref 6.8–8.8)
SODIUM SERPL-SCNC: 139 MMOL/L (ref 133–144)
TRIGL SERPL-MCNC: 189 MG/DL
TSH SERPL DL<=0.005 MIU/L-ACNC: 1.18 MU/L (ref 0.4–4)

## 2018-06-20 DIAGNOSIS — E78.5 HYPERLIPIDEMIA LDL GOAL <130: Primary | ICD-10-CM

## 2018-07-04 PROCEDURE — 82274 ASSAY TEST FOR BLOOD FECAL: CPT | Performed by: FAMILY MEDICINE

## 2018-07-07 DIAGNOSIS — Z12.11 SCREEN FOR COLON CANCER: ICD-10-CM

## 2018-07-07 DIAGNOSIS — Z00.00 ENCOUNTER FOR ROUTINE ADULT HEALTH EXAMINATION WITHOUT ABNORMAL FINDINGS: ICD-10-CM

## 2018-07-08 LAB — HEMOCCULT STL QL IA: NEGATIVE

## 2019-02-04 ENCOUNTER — ALLIED HEALTH/NURSE VISIT (OUTPATIENT)
Dept: NURSING | Facility: CLINIC | Age: 59
End: 2019-02-04
Payer: COMMERCIAL

## 2019-02-04 DIAGNOSIS — Z23 ENCOUNTER FOR IMMUNIZATION: Primary | ICD-10-CM

## 2019-02-04 PROCEDURE — 90471 IMMUNIZATION ADMIN: CPT

## 2019-02-04 PROCEDURE — 90750 HZV VACC RECOMBINANT IM: CPT

## 2019-05-16 ENCOUNTER — TRANSFERRED RECORDS (OUTPATIENT)
Dept: HEALTH INFORMATION MANAGEMENT | Facility: CLINIC | Age: 59
End: 2019-05-16

## 2019-05-16 ENCOUNTER — HOSPITAL ENCOUNTER (OUTPATIENT)
Dept: LAB | Facility: CLINIC | Age: 59
Discharge: HOME | End: 2019-05-16
Attending: INTERNAL MEDICINE | Admitting: INTERNAL MEDICINE
Payer: COMMERCIAL

## 2019-05-16 DIAGNOSIS — G47.61 PERIODIC LIMB MOVEMENT DISORDER: Primary | ICD-10-CM

## 2019-05-16 LAB
FERRITIN SERPL-MCNC: 162 NG/ML (ref 26–388)
FOLATE SERPL-MCNC: 20.7 NG/ML
VIT B12 SERPL-MCNC: 615 PG/ML (ref 193–986)

## 2019-05-16 PROCEDURE — 36415 COLL VENOUS BLD VENIPUNCTURE: CPT | Performed by: INTERNAL MEDICINE

## 2019-05-16 PROCEDURE — 82746 ASSAY OF FOLIC ACID SERUM: CPT | Performed by: INTERNAL MEDICINE

## 2019-05-16 PROCEDURE — 82728 ASSAY OF FERRITIN: CPT | Performed by: INTERNAL MEDICINE

## 2019-05-16 PROCEDURE — 82607 VITAMIN B-12: CPT | Performed by: INTERNAL MEDICINE

## 2019-07-18 ENCOUNTER — TRANSFERRED RECORDS (OUTPATIENT)
Dept: HEALTH INFORMATION MANAGEMENT | Facility: CLINIC | Age: 59
End: 2019-07-18

## 2019-08-05 DIAGNOSIS — K21.9 GASTROESOPHAGEAL REFLUX DISEASE, ESOPHAGITIS PRESENCE NOT SPECIFIED: ICD-10-CM

## 2019-08-05 RX ORDER — PANTOPRAZOLE SODIUM 40 MG/1
TABLET, DELAYED RELEASE ORAL
Qty: 30 TABLET | Refills: 0 | Status: SHIPPED | OUTPATIENT
Start: 2019-08-05 | End: 2019-09-20

## 2019-08-05 NOTE — TELEPHONE ENCOUNTER
No future appt scheduled  Medication is being filled for 1 time refill only due to:  Patient needs to be seen because it has been more than one year since last visit.    Diane Woodard RN  [facility]

## 2019-08-05 NOTE — TELEPHONE ENCOUNTER
"Requested Prescriptions   Pending Prescriptions Disp Refills     pantoprazole (PROTONIX) 40 MG EC tablet [Pharmacy Med Name:  Last Written Prescription Date:  6/15/18  Last Fill Quantity: 90,  # refills: 3   Last Office Visit: 6/15/2018   Future Office Visit:      PANTOPRAZOLE SODIUM 40MG TBEC] 90 tablet 3     Sig: TAKE ONE TABLET BY MOUTH EVERY DAY       PPI Protocol Failed - 8/5/2019  7:30 AM        Failed - Recent (12 mo) or future (30 days) visit within the authorizing provider's specialty     Patient had office visit in the last 12 months or has a visit in the next 30 days with authorizing provider or within the authorizing provider's specialty.  See \"Patient Info\" tab in inbasket, or \"Choose Columns\" in Meds & Orders section of the refill encounter.          Passed - Not on Clopidogrel (unless Pantoprazole ordered)        Passed - No diagnosis of osteoporosis on record        Passed - Medication is active on med list        Passed - Patient is age 18 or older          "

## 2019-09-17 DIAGNOSIS — K21.9 GASTROESOPHAGEAL REFLUX DISEASE, ESOPHAGITIS PRESENCE NOT SPECIFIED: ICD-10-CM

## 2019-09-17 RX ORDER — PANTOPRAZOLE SODIUM 40 MG/1
TABLET, DELAYED RELEASE ORAL
Qty: 30 TABLET | Refills: 0 | OUTPATIENT
Start: 2019-09-17

## 2019-09-17 NOTE — TELEPHONE ENCOUNTER
"Requested Prescriptions   Pending Prescriptions Disp Refills     pantoprazole (PROTONIX) 40 MG EC tablet [Pharmacy Med Name: PANTOPRAZOLE SODIUM 40MG TBEC] 30 tablet 0     Sig: TAKE ONE TABLET BY MOUTH ONCE DAILY       PPI Protocol Failed - 9/17/2019  9:49 AM        Failed - Recent (12 mo) or future (30 days) visit within the authorizing provider's specialty     Patient had office visit in the last 12 months or has a visit in the next 30 days with authorizing provider or within the authorizing provider's specialty.  See \"Patient Info\" tab in inbasket, or \"Choose Columns\" in Meds & Orders section of the refill encounter.    LOV 6/15/2018    No future appt set            Passed - Not on Clopidogrel (unless Pantoprazole ordered)        Passed - No diagnosis of osteoporosis on record        Passed - Medication is active on med list        Passed - Patient is age 18 or older        Patient already given 1x miracle refill and did not follow up (due for Office visit) - no future appointment scheduled    Rx denied. Pharmacy notified    Routing to response pool to call/schedule    Attempt # 1  Called #   Telephone Information:   Mobile [phone]       Left a non detailed VM to call back at [phone] and ask for any available Triage Nurse.    Chuckie Garza RN   [facility]    "

## 2019-09-20 RX ORDER — PANTOPRAZOLE SODIUM 40 MG/1
40 TABLET, DELAYED RELEASE ORAL DAILY
Qty: 30 TABLET | Refills: 0 | Status: SHIPPED | OUTPATIENT
Start: 2019-09-20 | End: 2019-10-11

## 2019-09-20 NOTE — TELEPHONE ENCOUNTER
Called patient @ [phone] -     Advised of need for OV - Patient stated an understanding and agreed with plan.  OV scheduled for:   Next 5 appointments (look out 90 days)    Oct 11, 2019  9:40 AM CDT  [facility] [address] [place] [id number]  [phone]        Medication is being filled for 1 time refill only due to:  Patient needs to be seen because it has been more than one year since last visit.    Diane Woodard RN  [facility]

## 2019-10-11 ENCOUNTER — OFFICE VISIT (OUTPATIENT)
Dept: FAMILY MEDICINE | Facility: CLINIC | Age: 59
End: 2019-10-11
Payer: COMMERCIAL

## 2019-10-11 VITALS
BODY MASS INDEX: 28.13 KG/M2 | OXYGEN SATURATION: 94 % | HEIGHT: 76 IN | SYSTOLIC BLOOD PRESSURE: 120 MMHG | TEMPERATURE: 96.2 F | DIASTOLIC BLOOD PRESSURE: 82 MMHG | HEART RATE: 62 BPM | WEIGHT: 231 LBS

## 2019-10-11 DIAGNOSIS — Z00.00 ENCOUNTER FOR ROUTINE ADULT HEALTH EXAMINATION WITHOUT ABNORMAL FINDINGS: Primary | ICD-10-CM

## 2019-10-11 DIAGNOSIS — N52.9 ERECTILE DYSFUNCTION, UNSPECIFIED ERECTILE DYSFUNCTION TYPE: ICD-10-CM

## 2019-10-11 DIAGNOSIS — Z72.0 TOBACCO ABUSE: ICD-10-CM

## 2019-10-11 DIAGNOSIS — Z12.5 SCREENING FOR PROSTATE CANCER: ICD-10-CM

## 2019-10-11 DIAGNOSIS — G25.81 RLS (RESTLESS LEGS SYNDROME): ICD-10-CM

## 2019-10-11 DIAGNOSIS — Z23 NEED FOR PROPHYLACTIC VACCINATION AND INOCULATION AGAINST INFLUENZA: ICD-10-CM

## 2019-10-11 DIAGNOSIS — G47.33 OSA (OBSTRUCTIVE SLEEP APNEA): ICD-10-CM

## 2019-10-11 DIAGNOSIS — K21.9 GASTROESOPHAGEAL REFLUX DISEASE, ESOPHAGITIS PRESENCE NOT SPECIFIED: ICD-10-CM

## 2019-10-11 DIAGNOSIS — F51.01 PRIMARY INSOMNIA: ICD-10-CM

## 2019-10-11 DIAGNOSIS — Z71.6 TOBACCO ABUSE COUNSELING: ICD-10-CM

## 2019-10-11 DIAGNOSIS — Z12.11 SCREEN FOR COLON CANCER: ICD-10-CM

## 2019-10-11 DIAGNOSIS — E78.5 HYPERLIPIDEMIA LDL GOAL <100: ICD-10-CM

## 2019-10-11 LAB
ALBUMIN SERPL-MCNC: 3.6 G/DL (ref 3.4–5)
ALP SERPL-CCNC: 102 U/L (ref 40–150)
ALT SERPL W P-5'-P-CCNC: 32 U/L (ref 0–70)
ANION GAP SERPL CALCULATED.3IONS-SCNC: 6 MMOL/L (ref 3–14)
AST SERPL W P-5'-P-CCNC: 17 U/L (ref 0–45)
BILIRUB SERPL-MCNC: 0.4 MG/DL (ref 0.2–1.3)
BUN SERPL-MCNC: 15 MG/DL (ref 7–30)
CALCIUM SERPL-MCNC: 8.8 MG/DL (ref 8.5–10.1)
CHLORIDE SERPL-SCNC: 106 MMOL/L (ref 94–109)
CHOLEST SERPL-MCNC: 203 MG/DL
CO2 SERPL-SCNC: 27 MMOL/L (ref 20–32)
CREAT SERPL-MCNC: 0.86 MG/DL (ref 0.66–1.25)
ERYTHROCYTE [DISTWIDTH] IN BLOOD BY AUTOMATED COUNT: 12.6 % (ref 10–15)
GFR SERPL CREATININE-BSD FRML MDRD: >90 ML/MIN/{1.73_M2}
GLUCOSE SERPL-MCNC: 90 MG/DL (ref 70–99)
HCT VFR BLD AUTO: 46.2 % (ref 40–53)
HDLC SERPL-MCNC: 33 MG/DL
HGB BLD-MCNC: 16.1 G/DL (ref 13.3–17.7)
LDLC SERPL CALC-MCNC: 136 MG/DL
MCH RBC QN AUTO: 29.8 PG (ref 26.5–33)
MCHC RBC AUTO-ENTMCNC: 34.8 G/DL (ref 31.5–36.5)
MCV RBC AUTO: 86 FL (ref 78–100)
NONHDLC SERPL-MCNC: 170 MG/DL
PLATELET # BLD AUTO: 175 10E9/L (ref 150–450)
POTASSIUM SERPL-SCNC: 3.9 MMOL/L (ref 3.4–5.3)
PROT SERPL-MCNC: 6.8 G/DL (ref 6.8–8.8)
PSA SERPL-ACNC: 1.26 UG/L (ref 0–4)
RBC # BLD AUTO: 5.4 10E12/L (ref 4.4–5.9)
SODIUM SERPL-SCNC: 139 MMOL/L (ref 133–144)
TRIGL SERPL-MCNC: 170 MG/DL
WBC # BLD AUTO: 6.4 10E9/L (ref 4–11)

## 2019-10-11 PROCEDURE — G0103 PSA SCREENING: HCPCS | Performed by: FAMILY MEDICINE

## 2019-10-11 PROCEDURE — 80061 LIPID PANEL: CPT | Performed by: FAMILY MEDICINE

## 2019-10-11 PROCEDURE — 99396 PREV VISIT EST AGE 40-64: CPT | Mod: 25 | Performed by: FAMILY MEDICINE

## 2019-10-11 PROCEDURE — 90471 IMMUNIZATION ADMIN: CPT | Performed by: FAMILY MEDICINE

## 2019-10-11 PROCEDURE — 90682 RIV4 VACC RECOMBINANT DNA IM: CPT | Performed by: FAMILY MEDICINE

## 2019-10-11 PROCEDURE — 36415 COLL VENOUS BLD VENIPUNCTURE: CPT | Performed by: FAMILY MEDICINE

## 2019-10-11 PROCEDURE — 80053 COMPREHEN METABOLIC PANEL: CPT | Performed by: FAMILY MEDICINE

## 2019-10-11 PROCEDURE — 85027 COMPLETE CBC AUTOMATED: CPT | Performed by: FAMILY MEDICINE

## 2019-10-11 RX ORDER — PANTOPRAZOLE SODIUM 40 MG/1
40 TABLET, DELAYED RELEASE ORAL DAILY
Qty: 30 TABLET | Refills: 0 | Status: SHIPPED | OUTPATIENT
Start: 2019-10-11 | End: 2019-11-23

## 2019-10-11 RX ORDER — SILDENAFIL 50 MG/1
50-100 TABLET, FILM COATED ORAL DAILY PRN
Qty: 30 TABLET | Refills: 11 | Status: SHIPPED | OUTPATIENT
Start: 2019-10-11 | End: 2020-12-14

## 2019-10-11 ASSESSMENT — MIFFLIN-ST. JEOR: SCORE: 1969.31

## 2019-10-11 NOTE — PATIENT INSTRUCTIONS
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
## 2019-10-14 ENCOUNTER — TELEPHONE (OUTPATIENT)
Dept: FAMILY MEDICINE | Facility: CLINIC | Age: 59
End: 2019-10-14

## 2019-10-14 RX ORDER — SIMVASTATIN 20 MG
20 TABLET ORAL AT BEDTIME
Qty: 90 TABLET | Refills: 3 | Status: SHIPPED | OUTPATIENT
Start: 2019-10-14 | End: 2020-12-14

## 2019-10-14 NOTE — TELEPHONE ENCOUNTER
Reason for Call:  Other results    Detailed comments: Patient returning phone call for results, please try him again. Patient states that he did review the result notes on MyChart.    Phone Number Patient can be reached at: Cell number on file:    Telephone Information:   Mobile [phone]       Best Time: anytime    Can we leave a detailed message on this number? NO    Call taken on 10/14/2019 at 1:37 PM by Araceli MARTINEZ

## 2019-10-14 NOTE — RESULT ENCOUNTER NOTE
Please call him about starting simvastatin due to his heart disease risk - stopping smoking will also help lower his heart disease risk.     Here is a summary of your recent test results:  -Normal red blood cell (hgb) levels, normal white blood cell count and normal platelet levels.  -PSA (prostate specific antigen) test is normal.  This indicates a low likelihood of prostate cancer.  ADVISE: rechecking this in 1 year.  -LDL(bad) cholesterol level is elevated, HDL(good) cholesterol level is low and your triglycerides are elevated which can increase your heart disease risk.  A diet high in fat and simple carbohydrates, genetics and being overweight can contribute to this. ADVISE: exercising 150 minutes of aerobic exercise per week (30 minutes for 5 days per week or 50 minutes for 3 days per week are options), eating a low saturated fat/low carbohydrate diet, and omega-3 fatty acids (fish oil) 5669-1108 mg daily are helpful to improve this. Current guidelines from the American Heart Association support starting a cholesterol lowering medication to lower your heart and stroke disease risk.  I am sending a prescription to your pharmacy: simvastatin 20 mg each evening.  You can call your pharmacy to let them know you would like your prescription filled and if you have concerns about this recommendation then please contact me. In 3 months, you should recheck your fasting cholesterol panel by scheduling a  lab-only appointment.  -15.3% of patients that have a similar profile to you will have a heart attack within the next 10 years and that is considered a high risk and cholesterol lowering medications are recommended at this time. (high risk is >10%, or >7.5% if other risk factors such has high blood pressure or other heart disease risk factors)    The 10-year ASCVD risk score (Seth HARRIS Jr., et al., 2013) is: 15.3%    Values used to calculate the score:      Age: 59 years      Sex: Male      Is Non-   American: No      Diabetic: No      Tobacco smoker: Yes      Systolic Blood Pressure: 120 mmHg      Is BP treated: No      HDL Cholesterol: 33 mg/dL      Total Cholesterol: 203 mg/dL   -Liver and gallbladder tests are normal (ALT,AST, Alk phos, bilirubin), kidney function is normal (Cr, GFR), sodium is normal, potassium is normal, calcium is normal, glucose is normal.    For additional lab test information, labtestsonline.org is an excellent reference.           Thank you very much for trusting me and [facility].     Healthy regards,  Oracio Yuen MD

## 2019-11-23 DIAGNOSIS — K21.9 GASTROESOPHAGEAL REFLUX DISEASE, ESOPHAGITIS PRESENCE NOT SPECIFIED: ICD-10-CM

## 2019-11-25 RX ORDER — PANTOPRAZOLE SODIUM 40 MG/1
TABLET, DELAYED RELEASE ORAL
Qty: 30 TABLET | Refills: 10 | Status: SHIPPED | OUTPATIENT
Start: 2019-11-25 | End: 2020-11-17

## 2019-11-25 NOTE — TELEPHONE ENCOUNTER
"Requested Prescriptions   Pending Prescriptions Disp Refills     pantoprazole (PROTONIX) 40 MG EC tablet [Pharmacy Med Name: PANTOPRAZOLE SODIUM 40MG TBEC]          Last Written Prescription Date:  10.11.19  Last Fill Quantity: 30 tablet,  # refills: 0   Last office visit: 10/11/2019 with prescribing provider:  Eulogio Yuen MD             Future Office Visit:       30 tablet 0     Sig: TAKE ONE TABLET BY MOUTH ONCE DAILY       PPI Protocol Passed - 11/23/2019  9:19 PM        Passed - Not on Clopidogrel (unless Pantoprazole ordered)        Passed - No diagnosis of osteoporosis on record        Passed - Recent (12 mo) or future (30 days) visit within the authorizing provider's specialty     Patient has had an office visit with the authorizing provider or a provider within the authorizing providers department within the previous 12 mos or has a future within next 30 days. See \"Patient Info\" tab in inbasket, or \"Choose Columns\" in Meds & Orders section of the refill encounter.              Passed - Medication is active on med list        Passed - Patient is age 18 or older        "

## 2020-11-22 ENCOUNTER — HEALTH MAINTENANCE LETTER (OUTPATIENT)
Age: 60
End: 2020-11-22

## 2020-12-11 NOTE — PROGRESS NOTES
SUBJECTIVE:   CC: Gonzalez Beard is an 60 year old male who presents for preventive health visit.     Healthy Habits:     Getting at least 3 servings of Calcium per day:  NO    Bi-annual eye exam:  Yes    Dental care twice a year:  Yes    Sleep apnea or symptoms of sleep apnea:  Daytime drowsiness and Sleep apnea    Diet:  Regular (no restrictions)    Frequency of exercise:  None    Taking medications regularly:  Yes    Medication side effects:  None    PHQ-2 Total Score: 0    Additional concerns today:  No      Hyperlipidemia Follow-Up      Are you regularly taking any medication or supplement to lower your cholesterol?   Patient has not started taking Simvastatin    Are you having muscle aches or other side effects that you think could be caused by your cholesterol lowering medication?  No  -15.9% of patients that have a similar profile to you will have a stroke, heart attack or death (related to heart disease) within the next 10 years and that is considered a high risk and cholesterol lowering medications are recommended at this time (high risk is >10%, or >7.5% if other risk factors such has high blood pressure or other heart disease risk factors).    The ASCVD risk score (Seth HARRIS Jr et al., 2013) returns the percentage likelihood of a first time Atherosclerotic Cardiovascular Disease (ASCVD) event.    The 10-year ASCVD risk score (Seth HARRIS Jr., et al., 2013) is: 16.2%    Values used to calculate the score:      Age: 60 years      Sex: Male      Is Non- : No      Diabetic: No      Tobacco smoker: Yes      Systolic Blood Pressure: 120 mmHg      Is BP treated: No      HDL Cholesterol: 34 mg/dL      Total Cholesterol: 212 mg/dL         Today's PHQ-2 Score:   PHQ-2 ( 1999 Pfizer) 12/14/2020 12/13/2020   Q1: Little interest or pleasure in doing things 0 0   Q2: Feeling down, depressed or hopeless 0 0   PHQ-2 Score 0 0   Q1: Little interest or pleasure in doing things - Not at all   Q2: Feeling  "down, depressed or hopeless - Not at all   PHQ-2 Score - 0       Abuse: Current or Past(Physical, Sexual or Emotional)- No  Do you feel safe in your environment? Yes        Social History     Tobacco Use     Smoking status: Current Every Day Smoker     Packs/day: 0.50     Years: 25.00     Pack years: 12.50     Types: Cigarettes     Smokeless tobacco: Former User     Quit date: 4/30/2010     Tobacco comment: quit 2010- restarted 2/2018, 2 tins per week   Substance Use Topics     Alcohol use: Yes     Alcohol/week: 10.0 standard drinks     Types: 10 Standard drinks or equivalent per week     Comment: 3 x week     If you drink alcohol do you typically have >3 drinks per day or >7 drinks per week? No                      Last PSA:   PSA   Date Value Ref Range Status   12/14/2020 1.39 0 - 4 ug/L Final     Comment:     Assay Method:  Chemiluminescence using Siemens Vista analyzer       Reviewed orders with patient. Reviewed health maintenance and updated orders accordingly - Yes  Reviewed and updated as needed this visit by clinical staff  Tobacco  Allergies  Meds  Problems  Med Hx  Surg Hx  Fam Hx  Soc Hx          Reviewed and updated as needed this visit by Provider  Tobacco  Allergies  Meds  Problems  Med Hx  Surg Hx  Fam Hx           ROS:  Constitutional, HEENT, cardiovascular, pulmonary, GI, , musculoskeletal, neuro, skin, endocrine and psych systems are negative, except as otherwise noted.  OBJECTIVE:   /76   Pulse 78   Temp 98.2  F (36.8  C) (Tympanic)   Ht 1.854 m (6' 1\")   Wt 106.1 kg (234 lb)   SpO2 99%   BMI 30.87 kg/m    EXAM:  GENERAL: healthy, alert and no distress  EYES: Eyes grossly normal to inspection, PERRL and conjunctivae and sclerae normal  HENT: ear canals and TM's normal, nose and mouth without ulcers or lesions  NECK: no adenopathy, no asymmetry, masses, or scars and thyroid normal to palpation  RESP: lungs clear to auscultation - no rales, rhonchi or wheezes  BREAST: " "normal without masses, tenderness or nipple discharge and no palpable axillary masses or adenopathy  CV: regular rate and rhythm, normal S1 S2, no S3 or S4, no murmur, click or rub, no peripheral edema and peripheral pulses strong  ABDOMEN: soft, nontender, no hepatosplenomegaly, no masses and bowel sounds normal   (male): normal male genitalia without lesions or urethral discharge, no hernia  MS: no gross musculoskeletal defects noted, no edema  SKIN: no suspicious lesions or rashes  NEURO: Normal strength and tone, mentation intact and speech normal  PSYCH: mentation appears normal, affect normal/bright  LYMPH: no cervical, supraclavicular, axillary, or inguinal adenopathy  RECTAL: normal sphincter tone, no rectal masses, prostate normal size, smooth, nontender without nodules or masses    ASSESSMENT/PLAN:   Routine general medical examination at a health care facility    Hyperlipidemia LDL goal <100  Controlled - continue medication.  - Lipid panel reflex to direct LDL Fasting  - simvastatin (ZOCOR) 20 MG tablet  Dispense: 90 tablet; Refill: 3  - Comprehensive metabolic panel (BMP + Alb, Alk Phos, ALT, AST, Total. Bili, TP)    Gastroesophageal reflux disease without esophagitis  Stable - continue medication.  - pantoprazole (PROTONIX) 40 MG EC tablet  Dispense: 90 tablet; Refill: 3  - CBC with platelets    Erectile dysfunction, unspecified erectile dysfunction type  Controlled - continue medication.  - sildenafil (VIAGRA) 50 MG tablet  Dispense: 30 tablet; Refill: 11    Screening for prostate cancer  - PROSTATE SPEC ANTIGEN SCREEN      COUNSELING:  Reviewed preventive health counseling, as reflected in patient instructions    Estimated body mass index is 30.87 kg/m  as calculated from the following:    Height as of this encounter: 1.854 m (6' 1\").    Weight as of this encounter: 106.1 kg (234 lb).  Weight management plan: Discussed healthy diet and exercise guidelines     reports that he has been smoking " cigarettes. He has a 12.50 pack-year smoking history. He quit smokeless tobacco use about 10 years ago.      Return in about 1 year (around 12/14/2021) for wellness exam with fasting labs, in person, with Dr Oracio Yuen.           Oracio Yuen MD     [address] [address]  [email or web address]   Office: [phone]  Pager: [phone]

## 2020-12-14 ENCOUNTER — OFFICE VISIT (OUTPATIENT)
Dept: FAMILY MEDICINE | Facility: CLINIC | Age: 60
End: 2020-12-14
Payer: COMMERCIAL

## 2020-12-14 VITALS
HEIGHT: 73 IN | SYSTOLIC BLOOD PRESSURE: 120 MMHG | WEIGHT: 234 LBS | OXYGEN SATURATION: 99 % | HEART RATE: 78 BPM | TEMPERATURE: 98.2 F | DIASTOLIC BLOOD PRESSURE: 76 MMHG | BODY MASS INDEX: 31.01 KG/M2

## 2020-12-14 DIAGNOSIS — Z12.5 SCREENING FOR PROSTATE CANCER: ICD-10-CM

## 2020-12-14 DIAGNOSIS — Z00.00 ROUTINE GENERAL MEDICAL EXAMINATION AT A HEALTH CARE FACILITY: Primary | ICD-10-CM

## 2020-12-14 DIAGNOSIS — N52.9 ERECTILE DYSFUNCTION, UNSPECIFIED ERECTILE DYSFUNCTION TYPE: ICD-10-CM

## 2020-12-14 DIAGNOSIS — E78.5 HYPERLIPIDEMIA LDL GOAL <100: ICD-10-CM

## 2020-12-14 DIAGNOSIS — K21.9 GASTROESOPHAGEAL REFLUX DISEASE WITHOUT ESOPHAGITIS: ICD-10-CM

## 2020-12-14 LAB
ERYTHROCYTE [DISTWIDTH] IN BLOOD BY AUTOMATED COUNT: 12.8 % (ref 10–15)
HCT VFR BLD AUTO: 48.5 % (ref 40–53)
HGB BLD-MCNC: 16.3 G/DL (ref 13.3–17.7)
MCH RBC QN AUTO: 28.8 PG (ref 26.5–33)
MCHC RBC AUTO-ENTMCNC: 33.6 G/DL (ref 31.5–36.5)
MCV RBC AUTO: 86 FL (ref 78–100)
PLATELET # BLD AUTO: 167 10E9/L (ref 150–450)
RBC # BLD AUTO: 5.66 10E12/L (ref 4.4–5.9)
WBC # BLD AUTO: 5.5 10E9/L (ref 4–11)

## 2020-12-14 PROCEDURE — 80053 COMPREHEN METABOLIC PANEL: CPT | Performed by: FAMILY MEDICINE

## 2020-12-14 PROCEDURE — 90682 RIV4 VACC RECOMBINANT DNA IM: CPT | Performed by: FAMILY MEDICINE

## 2020-12-14 PROCEDURE — 85027 COMPLETE CBC AUTOMATED: CPT | Performed by: FAMILY MEDICINE

## 2020-12-14 PROCEDURE — 99396 PREV VISIT EST AGE 40-64: CPT | Mod: 25 | Performed by: FAMILY MEDICINE

## 2020-12-14 PROCEDURE — 90471 IMMUNIZATION ADMIN: CPT | Performed by: FAMILY MEDICINE

## 2020-12-14 PROCEDURE — G0103 PSA SCREENING: HCPCS | Performed by: FAMILY MEDICINE

## 2020-12-14 PROCEDURE — 36415 COLL VENOUS BLD VENIPUNCTURE: CPT | Performed by: FAMILY MEDICINE

## 2020-12-14 PROCEDURE — 80061 LIPID PANEL: CPT | Performed by: FAMILY MEDICINE

## 2020-12-14 RX ORDER — SILDENAFIL 50 MG/1
50-100 TABLET, FILM COATED ORAL DAILY PRN
Qty: 30 TABLET | Refills: 11 | Status: SHIPPED | OUTPATIENT
Start: 2020-12-14 | End: 2021-12-16

## 2020-12-14 RX ORDER — PANTOPRAZOLE SODIUM 40 MG/1
40 TABLET, DELAYED RELEASE ORAL DAILY
Qty: 90 TABLET | Refills: 3 | Status: SHIPPED | OUTPATIENT
Start: 2020-12-14 | End: 2021-12-07

## 2020-12-14 RX ORDER — SIMVASTATIN 20 MG
20 TABLET ORAL AT BEDTIME
Qty: 90 TABLET | Refills: 3 | Status: SHIPPED | OUTPATIENT
Start: 2020-12-14 | End: 2021-12-16

## 2020-12-14 ASSESSMENT — MIFFLIN-ST. JEOR: SCORE: 1930.3

## 2020-12-15 LAB
ALBUMIN SERPL-MCNC: 3.6 G/DL (ref 3.4–5)
ALP SERPL-CCNC: 123 U/L (ref 40–150)
ALT SERPL W P-5'-P-CCNC: 33 U/L (ref 0–70)
ANION GAP SERPL CALCULATED.3IONS-SCNC: 6 MMOL/L (ref 3–14)
AST SERPL W P-5'-P-CCNC: 19 U/L (ref 0–45)
BILIRUB SERPL-MCNC: 0.4 MG/DL (ref 0.2–1.3)
BUN SERPL-MCNC: 17 MG/DL (ref 7–30)
CALCIUM SERPL-MCNC: 8.7 MG/DL (ref 8.5–10.1)
CHLORIDE SERPL-SCNC: 106 MMOL/L (ref 94–109)
CHOLEST SERPL-MCNC: 212 MG/DL
CO2 SERPL-SCNC: 25 MMOL/L (ref 20–32)
CREAT SERPL-MCNC: 0.98 MG/DL (ref 0.66–1.25)
GFR SERPL CREATININE-BSD FRML MDRD: 84 ML/MIN/{1.73_M2}
GLUCOSE SERPL-MCNC: 94 MG/DL (ref 70–99)
HDLC SERPL-MCNC: 34 MG/DL
LDLC SERPL CALC-MCNC: 139 MG/DL
NONHDLC SERPL-MCNC: 178 MG/DL
POTASSIUM SERPL-SCNC: 4.8 MMOL/L (ref 3.4–5.3)
PROT SERPL-MCNC: 7.1 G/DL (ref 6.8–8.8)
PSA SERPL-ACNC: 1.39 UG/L (ref 0–4)
SODIUM SERPL-SCNC: 136 MMOL/L (ref 133–144)
TRIGL SERPL-MCNC: 196 MG/DL

## 2020-12-16 NOTE — RESULT ENCOUNTER NOTE
Deadanya Roth,    Here is a summary of your recent test results:  -Normal red blood cell (hgb) levels, normal white blood cell count and normal platelet levels.  -PSA (prostate specific antigen) test is normal.  This indicates a low likelihood of prostate cancer.  ADVISE: rechecking this in 1 year.  -LDL(bad) cholesterol level is elevated, HDL(good) cholesterol level is low and your triglycerides are elevated which can increase your heart disease risk.  A diet high in fat and simple carbohydrates, genetics and being overweight can contribute to this. ADVISE: Taking cholesterol-lowering asthma exercising 150 minutes of aerobic exercise per week (30 minutes for 5 days per week or 50 minutes for 3 days per week are options), eating a low saturated fat/low carbohydrate diet, and omega-3 fatty acids (fish oil) 5875-6376 mg daily are helpful to improve this. In 6 months, you should recheck your fasting cholesterol panel by scheduling a lab-only appointment.  -16.2% of patients that have a similar profile to you will have a stroke, heart attack or death (related to heart disease) within the next 10 years and that is considered a high risk and cholesterol lowering medications are recommended at this time (high risk is >10%, or >7.5% if other risk factors such has high blood pressure or other heart disease risk factors).    The ASCVD risk score (Seth HARRIS Jr et al., 2013) returns the percentage likelihood of a first time Atherosclerotic Cardiovascular Disease (ASCVD) event.    The 10-year ASCVD risk score (Seth HARRIS Jr., et al., 2013) is: 16.2%    Values used to calculate the score:      Age: 60 years      Sex: Male      Is Non- : No      Diabetic: No      Tobacco smoker: Yes      Systolic Blood Pressure: 120 mmHg      Is BP treated: No      HDL Cholesterol: 34 mg/dL      Total Cholesterol: 212 mg/dL      -Liver and gallbladder tests are normal (ALT,AST, Alk phos, bilirubin), kidney function is normal (Cr,  GFR), sodium is normal, potassium is normal, calcium is normal, glucose is normal.    For additional lab test information, labtestsonline.org is an excellent reference.    In addition, here is a list of due or overdue Health Maintenance reminders:  There are no preventive care reminders to display for this patient.    Please call us at [phone] (or use Node1) to address the above recommendations if needed.           Thank you very much for trusting me and [facility].     Have a peaceful day.    Healthy regards,  Oracio Yuen MD

## 2021-09-14 ENCOUNTER — TRANSFERRED RECORDS (OUTPATIENT)
Dept: HEALTH INFORMATION MANAGEMENT | Facility: CLINIC | Age: 61
End: 2021-09-14

## 2021-09-19 ENCOUNTER — HEALTH MAINTENANCE LETTER (OUTPATIENT)
Age: 61
End: 2021-09-19

## 2021-11-11 ENCOUNTER — TRANSFERRED RECORDS (OUTPATIENT)
Dept: HEALTH INFORMATION MANAGEMENT | Facility: CLINIC | Age: 61
End: 2021-11-11
Payer: COMMERCIAL

## 2021-11-11 LAB — HEMOCCULT STL QL IA: NEGATIVE

## 2021-12-07 ENCOUNTER — MYC MEDICAL ADVICE (OUTPATIENT)
Dept: FAMILY MEDICINE | Facility: CLINIC | Age: 61
End: 2021-12-07
Payer: COMMERCIAL

## 2021-12-15 ASSESSMENT — ENCOUNTER SYMPTOMS
SHORTNESS OF BREATH: 0
EYE PAIN: 0
PALPITATIONS: 0
CONSTIPATION: 0
HEMATOCHEZIA: 0
PARESTHESIAS: 0
HEMATURIA: 0
HEARTBURN: 0
NAUSEA: 0
NERVOUS/ANXIOUS: 0
CHILLS: 0
SORE THROAT: 0
DYSURIA: 0
MYALGIAS: 0
ABDOMINAL PAIN: 0
COUGH: 0
ARTHRALGIAS: 1
WEAKNESS: 0
FEVER: 0
DIARRHEA: 0
DIZZINESS: 0
FREQUENCY: 0
JOINT SWELLING: 1
HEADACHES: 0

## 2021-12-16 ENCOUNTER — OFFICE VISIT (OUTPATIENT)
Dept: FAMILY MEDICINE | Facility: CLINIC | Age: 61
End: 2021-12-16
Payer: COMMERCIAL

## 2021-12-16 VITALS
SYSTOLIC BLOOD PRESSURE: 136 MMHG | DIASTOLIC BLOOD PRESSURE: 80 MMHG | BODY MASS INDEX: 30.71 KG/M2 | HEART RATE: 86 BPM | OXYGEN SATURATION: 96 % | WEIGHT: 247 LBS | HEIGHT: 75 IN | RESPIRATION RATE: 16 BRPM | TEMPERATURE: 97.5 F

## 2021-12-16 DIAGNOSIS — K21.9 GASTROESOPHAGEAL REFLUX DISEASE WITHOUT ESOPHAGITIS: ICD-10-CM

## 2021-12-16 DIAGNOSIS — M79.673 PAIN OF FOOT, UNSPECIFIED LATERALITY: ICD-10-CM

## 2021-12-16 DIAGNOSIS — Z12.5 SCREENING FOR MALIGNANT NEOPLASM OF PROSTATE: ICD-10-CM

## 2021-12-16 DIAGNOSIS — Z87.891 PERSONAL HISTORY OF TOBACCO USE: ICD-10-CM

## 2021-12-16 DIAGNOSIS — B35.1 ONYCHOMYCOSIS: ICD-10-CM

## 2021-12-16 DIAGNOSIS — F17.200 TOBACCO USE DISORDER: ICD-10-CM

## 2021-12-16 DIAGNOSIS — G47.33 OSA (OBSTRUCTIVE SLEEP APNEA): ICD-10-CM

## 2021-12-16 DIAGNOSIS — N52.9 ERECTILE DYSFUNCTION, UNSPECIFIED ERECTILE DYSFUNCTION TYPE: ICD-10-CM

## 2021-12-16 DIAGNOSIS — E78.5 HYPERLIPIDEMIA LDL GOAL <100: ICD-10-CM

## 2021-12-16 DIAGNOSIS — Z00.00 ROUTINE GENERAL MEDICAL EXAMINATION AT A HEALTH CARE FACILITY: Primary | ICD-10-CM

## 2021-12-16 DIAGNOSIS — Z12.11 SCREEN FOR COLON CANCER: ICD-10-CM

## 2021-12-16 LAB
ALBUMIN SERPL-MCNC: 3.3 G/DL (ref 3.4–5)
ALP SERPL-CCNC: 106 U/L (ref 40–150)
ALT SERPL W P-5'-P-CCNC: 37 U/L (ref 0–70)
ANION GAP SERPL CALCULATED.3IONS-SCNC: 7 MMOL/L (ref 3–14)
AST SERPL W P-5'-P-CCNC: 20 U/L (ref 0–45)
BILIRUB SERPL-MCNC: 0.3 MG/DL (ref 0.2–1.3)
BUN SERPL-MCNC: 17 MG/DL (ref 7–30)
CALCIUM SERPL-MCNC: 8.8 MG/DL (ref 8.5–10.1)
CHLORIDE BLD-SCNC: 108 MMOL/L (ref 94–109)
CO2 SERPL-SCNC: 23 MMOL/L (ref 20–32)
CREAT SERPL-MCNC: 0.97 MG/DL (ref 0.66–1.25)
ERYTHROCYTE [DISTWIDTH] IN BLOOD BY AUTOMATED COUNT: 12.5 % (ref 10–15)
GFR SERPL CREATININE-BSD FRML MDRD: 84 ML/MIN/1.73M2
GLUCOSE BLD-MCNC: 104 MG/DL (ref 70–99)
HCT VFR BLD AUTO: 44.6 % (ref 40–53)
HGB BLD-MCNC: 15.4 G/DL (ref 13.3–17.7)
MCH RBC QN AUTO: 29.7 PG (ref 26.5–33)
MCHC RBC AUTO-ENTMCNC: 34.5 G/DL (ref 31.5–36.5)
MCV RBC AUTO: 86 FL (ref 78–100)
PLATELET # BLD AUTO: 159 10E3/UL (ref 150–450)
POTASSIUM BLD-SCNC: 4.1 MMOL/L (ref 3.4–5.3)
PROT SERPL-MCNC: 7.2 G/DL (ref 6.8–8.8)
PSA SERPL-MCNC: 1.11 UG/L (ref 0–4)
RBC # BLD AUTO: 5.19 10E6/UL (ref 4.4–5.9)
SODIUM SERPL-SCNC: 138 MMOL/L (ref 133–144)
WBC # BLD AUTO: 6.2 10E3/UL (ref 4–11)

## 2021-12-16 PROCEDURE — 99214 OFFICE O/P EST MOD 30 MIN: CPT | Mod: 25 | Performed by: FAMILY MEDICINE

## 2021-12-16 PROCEDURE — 80053 COMPREHEN METABOLIC PANEL: CPT | Performed by: FAMILY MEDICINE

## 2021-12-16 PROCEDURE — 36415 COLL VENOUS BLD VENIPUNCTURE: CPT | Performed by: FAMILY MEDICINE

## 2021-12-16 PROCEDURE — 90471 IMMUNIZATION ADMIN: CPT | Performed by: FAMILY MEDICINE

## 2021-12-16 PROCEDURE — 99396 PREV VISIT EST AGE 40-64: CPT | Mod: 25 | Performed by: FAMILY MEDICINE

## 2021-12-16 PROCEDURE — 90682 RIV4 VACC RECOMBINANT DNA IM: CPT | Performed by: FAMILY MEDICINE

## 2021-12-16 PROCEDURE — 85027 COMPLETE CBC AUTOMATED: CPT | Performed by: FAMILY MEDICINE

## 2021-12-16 PROCEDURE — 80061 LIPID PANEL: CPT | Performed by: FAMILY MEDICINE

## 2021-12-16 PROCEDURE — G0103 PSA SCREENING: HCPCS | Performed by: FAMILY MEDICINE

## 2021-12-16 RX ORDER — TERBINAFINE HYDROCHLORIDE 250 MG/1
250 TABLET ORAL DAILY
Qty: 28 TABLET | Refills: 0 | Status: SHIPPED | OUTPATIENT
Start: 2021-12-16 | End: 2022-03-22

## 2021-12-16 RX ORDER — PANTOPRAZOLE SODIUM 40 MG/1
40 TABLET, DELAYED RELEASE ORAL DAILY
Qty: 90 TABLET | Refills: 3 | Status: SHIPPED | OUTPATIENT
Start: 2021-12-16 | End: 2022-12-15

## 2021-12-16 RX ORDER — SILDENAFIL 50 MG/1
50-100 TABLET, FILM COATED ORAL DAILY PRN
Qty: 30 TABLET | Refills: 11 | Status: SHIPPED | OUTPATIENT
Start: 2021-12-16 | End: 2022-12-15

## 2021-12-16 RX ORDER — SIMVASTATIN 20 MG
20 TABLET ORAL AT BEDTIME
Qty: 90 TABLET | Refills: 3 | Status: SHIPPED | OUTPATIENT
Start: 2021-12-16 | End: 2021-12-23 | Stop reason: ALTCHOICE

## 2021-12-16 ASSESSMENT — ENCOUNTER SYMPTOMS
SORE THROAT: 0
EYE PAIN: 0
NAUSEA: 0
NERVOUS/ANXIOUS: 0
MYALGIAS: 0
HEADACHES: 0
FREQUENCY: 0
ABDOMINAL PAIN: 0
HEMATOCHEZIA: 0
DIARRHEA: 0
FEVER: 0
CHILLS: 0
HEARTBURN: 0
SHORTNESS OF BREATH: 0
HEMATURIA: 0
ARTHRALGIAS: 1
DIZZINESS: 0
PALPITATIONS: 0
WEAKNESS: 0
CONSTIPATION: 0
JOINT SWELLING: 1
DYSURIA: 0
COUGH: 0
PARESTHESIAS: 0

## 2021-12-16 ASSESSMENT — MIFFLIN-ST. JEOR: SCORE: 2016.01

## 2021-12-16 NOTE — PROGRESS NOTES
SUBJECTIVE:   CC: Gonzalez Beard is an 61 year old male who presents for preventative health visit.     {  Patient has been advised of split billing requirements and indicates understanding: Yes  Healthy Habits:     Getting at least 3 servings of Calcium per day:  Yes    Bi-annual eye exam:  Yes    Dental care twice a year:  NO    Sleep apnea or symptoms of sleep apnea:  Daytime drowsiness, Excessive snoring and Sleep apnea    Diet:  Regular (no restrictions)    Frequency of exercise:  None    Taking medications regularly:  No    Barriers to taking medications:  Problems remembering to take them    Medication side effects:  Not applicable    PHQ-2 Total Score: 0    Additional concerns today:  Yes    Right forefoot pain - hurt at cabin and ongoing -  Negative xray at ortho ~ 3 months ago.     Onychomycosis changes  For years and interested in oral treatment.    Occasional dysphagia      Hyperlipidemia Follow-Up      Are you regularly taking any medication or supplement to lower your cholesterol?   No - never started    Are you having muscle aches or other side effects that you think could be caused by your cholesterol lowering medication?  No patient has not been taking      Today's PHQ-2 Score:   PHQ-2 ( 1999 Pfizer) 12/15/2021   Q1: Little interest or pleasure in doing things 0   Q2: Feeling down, depressed or hopeless 0   PHQ-2 Score 0   PHQ-2 Total Score (12-17 Years)- Positive if 3 or more points; Administer PHQ-A if positive -   Q1: Little interest or pleasure in doing things Not at all   Q2: Feeling down, depressed or hopeless Not at all   PHQ-2 Score 0     Abuse: Current or Past(Physical, Sexual or Emotional)- No  Do you feel safe in your environment? Yes    Social History     Tobacco Use     Smoking status: Current Every Day Smoker     Packs/day: 0.80     Years: 25.00     Pack years: 20.00     Types: Cigarettes     Smokeless tobacco: Current User     Last attempt to quit: 4/30/2010     Tobacco comment: quit  2010- restarted 2/2018   Substance Use Topics     Alcohol use: Yes     Alcohol/week: 10.0 standard drinks     Comment: 3 x week     If you drink alcohol do you typically have >3 drinks per day or >7 drinks per week? Yes        AUDIT - Alcohol Use Disorders Identification Test - Reproduced from the World Health Organization Audit 2001 (Second Edition) 12/15/2021   1.  How often do you have a drink containing alcohol? 2 to 3 times a week   2.  How many drinks containing alcohol do you have on a typical day when you are drinking? 3 or 4   3.  How often do you have five or more drinks on one occasion? Monthly   4.  How often during the last year have you found that you were not able to stop drinking once you had started? Never   5.  How often during the last year have you failed to do what was normally expected of you because of drinking? Never   6.  How often during the last year have you needed a first drink in the morning to get yourself going after a heavy drinking session? Never   7.  How often during the last year have you had a feeling of guilt or remorse after drinking? Never   8.  How often during the last year have you been unable to remember what happened the night before because of your drinking? Never   9.  Have you or someone else been injured because of your drinking? No   10. Has a relative, friend, doctor or other health care worker been concerned about your drinking or suggested you cut down? No   TOTAL SCORE 6     Last PSA:   PSA   Date Value Ref Range Status   12/14/2020 1.39 0 - 4 ug/L Final     Comment:     Assay Method:  Chemiluminescence using Siemens Vista analyzer       Reviewed orders with patient. Reviewed health maintenance and updated orders accordingly - Yes    Reviewed and updated as needed this visit by clinical staff  Tobacco  Allergies  Meds  Problems  Med Hx  Surg Hx  Fam Hx  Soc Hx         Reviewed and updated as needed this visit by Provider  Tobacco  Allergies  Meds   "Problems  Med Hx  Surg Hx  Fam Hx        Review of Systems   Constitutional: Negative for chills and fever.   HENT: Negative for congestion, ear pain, hearing loss and sore throat.    Eyes: Negative for pain and visual disturbance.   Respiratory: Negative for cough and shortness of breath.    Cardiovascular: Negative for chest pain and palpitations.   Gastrointestinal: Negative for abdominal pain, constipation, diarrhea, heartburn, hematochezia and nausea.   Genitourinary: Positive for impotence and urgency. Negative for dysuria, frequency, genital sores, hematuria and penile discharge.   Musculoskeletal: Positive for arthralgias and joint swelling. Negative for myalgias.   Skin: Negative for rash.   Neurological: Negative for dizziness, weakness, headaches and paresthesias.   Psychiatric/Behavioral: Negative for mood changes. The patient is not nervous/anxious.        OBJECTIVE:   /80   Pulse 86   Temp 97.5  F (36.4  C) (Tympanic)   Resp 16   Ht 1.905 m (6' 3\")   Wt 112 kg (247 lb)   SpO2 96%   BMI 30.87 kg/m    EXAM:  GENERAL: healthy, alert and no distress  EYES: Eyes grossly normal to inspection, PERRL and conjunctivae and sclerae normal  HENT: ear canals and TM's normal, nose and mouth without ulcers or lesions  NECK: no adenopathy, no asymmetry, masses, or scars and thyroid normal to palpation  RESP: lungs clear to auscultation - no rales, rhonchi or wheezes  BREAST: normal without masses, tenderness or nipple discharge and no palpable axillary masses or adenopathy  CV: regular rate and rhythm, normal S1 S2, no S3 or S4, no murmur, click or rub, no peripheral edema and peripheral pulses strong  ABDOMEN: soft, nontender, no hepatosplenomegaly, no masses and bowel sounds normal   (male): normal male genitalia without lesions or urethral discharge, no hernia  MS: no gross musculoskeletal defects noted, no edema  SKIN: multiple toes with onycholysis otherwise  no suspicious lesions or " rashes  NEURO: Normal strength and tone, mentation intact and speech normal  PSYCH: mentation appears normal, affect normal/bright  LYMPH: no cervical, supraclavicular, axillary, or inguinal adenopathy  RECTAL: normal sphincter tone, no rectal masses, prostate normal size, smooth, nontender without nodules or masses        ASSESSMENT/PLAN:   Routine general medical examination at a health care facility      Hyperlipidemia LDL goal <100  Did not start previous simvastatin prescription and will start now due to his family history of heart disease  - simvastatin (ZOCOR) 20 MG tablet  Dispense: 90 tablet; Refill: 3  - Comprehensive metabolic panel  - Lipid panel reflex to direct LDL Fasting    Gastroesophageal reflux disease without esophagitis  Intermittent dysphagia, does not desire referral to gastroenterologist at this point has had previous dilatation procedures.  Will continue current Rx and recommended avoiding acidic intake  - pantoprazole (PROTONIX) 40 MG EC tablet  Dispense: 90 tablet; Refill: 3  - CBC with platelets    Erectile dysfunction, unspecified erectile dysfunction type  Ongoing and would like refill:  - sildenafil (VIAGRA) 50 MG tablet  Dispense: 30 tablet; Refill: 11    Screen for colon cancer  - Fecal colorectal cancer screen (FIT) done to review care and will bring by results    Screening for malignant neoplasm of prostate  - Prostate Specific Antigen Screen      LUIS E (obstructive sleep apnea)  Continue treatment    Tobacco use disorder  Personal history of tobacco use  20-pack-year history and will check:  - Prof fee: Shared Decisionmaking for Lung Cancer Screening  - CT Chest Lung Cancer Scrn Low Dose wo    Foot Pain   Onset after some overuse while cabin.  X-rays negative elsewhere.  Recommended metatarsal pad and/or semicustom orthotic.    Onychomycosis  Multiple toes and desires treatment:  - terbinafine (LAMISIL) 250 MG tablet  Dispense: 28 tablet; Refill: 0      COUNSELING:  Reviewed  "preventive health counseling, as reflected in patient instructions      Estimated body mass index is 30.87 kg/m  as calculated from the following:    Height as of this encounter: 1.905 m (6' 3\").    Weight as of this encounter: 112 kg (247 lb).  Weight management plan: Discussed healthy diet and exercise guidelines     reports that he has been smoking cigarettes. He has a 20.00 pack-year smoking history. He uses smokeless tobacco.  Tobacco Cessation Action Plan: Self help information given to patient    Return in about 1 year (around 12/16/2022) for wellness exam with fasting labs with Oracio Yuen MD.         Oracio Yuen MD     [address] [address]  [email or web address]     Office: [phone]     Lung Cancer Screening Shared Decision Making Visit     Gonzalez Beard is eligible for lung cancer screening on the basis of the information provided in my signed lung cancer screening order.     I have discussed with patient the risks and benefits of screening for lung cancer with low-dose CT.     The risks include:  radiation exposure: one low dose chest CT has as much ionizing radiation as about 15 chest x-rays or 6 months of background radiation living in [place]    false positives: 96% of positive findings/nodules are NOT cancer, but some might still require additional diagnostic evaluation, including biopsy  over-diagnosis: some slow growing cancers that might never have been clinically significant will be detected and treated unnecessarily     The benefit of early detection of lung cancer is contingent upon adherence to annual screening or more frequent follow up if indicated.     Furthermore, reaping the benefits of screening requires Gonzalez Beard to be willing and physically able to undergo diagnostic procedures, if indicated. Although no specific guide is available for determining severity of comorbidities, it is reasonable to withhold screening in patients who " have greater mortality risk from other diseases.     We did discuss that the only way to prevent lung cancer is to not smoke. Smoking cessation counseling was given, duration < 3 minutes.

## 2021-12-16 NOTE — PATIENT INSTRUCTIONS
Preventive Health Recommendations  Male Ages 50 - 64    Yearly exam:             See your health care provider every year in order to  o   Review health changes.   o   Discuss preventive care.    o   Review your medicines if your doctor has prescribed any.     Have a cholesterol test every 5 years, or more frequently if you are at risk for high cholesterol/heart disease.     Have a diabetes test (fasting glucose) every three years. If you are at risk for diabetes, you should have this test more often.     Have a colonoscopy at age 50, or have a yearly FIT test (stool test). These exams will check for colon cancer.      Talk with your health care provider about whether or not a prostate cancer screening test (PSA) is right for you.    You should be tested each year for STDs (sexually transmitted diseases), if you re at risk.     Shots: Get a flu shot each year. Get a tetanus shot every 10 years.     Nutrition:    Eat at least 5 servings of fruits and vegetables daily.     Eat whole-grain bread, whole-wheat pasta and brown rice instead of white grains and rice.     Get adequate Calcium and Vitamin D.     Lifestyle    Exercise for at least 150 minutes a week (30 minutes a day, 5 days a week). This will help you control your weight and prevent disease.     Limit alcohol to one drink per day.     No smoking.     Wear sunscreen to prevent skin cancer.     See your dentist every six months for an exam and cleaning.     See your eye doctor every 1 to 2 years.      Try [facility] Jess Obregon at [facility]    Bring by FIT test results    Lung Cancer Screening   Frequently Asked Questions  If you are at high-risk for lung cancer, getting screened with low-dose computed tomography (LDCT) every year can help save your life. This handout offers answers to some of the most common questions about lung cancer screening. If you have other questions, please call [phone] ([phone]).     What is it?  Lung cancer screening  uses special X-ray technology to create an image of your lung tissue. The exam is quick and easy and takes less than 10 seconds. We don t give you any medicine or use any needles. You can eat before and after the exam. You don t need to change your clothes as long as the clothing on your chest doesn t contain metal. But, you do need to be able to hold your breath for at least 6 seconds during the exam.    What is the goal of lung cancer screening?  The goal of lung cancer screening is to save lives. Many times, lung cancer is not found until a person starts having physical symptoms. Lung cancer screening can help detect lung cancer in the earliest stages when it may be easier to treat.    Who should be screened for lung cancer?  We suggest lung cancer screening for anyone who is at high-risk for lung cancer. You are in the high-risk group if you:      are between the ages of 55 and 79, and    have smoked at least 1 pack of cigarettes a day for 30 or more years, and    still smoke or have quit within the past 15 years.    However, if you have a new cough or shortness of breath, you should talk to your doctor before being screened.    Some national lung health advocacy groups also recommend screening for people ages 50 to 79 who have smoked an average of 1 pack of cigarettes a day for 20 years. They must also have at least 1 other risk factor for lung cancer, not including exposure to secondhand smoke. Other risk factors are having had cancer in the past, emphysema, pulmonary fibrosis, COPD, a family history of lung cancer, or exposure to certain materials such as arsenic, asbestos, beryllium, cadmium, chromium, diesel fumes, nickel, radon or silica. Your care team can help you know if you have one of these risk factors.     Why does it matter if I have symptoms?  Certain symptoms can be a sign that you have a condition in your lungs that should be checked and treated by your doctor. These symptoms include fever,  chest pain, a new or changing cough, shortness of breath that you have never felt before, coughing up blood or unexplained weight loss. Having any of these symptoms can greatly affect the results of lung cancer screening.       Should all smokers get an LDCT lung cancer screening exam?  It depends. Lung cancer screening is for a very specific group of men and women who have a history of heavy smoking over a long period of time (see  Who should be screened for lung cancer  above).  I am in the high-risk group, but have been diagnosed with cancer in the past. Is LDCT lung cancer screening right for me?  In some cases, you should not have LDCT lung screening, such as when your doctor is already following your cancer with CT scan studies. Your doctor will help you decide if LDCT lung screening is right for you.  Do I need to have a screening exam every year?  Yes. If you are in the high-risk group described earlier, you should get an LDCT lung cancer screening exam every year until you are 79, or are no longer willing or able to undergo screening and possible procedures to diagnose and treat lung cancer.  How effective is LDCT at preventing death from lung cancer?  Studies have shown that LDCT lung cancer screening can lower the risk of death from lung cancer by 20 percent in people who are at high-risk.  What are the risks?  There are some risks and limitations of LDCT lung cancer screening. We want to make sure you understand the risks and benefits, so please let us know if you have any questions. Your doctor may want to talk with you more about these risks.    Radiation exposure: As with any exam that uses radiation, there is a very small increased risk of cancer. The amount of radiation in LDCT is small--about the same amount a person would get from a mammogram. Your doctor orders the exam when he or she feels the potential benefits outweigh the risks.    False negatives: No test is perfect, including LDCT. It is  possible that you may have a medical condition, including lung cancer, that is not found during your exam. This is called a false negative result.    False positives and more testing: LDCT very often finds something in the lung that could be cancer, but in fact is not. This is called a false positive result. False positive tests often cause anxiety. To make sure these findings are not cancer, you may need to have more tests. These tests will be done only if you give us permission. Sometimes patients need a treatment that can have side effects, such as a biopsy. For more information on false positives, see  What can I expect from the results?     Findings not related to lung cancer: Your LDCT exam also takes pictures of areas of your body next to your lungs. In a very small number of cases, the CT scan will show an abnormal finding in one of these areas, such as your kidneys, adrenal glands, liver or thyroid. This finding may not be serious, but you may need more tests. Your doctor can help you decide what other tests you may need, if any.  What can I expect from the results?  About 1 out of 4 LDCT exams will find something that may need more tests. Most of the time, these findings are lung nodules. Lung nodules are very small collections of tissue in the lung. These nodules are very common, and the vast majority--more than 97 percent--are not cancer (benign). Most are normal lymph nodes or small areas of scarring from past infections.  But, if a small lung nodule is found to be cancer, the cancer can be cured more than 90 percent of the time. To know if the nodule is cancer, we may need to get more images before your next yearly screening exam. If the nodule has suspicious features (for example, it is large, has an odd shape or grows over time), we will refer you to a specialist for further testing.  Will my doctor also get the results?  Yes. Your doctor will get a copy of your results.  Is it okay to keep smoking  now that there s a cancer screening exam?  No. Tobacco is one of the strongest cancer-causing agents. It causes not only lung cancer, but other cancers and cardiovascular (heart) diseases as well. The damage caused by smoking builds over time. This means that the longer you smoke, the higher your risk of disease. While it is never too late to quit, the sooner you quit, the better.  Where can I find help to quit smoking?  The best way to prevent lung cancer is to stop smoking. If you have already quit smoking, congratulations and keep it up! For help on quitting smoking, please call [employer] at 1-401-911-GISI ([phone]) or the American Cancer Society at 1-534.667.7830 to find local resources near you.  One-on-one health coaching:  If you d prefer to work individually with a health care provider on tobacco cessation, we offer:      Medication Therapy Management:  Our specially trained pharmacists work closely with you and your doctor to help you quit smoking.  Call [phone] or [phone] (toll free).     Can Do: Health coaching offered by [facility].  [email or web address]

## 2021-12-18 LAB
CHOLEST SERPL-MCNC: 209 MG/DL
FASTING STATUS PATIENT QL REPORTED: YES
HDLC SERPL-MCNC: 39 MG/DL
LDLC SERPL CALC-MCNC: 131 MG/DL
NONHDLC SERPL-MCNC: 170 MG/DL
TRIGL SERPL-MCNC: 196 MG/DL

## 2021-12-23 RX ORDER — ATORVASTATIN CALCIUM 40 MG/1
40 TABLET, FILM COATED ORAL DAILY
Qty: 90 TABLET | Refills: 3 | Status: SHIPPED | OUTPATIENT
Start: 2021-12-23 | End: 2022-03-03 | Stop reason: SINTOL

## 2021-12-24 NOTE — RESULT ENCOUNTER NOTE
Dear Gonzalez,    Here is a summary of your recent test results:  -Normal red blood cell (hgb) levels, normal white blood cell count and normal platelet levels.  -PSA (prostate specific antigen) test is normal.  This indicates a low likelihood of prostate cancer.  ADVISE: rechecking this in 1 year.  -LDL(bad) cholesterol level is elevated, HDL(good) cholesterol level is low and your triglycerides are elevated which can increase your heart disease risk.  A diet high in fat and simple carbohydrates, genetics and being overweight can contribute to this. ADVISE: exercising 150 minutes of aerobic exercise per week (30 minutes for 5 days per week or 50 minutes for 3 days per week are options), eating a low saturated fat/low carbohydrate diet, and omega-3 fatty acids (fish oil) 8305-6217 mg daily are helpful to improve this.     Your cholesterol numbers are high despite being on simvastatin.  I am sending a higher potency cholesterol medication to your pharmacy: atorvastatin (Lipitor) 40mg each evening.  Also, you should recheck your fasting cholesterol panel in 3 months by scheduling a lab-only appointment.   -Liver and gallbladder tests (ALT,AST, Alk phos,bilirubin) are normal.  -Kidney function (GFR) is normal.  -Sodium is normal.  -Potassium is normal.  -Calcium is normal.  -Glucose is slight elevated and may be a sign of early diabetes (prediabetes). ADVISE:: eating a low carbohydrate diet, exercising, trying to lose weight (if necessary) and rechecking your glucose level in 12 months.    For additional lab test information, labtestsonline.org is an excellent reference.    In addition, here is a list of due or overdue Health Maintenance reminders:  LUNG CANCER SCREENING Never done  COVID-19 Vaccine(3 - Booster for Pfizer series) due on 12/14/2021    Please call us at [phone] (or use S-cubism) to address the above recommendations if needed.           Thank you very much for trusting me and [facility].     Have a peaceful day.    Healthy regards,  Oracio Yuen MD

## 2021-12-27 ENCOUNTER — MYC MEDICAL ADVICE (OUTPATIENT)
Dept: FAMILY MEDICINE | Facility: CLINIC | Age: 61
End: 2021-12-27
Payer: COMMERCIAL

## 2021-12-28 ENCOUNTER — MYC MEDICAL ADVICE (OUTPATIENT)
Dept: FAMILY MEDICINE | Facility: CLINIC | Age: 61
End: 2021-12-28
Payer: COMMERCIAL

## 2021-12-28 ENCOUNTER — HOSPITAL ENCOUNTER (OUTPATIENT)
Dept: CT IMAGING | Facility: CLINIC | Age: 61
Discharge: HOME | End: 2021-12-28
Attending: FAMILY MEDICINE | Admitting: FAMILY MEDICINE
Payer: COMMERCIAL

## 2021-12-28 DIAGNOSIS — Z87.891 PERSONAL HISTORY OF TOBACCO USE: ICD-10-CM

## 2021-12-28 DIAGNOSIS — F17.200 TOBACCO USE DISORDER: ICD-10-CM

## 2021-12-28 PROCEDURE — 71271 CT THORAX LUNG CANCER SCR C-: CPT

## 2021-12-28 NOTE — TELEPHONE ENCOUNTER
Please see my chart message below     Please review and advise     Thank you     Arabella Ontiveros RN, BSN  [place] [facility]

## 2021-12-28 NOTE — TELEPHONE ENCOUNTER
Please see my chart message below     Please review and advise     Thank you     Arabella Ontiveros RN, BSN  [place] [facility]

## 2021-12-28 NOTE — RESULT ENCOUNTER NOTE
Dear Gonzalez,    Here is a summary of your recent test results:  -Lung CT did not show any signs of suspicious lung nodules. ADVISE: Rechecking a lung CT scan in 12 months.           Thank you very much for trusting me and [facility].     Have a peaceful day.    Healthy regards,  Oracio Yuen MD

## 2022-02-24 ENCOUNTER — MYC MEDICAL ADVICE (OUTPATIENT)
Dept: FAMILY MEDICINE | Facility: CLINIC | Age: 62
End: 2022-02-24
Payer: COMMERCIAL

## 2022-02-24 DIAGNOSIS — E78.5 HYPERLIPIDEMIA LDL GOAL <100: ICD-10-CM

## 2022-02-25 NOTE — TELEPHONE ENCOUNTER
"Pt up to bathroom independently, steady gait. Patient gave urine sample and back to bed. Patient is calm and cooperative. States he remembers a little last night but \"I remembered I OD'd on accident, we were all just wanting to get fucked up\". Asked patient what he took and said \"I took a bar of xanax and fentanyl, crushed and snorted it, guess it was too much\". When I asked patient if he was trying to kill himself or hurt himself, he said \"no not at all, just wanted to get fucked up\".  When I asked him if he would talk to [facility], he said \"no I dont need to talk to anyone, I lost my brother and my twin and it sucks but no one is going to help me\".   " [employer] message sent  Leeann OBREGON RN   [facility]

## 2022-03-01 NOTE — TELEPHONE ENCOUNTER
Please see my chart messages below     Please review and advise     Thank you     Arabella Ontiveros RN, BSN  [place] [facility]

## 2022-03-03 RX ORDER — SIMVASTATIN 20 MG
20 TABLET ORAL AT BEDTIME
Qty: 90 TABLET | Refills: 3 | Status: SHIPPED | OUTPATIENT
Start: 2022-03-03 | End: 2022-12-15

## 2022-03-22 ENCOUNTER — MYC REFILL (OUTPATIENT)
Dept: FAMILY MEDICINE | Facility: CLINIC | Age: 62
End: 2022-03-22
Payer: COMMERCIAL

## 2022-03-22 DIAGNOSIS — B35.1 ONYCHOMYCOSIS: ICD-10-CM

## 2022-03-23 RX ORDER — TERBINAFINE HYDROCHLORIDE 250 MG/1
250 TABLET ORAL DAILY
Qty: 7 TABLET | Refills: 0 | Status: SHIPPED | OUTPATIENT
Start: 2022-03-23 | End: 2024-01-13

## 2022-03-23 NOTE — TELEPHONE ENCOUNTER
terbinafine (LAMISIL) 250 MG tablet 28 tablet 0 12/16/2021  --   Sig - Route: Take 1 tablet (250 mg) by mouth daily for 7 days and repeat monthly for 4 cycles - Oral   Sent to pharmacy as: Terbinafine HCl 250 MG Oral Tablet (lamISIL)   Class: E-Prescribe       Last office visit: 12/16/2021     Future Office Visit:        CSA -- Patient Level:    CSA: None found at the patient level.             Routing refill request to provider for review/approval because:  Drug not on the [facility] refill protocol     Arabella Ontiveros RN, BSN  [facility]

## 2022-11-17 ENCOUNTER — MYC MEDICAL ADVICE (OUTPATIENT)
Dept: FAMILY MEDICINE | Facility: CLINIC | Age: 62
End: 2022-11-17

## 2022-11-17 DIAGNOSIS — M79.671 RIGHT FOOT PAIN: Primary | ICD-10-CM

## 2022-11-20 ENCOUNTER — HEALTH MAINTENANCE LETTER (OUTPATIENT)
Age: 62
End: 2022-11-20

## 2022-12-14 ASSESSMENT — ENCOUNTER SYMPTOMS
CONSTIPATION: 0
NERVOUS/ANXIOUS: 0
COUGH: 0
ARTHRALGIAS: 0
HEMATOCHEZIA: 0
DYSURIA: 0
SORE THROAT: 0
HEMATURIA: 0
DIARRHEA: 0
CHILLS: 0
HEADACHES: 0
MYALGIAS: 0
NAUSEA: 0
JOINT SWELLING: 0
SHORTNESS OF BREATH: 0
EYE PAIN: 0
PARESTHESIAS: 0
ABDOMINAL PAIN: 0
FREQUENCY: 0
HEARTBURN: 0
FEVER: 0
WEAKNESS: 0
DIZZINESS: 0
PALPITATIONS: 0

## 2022-12-15 ENCOUNTER — OFFICE VISIT (OUTPATIENT)
Dept: FAMILY MEDICINE | Facility: CLINIC | Age: 62
End: 2022-12-15
Payer: COMMERCIAL

## 2022-12-15 VITALS
RESPIRATION RATE: 16 BRPM | BODY MASS INDEX: 31.61 KG/M2 | TEMPERATURE: 96.1 F | SYSTOLIC BLOOD PRESSURE: 120 MMHG | HEART RATE: 78 BPM | OXYGEN SATURATION: 96 % | DIASTOLIC BLOOD PRESSURE: 74 MMHG | HEIGHT: 75 IN | WEIGHT: 254.2 LBS

## 2022-12-15 DIAGNOSIS — K21.9 GASTROESOPHAGEAL REFLUX DISEASE WITHOUT ESOPHAGITIS: ICD-10-CM

## 2022-12-15 DIAGNOSIS — Z13.1 SCREENING FOR DIABETES MELLITUS: ICD-10-CM

## 2022-12-15 DIAGNOSIS — Z12.5 SCREENING FOR PROSTATE CANCER: ICD-10-CM

## 2022-12-15 DIAGNOSIS — B35.1 ONYCHOMYCOSIS: ICD-10-CM

## 2022-12-15 DIAGNOSIS — N52.9 ERECTILE DYSFUNCTION, UNSPECIFIED ERECTILE DYSFUNCTION TYPE: ICD-10-CM

## 2022-12-15 DIAGNOSIS — Z87.891 PERSONAL HISTORY OF TOBACCO USE: ICD-10-CM

## 2022-12-15 DIAGNOSIS — E78.5 HYPERLIPIDEMIA LDL GOAL <100: ICD-10-CM

## 2022-12-15 DIAGNOSIS — Z12.11 SCREEN FOR COLON CANCER: ICD-10-CM

## 2022-12-15 DIAGNOSIS — Z00.00 ROUTINE GENERAL MEDICAL EXAMINATION AT A HEALTH CARE FACILITY: Primary | ICD-10-CM

## 2022-12-15 DIAGNOSIS — M79.671 RIGHT FOOT PAIN: ICD-10-CM

## 2022-12-15 LAB
ALBUMIN SERPL BCG-MCNC: 4.2 G/DL (ref 3.5–5.2)
ALP SERPL-CCNC: 108 U/L (ref 40–129)
ALT SERPL W P-5'-P-CCNC: 28 U/L (ref 10–50)
ANION GAP SERPL CALCULATED.3IONS-SCNC: 12 MMOL/L (ref 7–15)
AST SERPL W P-5'-P-CCNC: 27 U/L (ref 10–50)
BILIRUB SERPL-MCNC: 0.3 MG/DL
BUN SERPL-MCNC: 17.8 MG/DL (ref 8–23)
CALCIUM SERPL-MCNC: 9 MG/DL (ref 8.8–10.2)
CHLORIDE SERPL-SCNC: 104 MMOL/L (ref 98–107)
CHOLEST SERPL-MCNC: 169 MG/DL
CREAT SERPL-MCNC: 1.03 MG/DL (ref 0.67–1.17)
DEPRECATED HCO3 PLAS-SCNC: 22 MMOL/L (ref 22–29)
ERYTHROCYTE [DISTWIDTH] IN BLOOD BY AUTOMATED COUNT: 12.8 % (ref 10–15)
GFR SERPL CREATININE-BSD FRML MDRD: 83 ML/MIN/1.73M2
GLUCOSE SERPL-MCNC: 100 MG/DL (ref 70–99)
HCT VFR BLD AUTO: 44.5 % (ref 40–53)
HDLC SERPL-MCNC: 34 MG/DL
HGB BLD-MCNC: 15.6 G/DL (ref 13.3–17.7)
LDLC SERPL CALC-MCNC: 105 MG/DL
MCH RBC QN AUTO: 29.4 PG (ref 26.5–33)
MCHC RBC AUTO-ENTMCNC: 35.1 G/DL (ref 31.5–36.5)
MCV RBC AUTO: 84 FL (ref 78–100)
NONHDLC SERPL-MCNC: 135 MG/DL
PLATELET # BLD AUTO: 163 10E3/UL (ref 150–450)
POTASSIUM SERPL-SCNC: 4.4 MMOL/L (ref 3.4–5.3)
PROT SERPL-MCNC: 6.8 G/DL (ref 6.4–8.3)
PSA SERPL-MCNC: 0.88 NG/ML (ref 0–4.5)
RBC # BLD AUTO: 5.3 10E6/UL (ref 4.4–5.9)
SODIUM SERPL-SCNC: 138 MMOL/L (ref 136–145)
TRIGL SERPL-MCNC: 151 MG/DL
WBC # BLD AUTO: 6.1 10E3/UL (ref 4–11)

## 2022-12-15 PROCEDURE — G0296 VISIT TO DETERM LDCT ELIG: HCPCS | Performed by: FAMILY MEDICINE

## 2022-12-15 PROCEDURE — 80061 LIPID PANEL: CPT | Performed by: FAMILY MEDICINE

## 2022-12-15 PROCEDURE — 90471 IMMUNIZATION ADMIN: CPT | Performed by: FAMILY MEDICINE

## 2022-12-15 PROCEDURE — 99214 OFFICE O/P EST MOD 30 MIN: CPT | Mod: 25 | Performed by: FAMILY MEDICINE

## 2022-12-15 PROCEDURE — 90682 RIV4 VACC RECOMBINANT DNA IM: CPT | Performed by: FAMILY MEDICINE

## 2022-12-15 PROCEDURE — 36415 COLL VENOUS BLD VENIPUNCTURE: CPT | Performed by: FAMILY MEDICINE

## 2022-12-15 PROCEDURE — G0103 PSA SCREENING: HCPCS | Performed by: FAMILY MEDICINE

## 2022-12-15 PROCEDURE — 99396 PREV VISIT EST AGE 40-64: CPT | Mod: 25 | Performed by: FAMILY MEDICINE

## 2022-12-15 PROCEDURE — 85027 COMPLETE CBC AUTOMATED: CPT | Performed by: FAMILY MEDICINE

## 2022-12-15 PROCEDURE — 80053 COMPREHEN METABOLIC PANEL: CPT | Performed by: FAMILY MEDICINE

## 2022-12-15 RX ORDER — PANTOPRAZOLE SODIUM 40 MG/1
40 TABLET, DELAYED RELEASE ORAL DAILY
Qty: 90 TABLET | Refills: 3 | Status: SHIPPED | OUTPATIENT
Start: 2022-12-15 | End: 2024-03-18

## 2022-12-15 RX ORDER — SILDENAFIL 50 MG/1
50-100 TABLET, FILM COATED ORAL DAILY PRN
Qty: 30 TABLET | Refills: 11 | Status: SHIPPED | OUTPATIENT
Start: 2022-12-15 | End: 2024-01-13

## 2022-12-15 RX ORDER — TERBINAFINE HYDROCHLORIDE 250 MG/1
250 TABLET ORAL DAILY
Qty: 60 TABLET | Refills: 0 | Status: SHIPPED | OUTPATIENT
Start: 2022-12-15 | End: 2024-01-13

## 2022-12-15 RX ORDER — SIMVASTATIN 20 MG
20 TABLET ORAL AT BEDTIME
Qty: 90 TABLET | Refills: 3 | Status: SHIPPED | OUTPATIENT
Start: 2022-12-15 | End: 2024-02-14

## 2022-12-15 ASSESSMENT — ENCOUNTER SYMPTOMS
HEMATOCHEZIA: 0
CHILLS: 0
WEAKNESS: 0
EYE PAIN: 0
HEADACHES: 0
FREQUENCY: 0
SHORTNESS OF BREATH: 0
SORE THROAT: 0
FEVER: 0
HEMATURIA: 0
ABDOMINAL PAIN: 0
DIARRHEA: 0
DYSURIA: 0
PALPITATIONS: 0
PARESTHESIAS: 0
NAUSEA: 0
NERVOUS/ANXIOUS: 0
COUGH: 0
HEARTBURN: 0
DIZZINESS: 0
CONSTIPATION: 0
JOINT SWELLING: 0
MYALGIAS: 0
ARTHRALGIAS: 0

## 2022-12-15 NOTE — PROGRESS NOTES
SUBJECTIVE:   CC: Gonzalez is an 62 year old who presents for preventative health visit.   Patient has been advised of split billing requirements and indicates understanding: Yes  Healthy Habits:     Getting at least 3 servings of Calcium per day:  NO    Bi-annual eye exam:  Yes    Dental care twice a year:  Yes    Sleep apnea or symptoms of sleep apnea:  Sleep apnea    Diet:  Regular (no restrictions)    Frequency of exercise:  None    Taking medications regularly:  Yes    Medication side effects:  None    PHQ-2 Total Score: 0    Additional concerns today:  No    Right forefoot Pain for 1 year    Hyperlipidemia Follow-Up      Are you regularly taking any medication or supplement to lower your cholesterol?   Yes- Simvastatin 20mg    Are you having muscle aches or other side effects that you think could be caused by your cholesterol lowering medication?  No      Today's PHQ-2 Score:   PHQ-2 ( 1999 Pfizer) 12/14/2022   Q1: Little interest or pleasure in doing things 0   Q2: Feeling down, depressed or hopeless 0   PHQ-2 Score 0   PHQ-2 Total Score (12-17 Years)- Positive if 3 or more points; Administer PHQ-A if positive -   Q1: Little interest or pleasure in doing things Not at all   Q2: Feeling down, depressed or hopeless Not at all   PHQ-2 Score 0       Have you ever done Advance Care Planning? (For example, a Health Directive, POLST, or a discussion with a medical provider or your loved ones about your wishes): No, advance care planning information given to patient to review.  Patient declined advance care planning discussion at this time.    Social History     Tobacco Use     Smoking status: Every Day     Packs/day: 0.80     Years: 25.00     Pack years: 20.00     Types: Cigarettes     Smokeless tobacco: Former     Quit date: 4/30/2010     Tobacco comments:     quit 2010- restarted 2/2018   Substance Use Topics     Alcohol use: Yes     Alcohol/week: 10.0 standard drinks     Comment: 3 x week     If you drink alcohol do  you typically have >3 drinks per day or >7 drinks per week? Yes        AUDIT - Alcohol Use Disorders Identification Test - Reproduced from the World Health Organization Audit 2001 (Second Edition) 12/14/2022   1.  How often do you have a drink containing alcohol? 2 to 3 times a week   2.  How many drinks containing alcohol do you have on a typical day when you are drinking? 3 or 4   3.  How often do you have five or more drinks on one occasion? Monthly   4.  How often during the last year have you found that you were not able to stop drinking once you had started? Never   5.  How often during the last year have you failed to do what was normally expected of you because of drinking? Never   6.  How often during the last year have you needed a first drink in the morning to get yourself going after a heavy drinking session? Never   7.  How often during the last year have you had a feeling of guilt or remorse after drinking? Never   8.  How often during the last year have you been unable to remember what happened the night before because of your drinking? Never   9.  Have you or someone else been injured because of your drinking? No   10. Has a relative, friend, doctor or other health care worker been concerned about your drinking or suggested you cut down? No   TOTAL SCORE 6       Last PSA:   PSA   Date Value Ref Range Status   12/14/2020 1.39 0 - 4 ug/L Final     Comment:     Assay Method:  Chemiluminescence using Siemens Vista analyzer     Prostate Specific Antigen Screen   Date Value Ref Range Status   12/16/2021 1.11 0.00 - 4.00 ug/L Final       Reviewed orders with patient. Reviewed health maintenance and updated orders accordingly - Yes      Reviewed and updated as needed this visit by clinical staff   Tobacco  Allergies  Meds  Problems  Med Hx  Surg Hx  Fam Hx          Reviewed and updated as needed this visit by Provider   Tobacco  Allergies  Meds  Problems  Med Hx  Surg Hx  Fam Hx        "      Review of Systems   Constitutional: Negative for chills and fever.   HENT: Negative for congestion, ear pain, hearing loss and sore throat.    Eyes: Negative for pain and visual disturbance.   Respiratory: Negative for cough and shortness of breath.    Cardiovascular: Negative for chest pain, palpitations and peripheral edema.   Gastrointestinal: Negative for abdominal pain, constipation, diarrhea, heartburn, hematochezia and nausea.   Genitourinary: Positive for impotence and urgency. Negative for dysuria, frequency, genital sores, hematuria and penile discharge.   Musculoskeletal: Negative for arthralgias, joint swelling and myalgias.   Skin: Negative for rash.   Neurological: Negative for dizziness, weakness, headaches and paresthesias.   Psychiatric/Behavioral: Negative for mood changes. The patient is not nervous/anxious.        OBJECTIVE:   /74   Pulse 78   Temp (!) 96.1  F (35.6  C) (Tympanic)   Resp 16   Ht 1.905 m (6' 3\")   Wt 115.3 kg (254 lb 3.2 oz)   SpO2 96%   BMI 31.77 kg/m    EXAM:  GENERAL: healthy, alert and no distress  EYES: Eyes grossly normal to inspection, PERRL and conjunctivae and sclerae normal  HENT: ear canals and TM's normal, nose and mouth without ulcers or lesions  NECK: no adenopathy, no asymmetry, masses, or scars and thyroid normal to palpation  RESP: lungs clear to auscultation - no rales, rhonchi or wheezes  BREAST: normal without masses, tenderness or nipple discharge and no palpable axillary masses or adenopathy  CV: regular rate and rhythm, normal S1 S2, no S3 or S4, no murmur, click or rub, no peripheral edema and peripheral pulses strong  ABDOMEN: soft, nontender, no hepatosplenomegaly, no masses and bowel sounds normal   (male): normal male genitalia without lesions or urethral discharge, no hernia  MS: no gross musculoskeletal defects noted, no edema  SKIN: no suspicious lesions or rashes  NEURO: Normal strength and tone, mentation intact and speech " "normal  PSYCH: mentation appears normal, affect normal/bright  LYMPH: no cervical, supraclavicular, axillary, or inguinal adenopathy  RECTAL: declined exam        ASSESSMENT/PLAN:   Routine general medical examination at a health care facility      Hyperlipidemia LDL goal <100  Controlled - continue medication(s).  - COMPREHENSIVE METABOLIC PANEL  - Lipid panel reflex to direct LDL Non-fasting  - simvastatin (ZOCOR) 20 MG tablet  Dispense: 90 tablet; Refill: 3    Erectile dysfunction, unspecified erectile dysfunction type  Medications helpful and will continue:  - sildenafil (VIAGRA) 50 MG tablet  Dispense: 30 tablet; Refill: 11    Screening for prostate cancer  - PROSTATE SPEC ANTIGEN SCREEN    Screen for colon cancer  Has upcoming colonoscopy already scheduled    Gastroesophageal reflux disease without esophagitis  Ongoing symptoms and that helps.  Could quit smoking which would help as well.  - CBC with Platelets  - pantoprazole (PROTONIX) 40 MG EC tablet  Dispense: 90 tablet; Refill: 3    Onychomycosis  Some improvement with course of oral med and will retreat with different protocol of 1 month on 1 month off 1 month on  - terbinafine (LAMISIL) 250 MG tablet  Dispense: 60 tablet; Refill: 0    Personal history of tobacco use  Recommended quitting, due for his CT scan  - Prof fee: Shared Decision Making for Lung Cancer Screening  - CT Chest Lung Cancer Scrn Low Dose wo    Screening for diabetes mellitus  - COMPREHENSIVE METABOLIC PANEL    Right foot pain  Persistent foot pain for a year and would like to see podiatry:  - Orthopedic  Referral      COUNSELING:  Reviewed preventive health counseling, as reflected in patient instructions      Estimated body mass index is 31.77 kg/m  as calculated from the following:    Height as of this encounter: 1.905 m (6' 3\").    Weight as of this encounter: 115.3 kg (254 lb 3.2 oz).  Weight management plan: Discussed healthy diet and exercise guidelines     reports that " he has been smoking cigarettes. He has a 20.00 pack-year smoking history. He quit smokeless tobacco use about 12 years ago.  Tobacco Cessation Action Plan: Information offered: Patient not interested at this time    Return in about 1 year (around 12/15/2023) for wellness exam with fasting labs with Oracio Yuen MD.         Oracio Yuen MD     [address] [address]  [email or web address]     Office: [phone]     Lung Cancer Screening Shared Decision Making Visit     Gonzalez Beard, a 62 year old male, is eligible for lung cancer screening    History   Smoking Status     Every Day     Packs/day: 0.80     Years: 25.00     Types: Cigarettes   Smokeless Tobacco     Former     Quit date: 4/30/2010       I have discussed with patient the risks and benefits of screening for lung cancer with low-dose CT.     The risks include:    radiation exposure: one low dose chest CT has as much ionizing radiation as about 15 chest x-rays, or 6 months of background radiation living in [place]      false positives: most findings/nodules are NOT cancer, but some might still require additional diagnostic evaluation, including biopsy    over-diagnosis: some slow growing cancers that might never have been clinically significant will be detected and treated unnecessarily     The benefit of early detection of lung cancer is contingent upon adherence to annual screening or more frequent follow up if indicated.     Furthermore, to benefit from screening, Gonzalez must be willing and able to undergo diagnostic procedures, if indicated. Although no specific guide is available for determining severity of comorbidities, it is reasonable to withhold screening in patients who have greater mortality risk from other diseases.     We did discuss that the best way to prevent lung cancer is to not smoke.    Some patients may value a numeric estimation of lung cancer risk when evaluating if lung cancer  screening is right for them, here is one calculator:    ShouldIScreen

## 2022-12-15 NOTE — PATIENT INSTRUCTIONS
Preventive Health Recommendations  Male Ages 50 - 64    Yearly exam:             See your health care provider every year in order to  o   Review health changes.   o   Discuss preventive care.    o   Review your medicines if your doctor has prescribed any.     Have a cholesterol test every 5 years, or more frequently if you are at risk for high cholesterol/heart disease.     Have a diabetes test (fasting glucose) every three years. If you are at risk for diabetes, you should have this test more often.     Have a colonoscopy at age 50, or have a yearly FIT test (stool test). These exams will check for colon cancer.      Talk with your health care provider about whether or not a prostate cancer screening test (PSA) is right for you.    You should be tested each year for STDs (sexually transmitted diseases), if you re at risk.     Shots: Get a flu shot each year. Get a tetanus shot every 10 years.     Nutrition:    Eat at least 5 servings of fruits and vegetables daily.     Eat whole-grain bread, whole-wheat pasta and brown rice instead of white grains and rice.     Get adequate Calcium and Vitamin D.     Lifestyle    Exercise for at least 150 minutes a week (30 minutes a day, 5 days a week). This will help you control your weight and prevent disease.     Limit alcohol to one drink per day.     No smoking.     Wear sunscreen to prevent skin cancer.     See your dentist every six months for an exam and cleaning.     See your eye doctor every 1 to 2 years.    Lung Cancer Screening   Frequently Asked Questions  If you are at high-risk for lung cancer, getting screened with low-dose computed tomography (LDCT) every year can help save your life. This handout offers answers to some of the most common questions about lung cancer screening. If you have other questions, please call [phone] ([phone]).     What is it?  Lung cancer screening uses special X-ray technology to create an image of your lung tissue.  The exam is quick and easy and takes less than 10 seconds. We don t give you any medicine or use any needles. You can eat before and after the exam. You don t need to change your clothes as long as the clothing on your chest doesn t contain metal. But, you do need to be able to hold your breath for at least 6 seconds during the exam.    What is the goal of lung cancer screening?  The goal of lung cancer screening is to save lives. Many times, lung cancer is not found until a person starts having physical symptoms. Lung cancer screening can help detect lung cancer in the earliest stages when it may be easier to treat.    Who should be screened for lung cancer?  We suggest lung cancer screening for anyone who is at high-risk for lung cancer. You are in the high-risk group if you:      are between the ages of 55 and 79, and    have smoked at least 1 pack of cigarettes a day for 20 or more years, and    still smoke or have quit within the past 15 years.    However, if you have a new cough or shortness of breath, you should talk to your doctor before being screened.    Why does it matter if I have symptoms?  Certain symptoms can be a sign that you have a condition in your lungs that should be checked and treated by your doctor. These symptoms include fever, chest pain, a new or changing cough, shortness of breath that you have never felt before, coughing up blood or unexplained weight loss. Having any of these symptoms can greatly affect the results of lung cancer screening.       Should all smokers get an LDCT lung cancer screening exam?  It depends. Lung cancer screening is for a very specific group of men and women who have a history of heavy smoking over a long period of time (see  Who should be screened for lung cancer  above).  I am in the high-risk group, but have been diagnosed with cancer in the past. Is LDCT lung cancer screening right for me?  In some cases, you should not have LDCT lung screening, such as  when your doctor is already following your cancer with CT scan studies. Your doctor will help you decide if LDCT lung screening is right for you.  Do I need to have a screening exam every year?  Yes. If you are in the high-risk group described earlier, you should get an LDCT lung cancer screening exam every year until you are 79, or are no longer willing or able to undergo screening and possible procedures to diagnose and treat lung cancer.  How effective is LDCT at preventing death from lung cancer?  Studies have shown that LDCT lung cancer screening can lower the risk of death from lung cancer by 20 percent in people who are at high-risk.  What are the risks?  There are some risks and limitations of LDCT lung cancer screening. We want to make sure you understand the risks and benefits, so please let us know if you have any questions. Your doctor may want to talk with you more about these risks.    Radiation exposure: As with any exam that uses radiation, there is a very small increased risk of cancer. The amount of radiation in LDCT is small--about the same amount a person would get from a mammogram. Your doctor orders the exam when he or she feels the potential benefits outweigh the risks.    False negatives: No test is perfect, including LDCT. It is possible that you may have a medical condition, including lung cancer, that is not found during your exam. This is called a false negative result.    False positives and more testing: LDCT very often finds something in the lung that could be cancer, but in fact is not. This is called a false positive result. False positive tests often cause anxiety. To make sure these findings are not cancer, you may need to have more tests. These tests will be done only if you give us permission. Sometimes patients need a treatment that can have side effects, such as a biopsy. For more information on false positives, see  What can I expect from the results?     Findings not related  to lung cancer: Your LDCT exam also takes pictures of areas of your body next to your lungs. In a very small number of cases, the CT scan will show an abnormal finding in one of these areas, such as your kidneys, adrenal glands, liver or thyroid. This finding may not be serious, but you may need more tests. Your doctor can help you decide what other tests you may need, if any.  What can I expect from the results?  About 1 out of 4 LDCT exams will find something that may need more tests. Most of the time, these findings are lung nodules. Lung nodules are very small collections of tissue in the lung. These nodules are very common, and the vast majority--more than 97 percent--are not cancer (benign). Most are normal lymph nodes or small areas of scarring from past infections.  But, if a small lung nodule is found to be cancer, the cancer can be cured more than 90 percent of the time. To know if the nodule is cancer, we may need to get more images before your next yearly screening exam. If the nodule has suspicious features (for example, it is large, has an odd shape or grows over time), we will refer you to a specialist for further testing.  Will my doctor also get the results?  Yes. Your doctor will get a copy of your results.  Is it okay to keep smoking now that there s a cancer screening exam?  No. Tobacco is one of the strongest cancer-causing agents. It causes not only lung cancer, but other cancers and cardiovascular (heart) diseases as well. The damage caused by smoking builds over time. This means that the longer you smoke, the higher your risk of disease. While it is never too late to quit, the sooner you quit, the better.  Where can I find help to quit smoking?  The best way to prevent lung cancer is to stop smoking. If you have already quit smoking, congratulations and keep it up! For help on quitting smoking, please call [facility] at 6-989-QUIT-NOW (1-710.107.9577) or the [employer] at  1-947.605.8040 to find local resources near you.  One-on-one health coaching:  If you d prefer to work individually with a health care provider on tobacco cessation, we offer:      Medication Therapy Management:  Our specially trained pharmacists work closely with you and your doctor to help you quit smoking.  Call [phone] or [phone] (toll free).

## 2022-12-17 NOTE — RESULT ENCOUNTER NOTE
Dear Gonzalez,    Here is a summary of your recent test results:  -Normal red blood cell (hgb) levels, normal white blood cell count and normal platelet levels.  -PSA (prostate specific antigen) test is normal.  This indicates a low likelihood of prostate cancer.  ADVISE: rechecking this in 1 year.  -Cholesterol levels are at your goal levels.  ADVISE: continuing your medication, a regular exercise program with at least 150 minutes of aerobic exercise per week, and eating a low saturated fat/low carbohydrate diet.  Also, you should recheck this fasting cholesterol panel in 12 months.  -Liver and gallbladder tests are normal (ALT,AST, Alk phos, bilirubin), kidney function is normal (Cr, GFR), sodium is normal, potassium is normal, calcium is normal, glucose is normal.    For additional lab test information, [email or web address] is a very good reference.    In addition, here is a list of due or overdue Health Maintenance reminders:  Colorectal Cancer Screening due on 08/15/2022  LUNG CANCER SCREENING due on 12/28/2022    Please call us at [phone] (or use [employer]) to address the above recommendations if needed.           Thank you very much for trusting me and [facility].     Have a peaceful day.    Healthy regards,  Oracio Yuen MD

## 2022-12-20 ENCOUNTER — ANCILLARY PROCEDURE (OUTPATIENT)
Dept: GENERAL RADIOLOGY | Facility: CLINIC | Age: 62
End: 2022-12-20
Attending: PODIATRIST
Payer: COMMERCIAL

## 2022-12-20 ENCOUNTER — OFFICE VISIT (OUTPATIENT)
Dept: PODIATRY | Facility: CLINIC | Age: 62
End: 2022-12-20
Attending: FAMILY MEDICINE
Payer: COMMERCIAL

## 2022-12-20 VITALS — SYSTOLIC BLOOD PRESSURE: 118 MMHG | DIASTOLIC BLOOD PRESSURE: 78 MMHG | WEIGHT: 254 LBS | BODY MASS INDEX: 31.75 KG/M2

## 2022-12-20 DIAGNOSIS — M20.42 HAMMERTOES OF BOTH FEET: ICD-10-CM

## 2022-12-20 DIAGNOSIS — Q66.70 PES CAVUS: ICD-10-CM

## 2022-12-20 DIAGNOSIS — M79.671 RIGHT FOOT PAIN: Primary | ICD-10-CM

## 2022-12-20 DIAGNOSIS — M20.5X1 HALLUX LIMITUS, RIGHT: ICD-10-CM

## 2022-12-20 DIAGNOSIS — M20.41 HAMMERTOES OF BOTH FEET: ICD-10-CM

## 2022-12-20 DIAGNOSIS — G57.63 MORTON'S NEUROMA OF BOTH FEET: ICD-10-CM

## 2022-12-20 DIAGNOSIS — M79.671 RIGHT FOOT PAIN: ICD-10-CM

## 2022-12-20 DIAGNOSIS — M20.5X2 HALLUX LIMITUS, LEFT: ICD-10-CM

## 2022-12-20 PROCEDURE — 99243 OFF/OP CNSLTJ NEW/EST LOW 30: CPT | Performed by: PODIATRIST

## 2022-12-20 PROCEDURE — 73630 X-RAY EXAM OF FOOT: CPT | Mod: TC | Performed by: RADIOLOGY

## 2022-12-20 RX ORDER — DICLOFENAC SODIUM 75 MG/1
75 TABLET, DELAYED RELEASE ORAL 2 TIMES DAILY
Qty: 28 TABLET | Refills: 0 | Status: SHIPPED | OUTPATIENT
Start: 2022-12-20 | End: 2024-01-13

## 2022-12-20 NOTE — LETTER
12/20/2022         RE: Gonzalez Beard  [address]  [facility] [address]        Dear Colleague,    Thank you for referring your patient, Gonzalez Beard, to the [facility]. Please see a copy of my visit note below.    PATIENT HISTORY:  Dr. Chi requested I see this patient for their foot issue.  Gonzalez Beard is a 62 year old male who presents to clinic for pain to both feet.  Notes is an aching pain and states it 7 out of 10 at its worst.  Worse with walking.  Denies specific injury.  Has been going on for over a year.  Wondering what is causing the pain and what can be done for it.    Review of Systems:  Patient denies fever, chills, rash, wound, numbness, weakness, heart burn, blood in stool, chest pain with activity, calf pain when walking, shortness of breath with activity, chronic cough, easy bleeding/bruising, swelling of ankles, excessive thirst, fatigue, depression, anxiety.  Patient admits to pain at times, stiffness.     PAST MEDICAL HISTORY:   Past Medical History:   Diagnosis Date     Benign neoplasm of colon 05/2008    multiple with diverticulosis - adenomatous, hyperplastic     Diverticulitis 4/20/2014     Diverticulitis of colon 9/07, 1/17     Esophageal reflux 2005     Hyperlipidemia LDL goal <130 1997     Hyperplastic colon polyp 7/09    due 5 yrs     Inguinal hernia with obstruction, without mention of gangrene, unilateral or unspecified, (not specified as recurrent) 9/19/07    right detected on exam      Insomnia, unspecified     sleep issues - Dr Leonard     LUIS E (obstructive sleep apnea) 2018    [facility] - CPAP - 10 cm     RLS (restless legs syndrome) 2018     Shingles 4/14    rt face/ear     Stricture and stenosis of esophagus         PAST SURGICAL HISTORY:   Past Surgical History:   Procedure Laterality Date     COLECTOMY  09/2017    Dr Hayward, robotics assisted sigmoid colectomy     COLONOSCOPY  5/08, 7/09, 7/11, 8/17    multiple polyps with  diverticulosis - due 5 yr     ESOPHAGOSCOPY, GASTROSCOPY, DUODENOSCOPY (EGD), COMBINED N/A 01/08/2016    stricture with dilation     ESOPHAGOSCOPY, GASTROSCOPY, DUODENOSCOPY (EGD), COMBINED N/A 06/04/2018    esophageal stenosis, small hiatal hernia, dilated     ESOPHAGOSCOPY, GASTROSCOPY, DUODENOSCOPY (EGD), DILATATION, COMBINED N/A 06/04/2018    Procedure: COMBINED ESOPHAGOSCOPY, GASTROSCOPY, DUODENOSCOPY (EGD), DILATATION;  COMBINED ESOPHAGOSCOPY, GASTROSCOPY, DUODENOSCOPY (EGD) with biopsies and esophageal dilatation with savary wire 17  ;  Surgeon: Reginald Gay MD;  Location:  GI      ESOPHAGOSCOPY, DIAGNOSTIC  5/07, 2/09, 6/18    reactive gastropathy - with strictures dilated x 2  = 5/07, 6/15/07, 6/18 (stricture x 1, small hiatl hernia)      stress echo  09/2011    normal     UPPER GI ENDOSCOPY  6/15, 1/16, 6/18    stricture, food caught, [place] - 1/16 normal - 6/18 stricture x 1 dilated, small hiatal hernia        MEDICATIONS:   Current Outpatient Medications:      pantoprazole (PROTONIX) 40 MG EC tablet, Take 1 tablet (40 mg) by mouth daily, Disp: 90 tablet, Rfl: 3     sildenafil (VIAGRA) 50 MG tablet, Take 1-2 tablets ( mg) by mouth daily as needed (erectile dysfunction), Disp: 30 tablet, Rfl: 11     simvastatin (ZOCOR) 20 MG tablet, Take 1 tablet (20 mg) by mouth At Bedtime, Disp: 90 tablet, Rfl: 3     terbinafine (LAMISIL) 250 MG tablet, Take 1 tablet (250 mg) by mouth daily x 30days then hold 1 month then 30days, Disp: 60 tablet, Rfl: 0     terbinafine (LAMISIL) 250 MG tablet, Take 1 tablet (250 mg) by mouth daily for 7 days and repeat monthly for 4 cycles (Patient not taking: Reported on 12/15/2022), Disp: 7 tablet, Rfl: 0     ALLERGIES:  No Known Allergies     SOCIAL HISTORY:   Social History     Socioeconomic History     Marital status:      Spouse name: Sabrina     Number of children: 2     Years of education: 16     Highest education level: Not on file   Occupational  History     Occupation: OWNER     Comment: tires-owns shop     Employer: [employer]   Tobacco Use     Smoking status: Every Day     Packs/day: 0.80     Years: 25.00     Pack years: 20.00     Types: Cigarettes     Smokeless tobacco: Former     Quit date: 2010     Tobacco comments:     quit - restarted 2018   Substance and Sexual Activity     Alcohol use: Yes     Alcohol/week: 10.0 standard drinks     Comment: 3 x week     Drug use: Yes     Types: Marijuana     Comment: occ marijuana     Sexual activity: Not Currently     Partners: Female     Birth control/protection: Condom     Comment: would like to discuss   Other Topics Concern      Service Not Asked     Blood Transfusions Not Asked     Caffeine Concern Yes     Comment: 2 cups, <1 can qd     Occupational Exposure Not Asked     Hobby Hazards Not Asked     Sleep Concern Not Asked     Stress Concern Not Asked     Weight Concern Not Asked     Special Diet Not Asked     Back Care Not Asked     Exercise Yes     Comment: regularly exercising     Bike Helmet Not Asked     Seat Belt Yes     Self-Exams Not Asked     Parent/sibling w/ CABG, MI or angioplasty before 65F 55M? No   Social History Narrative     Not on file     Social Determinants of Health     Financial Resource Strain: Not on file   Food Insecurity: Not on file   Transportation Needs: Not on file   Physical Activity: Not on file   Stress: Not on file   Social Connections: Not on file   Intimate Partner Violence: Not on file   Housing Stability: Not on file        FAMILY HISTORY:   Family History   Problem Relation Age of Onset     No Known Problems Mother      Hypertension Father      Cerebrovascular Disease Father      Prostate Cancer Father      Coronary Artery Disease Father          at age 86     Prostate Cancer Brother      Coronary Artery Disease Brother         bypass x 5 - CHF     Diabetes Brother      Colon Cancer Brother      Coronary Artery Disease Paternal Grandfather       No Known Problems Son      No Known Problems Son      Breast Cancer No family hx of         EXAM:Vitals: /78   Wt 115.2 kg (254 lb)   BMI 31.75 kg/m    BMI= Body mass index is 31.75 kg/m .    General appearance: Patient is alert and fully cooperative with history & exam.  No sign of distress is noted during the visit.     Psychiatric: Affect is pleasant & appropriate.  Patient appears motivated to improve health.     Respiratory: Breathing is regular & unlabored while sitting.     HEENT: Hearing is intact to spoken word.  Speech is clear.  No gross evidence of visual impairment that would impact ambulation.     Dermatologic: Skin is intact to both lower extremities without significant lesions, rash or abrasion.  No paronychia or evidence of soft tissue infection is noted.     Vascular: DP & PT pulses are intact & regular bilaterally.  No significant edema or varicosities noted.  CFT and skin temperature is normal to both lower extremities.     Neurologic: Lower extremity sensation is intact to light touch.  No evidence of weakness or contracture in the lower extremities.  No evidence of neuropathy.     Musculoskeletal: Patient is ambulatory without assistive device or brace. Increased arch height.  Semirigid contracture of toes 2 through 4 bilaterally.  Pain on palpation of the third intermetatarsal space bilaterally left more than right.    Radiographs: bilateral foot xray -  I personally reviewed the xrays -increase calcaneal inclination angle.  No fractures are noted.  Degenerative changes to the first metatarsal phalangeal joints, right more than left.     ASSESSMENT:    Right foot pain  Pes cavus  Khan's neuroma of both feet  Hallux limitus, right  Hallux limitus, left  Hammertoes of both feet     Medical Decision Making/Plan:  Reviewed patient's chart in [facility].  Reviewed and discussed x-rays.  Reviewed and discussed causes of hallux limitus with patient.  Explained that it is a progressive arthritis  meaning that over time, there is decrease in the joint space as well as bony spurring that occurs which leads to pain in the big toe especially with bending motions of the big toe joint.    Discussed treatment options with patient including rigid soled shoes or orthotics that are stiff under the big toe that help to prevent motion at that joint which is leading to pain and inflammation.  We discussed that sometimes cortisone injections can help with the pain or physical therapy treatments such as ultrasound.  Discussed that this is normally a structural issue in the foot and if conservative therapy doesn't work, surgery is considered.    We discussed that depending on the quality of the cartilage and/or of the joint determines if patient will need a joint sparing or fusion procedure.  Discussed that this can usually not be determined by x-ray and is an intra- op position.  With joint sparing procedure, and implant is placed in the joint to try to help keep the range of motion at that the toe. Patient is normally minimally weight bearing in a cam boot for 3 weeks.  With fusion, patient is normally non weight bearing for 2 weeks, then minimal weight bearing for 4 weeks in boot.      We discussed causes and treatments of neuromas.  These included shoe gear, orthotics, metatarsal pads, cortisone injections, ice, physical therapy, and possible surgical removal.     At this time, recommend super feet inserts orange color talked about custom orthotics however he does not think his insurance covers these.  Recommend over-the-counter topical pain cream.  He was also given an order for stronger anti-inflammatory.  We will have him wear toe crest pads second and third toes to help decrease pressure to the ends of the toes.  If pain continues we can always try a cortisone injection.  All questions were answered to patient's satisfaction and he will call for the questions or concerns.    Patient risk factor: Patient is at low  risk for infection.        Cassi Parr DPM, Podiatry/Foot and Ankle Surgery        Again, thank you for allowing me to participate in the care of your patient.        Sincerely,        Cassi Parr DPM, Podiatry/Foot and Ankle Surgery

## 2022-12-20 NOTE — PROGRESS NOTES
PATIENT HISTORY:  Dr. Chi requested I see this patient for their foot issue.  Gonzalez Beard is a 62 year old male who presents to clinic for pain to both feet.  Notes is an aching pain and states it 7 out of 10 at its worst.  Worse with walking.  Denies specific injury.  Has been going on for over a year.  Wondering what is causing the pain and what can be done for it.    Review of Systems:  Patient denies fever, chills, rash, wound, numbness, weakness, heart burn, blood in stool, chest pain with activity, calf pain when walking, shortness of breath with activity, chronic cough, easy bleeding/bruising, swelling of ankles, excessive thirst, fatigue, depression, anxiety.  Patient admits to pain at times, stiffness.     PAST MEDICAL HISTORY:   Past Medical History:   Diagnosis Date     Benign neoplasm of colon 05/2008    multiple with diverticulosis - adenomatous, hyperplastic     Diverticulitis 4/20/2014     Diverticulitis of colon 9/07, 1/17     Esophageal reflux 2005     Hyperlipidemia LDL goal <130 1997     Hyperplastic colon polyp 7/09    due 5 yrs     Inguinal hernia with obstruction, without mention of gangrene, unilateral or unspecified, (not specified as recurrent) 9/19/07    right detected on exam      Insomnia, unspecified     sleep issues - Dr Leonard     LUIS E (obstructive sleep apnea) 2018    [facility] - CPAP - 10 cm     RLS (restless legs syndrome) 2018     Shingles 4/14    rt face/ear     Stricture and stenosis of esophagus         PAST SURGICAL HISTORY:   Past Surgical History:   Procedure Laterality Date     COLECTOMY  09/2017    Dr Hayward, robotics assisted sigmoid colectomy     COLONOSCOPY  5/08, 7/09, 7/11, 8/17    multiple polyps with diverticulosis - due 5 yr     ESOPHAGOSCOPY, GASTROSCOPY, DUODENOSCOPY (EGD), COMBINED N/A 01/08/2016    stricture with dilation     ESOPHAGOSCOPY, GASTROSCOPY, DUODENOSCOPY (EGD), COMBINED N/A 06/04/2018    esophageal stenosis, small hiatal hernia, dilated      ESOPHAGOSCOPY, GASTROSCOPY, DUODENOSCOPY (EGD), DILATATION, COMBINED N/A 06/04/2018    Procedure: COMBINED ESOPHAGOSCOPY, GASTROSCOPY, DUODENOSCOPY (EGD), DILATATION;  COMBINED ESOPHAGOSCOPY, GASTROSCOPY, DUODENOSCOPY (EGD) with biopsies and esophageal dilatation with savary wire 17  ;  Surgeon: Reginald Gay MD;  Location:  GI      ESOPHAGOSCOPY, DIAGNOSTIC  5/07, 2/09, 6/18    reactive gastropathy - with strictures dilated x 2  = 5/07, 6/15/07, 6/18 (stricture x 1, small hiatl hernia)      stress echo  09/2011    normal     UPPER GI ENDOSCOPY  6/15, 1/16, 6/18    stricture, food caught, [place] - 1/16 normal - 6/18 stricture x 1 dilated, small hiatal hernia        MEDICATIONS:   Current Outpatient Medications:      pantoprazole (PROTONIX) 40 MG EC tablet, Take 1 tablet (40 mg) by mouth daily, Disp: 90 tablet, Rfl: 3     sildenafil (VIAGRA) 50 MG tablet, Take 1-2 tablets ( mg) by mouth daily as needed (erectile dysfunction), Disp: 30 tablet, Rfl: 11     simvastatin (ZOCOR) 20 MG tablet, Take 1 tablet (20 mg) by mouth At Bedtime, Disp: 90 tablet, Rfl: 3     terbinafine (LAMISIL) 250 MG tablet, Take 1 tablet (250 mg) by mouth daily x 30days then hold 1 month then 30days, Disp: 60 tablet, Rfl: 0     terbinafine (LAMISIL) 250 MG tablet, Take 1 tablet (250 mg) by mouth daily for 7 days and repeat monthly for 4 cycles (Patient not taking: Reported on 12/15/2022), Disp: 7 tablet, Rfl: 0     ALLERGIES:  No Known Allergies     SOCIAL HISTORY:   Social History     Socioeconomic History     Marital status:      Spouse name: Sabrina     Number of children: 2     Years of education: 16     Highest education level: Not on file   Occupational History     Occupation: OWNER     Comment: [employer]     Employer: [email or web address]   Tobacco Use     Smoking status: Every Day     Packs/day: 0.80     Years: 25.00     Pack years: 20.00     Types: Cigarettes     Smokeless tobacco: Former     Quit date:  2010     Tobacco comments:     quit - restarted 2018   Substance and Sexual Activity     Alcohol use: Yes     Alcohol/week: 10.0 standard drinks     Comment: 3 x week     Drug use: Yes     Types: Marijuana     Comment: occ marijuana     Sexual activity: Not Currently     Partners: Female     Birth control/protection: Condom     Comment: would like to discuss   Other Topics Concern      Service Not Asked     Blood Transfusions Not Asked     Caffeine Concern Yes     Comment: 2 cups, <1 can qd     Occupational Exposure Not Asked     Hobby Hazards Not Asked     Sleep Concern Not Asked     Stress Concern Not Asked     Weight Concern Not Asked     Special Diet Not Asked     Back Care Not Asked     Exercise Yes     Comment: regularly exercising     Bike Helmet Not Asked     Seat Belt Yes     Self-Exams Not Asked     Parent/sibling w/ CABG, MI or angioplasty before 65F 55M? No   Social History Narrative     Not on file     Social Determinants of Health     Financial Resource Strain: Not on file   Food Insecurity: Not on file   Transportation Needs: Not on file   Physical Activity: Not on file   Stress: Not on file   Social Connections: Not on file   Intimate Partner Violence: Not on file   Housing Stability: Not on file        FAMILY HISTORY:   Family History   Problem Relation Age of Onset     No Known Problems Mother      Hypertension Father      Cerebrovascular Disease Father      Prostate Cancer Father      Coronary Artery Disease Father          at age 86     Prostate Cancer Brother      Coronary Artery Disease Brother         bypass x 5 - CHF     Diabetes Brother      Colon Cancer Brother      Coronary Artery Disease Paternal Grandfather      No Known Problems Son      No Known Problems Son      Breast Cancer No family hx of         EXAM:Vitals: /78   Wt 115.2 kg (254 lb)   BMI 31.75 kg/m    BMI= Body mass index is 31.75 kg/m .    General appearance: Patient is alert and fully  cooperative with history & exam.  No sign of distress is noted during the visit.     Psychiatric: Affect is pleasant & appropriate.  Patient appears motivated to improve health.     Respiratory: Breathing is regular & unlabored while sitting.     HEENT: Hearing is intact to spoken word.  Speech is clear.  No gross evidence of visual impairment that would impact ambulation.     Dermatologic: Skin is intact to both lower extremities without significant lesions, rash or abrasion.  No paronychia or evidence of soft tissue infection is noted.     Vascular: DP & PT pulses are intact & regular bilaterally.  No significant edema or varicosities noted.  CFT and skin temperature is normal to both lower extremities.     Neurologic: Lower extremity sensation is intact to light touch.  No evidence of weakness or contracture in the lower extremities.  No evidence of neuropathy.     Musculoskeletal: Patient is ambulatory without assistive device or brace. Increased arch height.  Semirigid contracture of toes 2 through 4 bilaterally.  Pain on palpation of the third intermetatarsal space bilaterally left more than right.    Radiographs: bilateral foot xray -  I personally reviewed the xrays -increase calcaneal inclination angle.  No fractures are noted.  Degenerative changes to the first metatarsal phalangeal joints, right more than left.     ASSESSMENT:    Right foot pain  Pes cavus  Khan's neuroma of both feet  Hallux limitus, right  Hallux limitus, left  Hammertoes of both feet     Medical Decision Making/Plan:  Reviewed patient's chart in [facility].  Reviewed and discussed x-rays.  Reviewed and discussed causes of hallux limitus with patient.  Explained that it is a progressive arthritis meaning that over time, there is decrease in the joint space as well as bony spurring that occurs which leads to pain in the big toe especially with bending motions of the big toe joint.    Discussed treatment options with patient including rigid  soled shoes or orthotics that are stiff under the big toe that help to prevent motion at that joint which is leading to pain and inflammation.  We discussed that sometimes cortisone injections can help with the pain or physical therapy treatments such as ultrasound.  Discussed that this is normally a structural issue in the foot and if conservative therapy doesn't work, surgery is considered.    We discussed that depending on the quality of the cartilage and/or of the joint determines if patient will need a joint sparing or fusion procedure.  Discussed that this can usually not be determined by x-ray and is an intra- op position.  With joint sparing procedure, and implant is placed in the joint to try to help keep the range of motion at that the toe. Patient is normally minimally weight bearing in a cam boot for 3 weeks.  With fusion, patient is normally non weight bearing for 2 weeks, then minimal weight bearing for 4 weeks in boot.      We discussed causes and treatments of neuromas.  These included shoe gear, orthotics, metatarsal pads, cortisone injections, ice, physical therapy, and possible surgical removal.     At this time, recommend super feet inserts orange color talked about custom orthotics however he does not think his insurance covers these.  Recommend over-the-counter topical pain cream.  He was also given an order for stronger anti-inflammatory.  We will have him wear toe crest pads second and third toes to help decrease pressure to the ends of the toes.  If pain continues we can always try a cortisone injection.  All questions were answered to patient's satisfaction and he will call for the questions or concerns.    Patient risk factor: Patient is at low risk for infection.        Cassi Parr DPM, Podiatry/Foot and Ankle Surgery

## 2022-12-20 NOTE — PATIENT INSTRUCTIONS
Thank you for choosing [facility] Podiatry / Foot & Ankle Surgery!    DR LANDIN'S CLINIC:  [facility]   [id number]   [place] [address]      TRIAGE LINE: [phone]  APPOINTMENTS: [phone]  RADIOLOGY: [phone]  SET UP SURGERY: [phone]  FAX NUMBER: [phone]  BILLING QUESTIONS: [phone]       Follow up: as needed.       Recommend super feet inserts orange color.  You can get these at [employer], [employer] or online.    Recommend over-the-counter Voltaren gel as needed for pain to the feet.    KHAN'S NEUROMA   Khan's neuroma is an enlargement or thickening of a nerve in the foot. It is also sometimes referred to as an intermetatarsal neuroma, interdigital neuroma, Khan's metatarsalgia (pain in the metatarsal head area), katarina-neural fibrosis (scar tissue around a nerve) or entrapment neuropathy (abnormal nerve due to compression). A Khan's neuroma most commonly occurs in the third interspace between the third and fourth toes, followed by the second interspace between the second and third toes. Khan's neuromas have also occurred in the fourth and first interspaces, but these are rare. If you have a Khan's neuroma, there is a 15% chance it will occur bilaterally (on both feet). Khan's neuromas occur most commonly in women who are between 30 to 50 years old. The reason they are more common in women is thought to be due to the shoes women wear.   CAUSES: A Khan's neuroma is thought to be caused by trauma to the nerve, but scientists are still not sure about the exact cause of the trauma. The trauma may be caused by the metatarsal heads, the deep transverse intermetatarsal ligament (holds the metatarsal heads together) or an intermetatarsal bursa (fluid-filled sac). All of these structures can cause compression/trauma on the nerve which initially causes swelling and injury in the nerve. Over time if the compression/trauma continues,  "the nerve repairs itself with very fibrous tissue that leads to enlargement and thickening of the nerve. Other causes of trauma to the nerve may include; overpronation (foot rolls inward), hypermobility (too much motion), cavo varus (high arch foot) and excessive dorsiflexion (toes bend upward) of the toes. These biomechanical (howthe foot moves) factors may cause trauma to the nerve with every step. If the nerve becomes irritated and enlarged then it takes up more space and gets even more compressed and irritated. It becomes a vicious cycle.   SIGNS & SYMPTOMS  - Pain (sharp, stabbing, throbbing, shooting)   - Numbness   - Tingling or \"pins & needles\"   - Burning   - Cramping   - Feeling that you are stepping on something or that something is in your shoe   - Initially the symptoms may happen once in a while, but as the condition gets worse, the symptoms may happen all of the time   - It usually feels better by taking off your shoe and massaging your foot     DIAGNOSIS: Your podiatrist will ask many questions about your signs and symptoms and will perform a physical exam. Some of the exams may include a web space compression test. This is done by squeezing the metatarsals together with one hand and using the thumb and index finger of the other hand to compress the affected web space to reproduce the pain/symptoms. A palpable click (Amelia's click) is usually present. This test may also cause pain to shoot into the toes and that is called a Tinel's sign. Poncho's test involves squeezing the metatarsals together and moving the toes up and down for 30 seconds. This will usually cause pain or it will bring on your other symptoms. Taylor's sign is positive when you stand and the affected toes spread apart. A Khan's neuroma is usually diagnosed based on the history and physical exam findings, but sometimes other tests such as an x-ray, ultrasound or an MRI are needed.   TREATMENT  1.  Footwear Changes: Wear shoes " that are wide and deep in the toe box so they  do not put pressure on your toes and metatarsals. Avoid wearing high heels because they cause increased pressure on the ball of your foot (forefoot).    2.  Metatarsal Pads: These help to lift and separate the metatarsal heads to take pressure off of the nerve. They are placed just behind where you feel the pain, not on top of the painful spot.   3.  Activity modification: For example, you may try swimming instead of running until your symptoms go away.   4.  Taping   5.  Icing   6.  NSAIDs (anti-inflammatories): aleve, ibuprofen, etc.   7.  Arch Supports or Orthotics: These help to control some of the abnormal motion in your feet. The abnormal motion can lead to extra torque and pressure on the nerve.   8.  Physical Therapy  9.  Cortisone Injection: Helps to decrease the size of the irritated, enlarged nerve.   10.  Sclerosing Alcohol Injection: Helps to destroy the nerve chemically, which causes permanent numbness    SURGERY  If conservative treatment does not help surgery may be needed. Surgery may involve cutting out the nerve or cutting the intermetatarsalligament. Studies have shown surgery has an 80-85% success rate.  Will result in numbness    PREVENTION  -Avoid wearing narrow, pointed toe shoes, or high heels    Foot and Ankle Hammertoe Post Operative Instructions   Activities:  First day of surgery you need to rest.  Stay off your feet as much as possible and keep your foot elevated above the level of your heart (about 2 pillow height).  Elevate foot 23 of 24 hours a day.  Wear your surgical shoe at all times when up.  Limit walking to 5 to 10 minutes on your heel per hour over the next few days if your doctor has previously told you that you can put some weight on the foot after surgery.  If you are suppose to be non weight bearing, which means NO WEIGHT AT ALL ON THE FOOT.  Use an ice pack on the ankle 20-30 minutes per hour to help decrease pain and  swelling.  The first two weeks you need to ice and elevate as much as possible.  This will help with post op pain.  Your foot requires significant rest and elevation. Expect muscle aches, back pain, cramps, etc. Optimal posture, lumbar support, back exercises, ice and heat may all help with your new aches and pains. Do not apply a heating pad to your foot or leg as this can cause increase swelling and pain. Rather use ice in those areas.   Showering is a major challenge. Your incision requires about three days to become sealed from water. Your bandage should not get wet and should not be removed. Do not attempt showering for the first three days. A sponge bath is preferred. You may attempt to shower on the fourth day after the operation. Your foot should be covered with a bag, tape and rubber bands. Double bagging is preferred. Standing in the shower with a bag on your foot is quite hazardous. A portable shower stool would be ideal. The bandage will need to be changed in the office if it becomes moistened. A moist bandage will not dry on its own. A moist dressing may lead to infection.      External pins need continued protection. These pins are strong but do not resist the forces of bumping. Pay attention to the position and direction of the pins. Any change in pin alignment or positioning should prompt a call. A loose pin will need to be removed. Never push a pin back into your foot.    .   Do not wear regular shoes with a surgical bandage and/or external pins in your foot.  Wear loose fitting clothing that easily will slip over the bandage and/or pins.  Also, remember that dogs are not aware of your surgery.  Please keep them away from the bandages and pins.   If your surgeon places external pins in your foot, you must keep the foot dry until the pins are removed at 6-8 weeks.  Pins should be covered with a dressing for protection.  Check for any spreading redness or yellow drainage from the pins.  Do not apply  "ointment around the pins.  Do not push a loose pin back into the foot.  Please call the clinic if the pin is spinning or moving in and out.  If the pins are bumped or loosened, they may need to be removed early.  This may affect your surgical outcome.    Best wishes on your recovery from surgery.  Please do not hesitate to call or  My Chart  with any questions or concerns.    DEGENERATIVE ARTHRITIS OF THE BIG TOE JOINT   (hallux limitus/hallux rigidus)   Arthritis of the joint at the base of the big toe (metatarsophalangeal joint) has several causes. Usually it results from repetitive trauma to the joint, secondary to abnormal foot mechanics. Often it is hereditary. However, a one-time traumatic event can lead to arthritis. The condition doesn't improve with time, and even with treatment, can worsen. The cartilage wears out, joint surfaces are no longer smooth, bone rubs on bone, inflammation occurs with pain, and eventually bone spurs and loose fragments might develop.   The joint is often painful with activity, worse with flimsy shoes or walking barefoot, and it slowly progresses over time. A person might notice the toe \"locking up\" with walking. There often is an obvious, and irritating, bony bump on top of the foot. Shoes might be uncomfortable. In some people the pain is so bothersome that recreational activities sometimes even normal daily activities are difficult to perform.   The pain from this arthritis is likely a combination of joint jamming, cartilage loss and inflammation, and irritation from shoes rubbing on the bump. Sometimes other parts of the foot, leg, or back hurt from altering one's walk to compensate for the painful joint.     Ways to help a person live with the discomfort include wearing a good, supportive shoe with a rigid, rocker-type bottom. An example is a hiking boot. A rigid sole minimizes bending of the joint, and therefore, joint motion and pain. Shoes with a high toe box allow for " less rubbing on the bump. Avoiding barefoot walking, sandals, flip-flops and slippers usually helps.   Sometimes an insert or orthotic provides symptom relief. This might make shoe fit more difficult. Pads over the bump and occasionally injections into the joint provide relief.   Surgery for this condition is aimed towards alleviating pain. It does not cure the arthritis nor does it guarantee better joint motion. Depending on the condition of the metatarsophalangeal joint, there are several surgiqal options:    1.  Cutting off the bony bump(s) and cleaning the joint    2.  Loosening the joint up by making cuts in the first metatarsal bone or the big toe bone and removing a small section of bone.    3.  Repositioning bone to minimize jamming of the joint.    4.  In severe cases, the joint is fused. By fusing the joint, it will never bend again. This resolves the pain, because it's the movement of a worn out joint that causes pain. Oftentimes the operation involves a combination of these procedures and. requires the use of screws, pins, and/or a small surgical plate.     Healing after surgery requires about six weeks of protection. This allows the bone to heal. Maximum recovery takes about one year. The scar tissue and joint structures require this amount of time to finish the healing process. Expect stiffness, swelling and numbness during that time frame.   Surgery for arthritis of the metatarsophalangeal joint does involve side effects. Some side effects are predictable and others are less common but do occur. A scar will be visible and could be irritated by shoes. The shoe may rub on the screw or internal pin requiring surgical removal of these fixation devices. The screw and pin would likely be left in place for a full year. The first toe may remain stiff after surgery. The amount of stiffness is variable. Most people never regain normal motion of the first toe. This is due to scar tissue inherent to any surgery,  in addition to the cumUlative effects of arthritis. Sometimes the big toe drifts to one side or the other. Joint fusion is one option to correct an unstable, drifting toe. This procedure straightens the toe, however, no motion remains.   All surgical procedures involve risk of infection, numbness, pain, delayed bone healing, osteotomy (bone cut) dislocation, blood clots, continued foot pain, etc. Arthritic joint surgery is quite complex and should not be taken lightly.    Any skin incision can lead to infection. Deep infection might involve the bone and thus repeat surgery and six weeks of IV antibiotics. Scar tissue can cause nerve pain or numbness. his is generally temporary but can be permanent. We do not have treatments that cure nerve problems. Second toe pain could be related to altered mechanics and pressure transferred to the second toe. Delayed bone healing would lengthen the healing time. Some bones simply do not heal. This requires repeat surgery, electronic bone stimulation and/or extended protection. Smokers have an approximate 20% chance of poor bone healing. This is double that of a non-smoker. The bone cut may displace. This may need to be repaired with a second operation. Displacement can cause joint malalignment. Immobility after surgery can cause a blood clot in the legs and lungs. This could result in death.   Foot pain is complex. Most feet hurt for more than one reason. Operating on the arthritic   big toe joint will not necessarily create a pain free foot. Appropriate shoes, healthy body weight, avoidance of bare foot walking and moderation of activity will always be   necessary to enjoy foot comfort. Arthritis is incurable even with surgery.     Surgery for this type of arthritis is nevertheless quite successful. Most surgical patients are pleased with their foot following surgery. Many of the issues described above can be controlled by taking proper care of your foot during the healing  process.   Cosmetic bump surgery is discouraged for the reasons listed above. A bump and joint that is comfortable when wearing appropriate shoes should simply be treated with appropriate shoes.   Your surgeon would be happy to fully describe any of the above issues. You should pursue a full understanding of the operation, recovery process and any potential problems that could develop.     [email or web address] or call 7-118-PEDIFIX  HAMMERTOE PADS / TOE SPLINTS

## 2023-01-06 ENCOUNTER — HOSPITAL ENCOUNTER (OUTPATIENT)
Dept: CT IMAGING | Facility: CLINIC | Age: 63
Discharge: HOME | End: 2023-01-06
Attending: FAMILY MEDICINE | Admitting: FAMILY MEDICINE
Payer: COMMERCIAL

## 2023-01-06 DIAGNOSIS — Z87.891 PERSONAL HISTORY OF TOBACCO USE: ICD-10-CM

## 2023-01-06 PROCEDURE — 71271 CT THORAX LUNG CANCER SCR C-: CPT

## 2023-01-06 NOTE — RESULT ENCOUNTER NOTE
Dear Gonzalez,    Here is a summary of your recent test results:  -Lung CT did not show any signs of suspicious lung nodules. ADVISE: Rechecking a lung CT scan in 12 months.    For additional lab test information, www.testing.com is a very good reference.    In addition, here is a list of due or overdue Health Maintenance reminders:  Colorectal Cancer Screening due on 08/15/2022    Please call us at [phone] (or use [employer]) to address the above recommendations if needed.           Thank you very much for trusting me and [facility].     Have a peaceful day.    Healthy regards,  Oracio Yuen MD

## 2023-03-31 ENCOUNTER — MYC MEDICAL ADVICE (OUTPATIENT)
Dept: FAMILY MEDICINE | Facility: CLINIC | Age: 63
End: 2023-03-31
Payer: COMMERCIAL

## 2023-03-31 DIAGNOSIS — R13.10 DYSPHAGIA, UNSPECIFIED TYPE: Primary | ICD-10-CM

## 2023-10-09 ENCOUNTER — TRANSFERRED RECORDS (OUTPATIENT)
Dept: HEALTH INFORMATION MANAGEMENT | Facility: CLINIC | Age: 63
End: 2023-10-09
Payer: COMMERCIAL

## 2024-01-08 ENCOUNTER — TELEPHONE (OUTPATIENT)
Dept: GASTROENTEROLOGY | Facility: CLINIC | Age: 64
End: 2024-01-08
Payer: COMMERCIAL

## 2024-01-08 RX ORDER — CYCLOBENZAPRINE HCL 10 MG
20 TABLET ORAL 2 TIMES DAILY PRN
COMMUNITY
End: 2024-01-13

## 2024-01-08 NOTE — TELEPHONE ENCOUNTER
"Endoscopy Scheduling Screen    Have you had a positive Covid test in the last 14 days?  No    Are you active on MyChart?   Yes    What insurance is in the chart?  Other:  [facility]    Ordering/Referring Provider:   SHALINI PENN        (If ordering provider performs procedure, schedule with ordering provider unless otherwise instructed. )    BMI: Estimated body mass index is 31.75 kg/m  as calculated from the following:    Height as of 12/15/22: 1.905 m (6' 3\").    Weight as of 12/20/22: 115.2 kg (254 lb).     Sedation Ordered  moderate sedation.   If patient BMI > 50 do not schedule in [facility].    If patient BMI > 45 do not schedule at [facility].    Are you taking methadone or Suboxone?  No    Are you taking any prescription medications for pain 3 or more times per week?   NO - No RN review required.    Do you have a history of malignant hyperthermia or adverse reaction to anesthesia?  No    (Females) Are you currently pregnant?   No     Have you been diagnosed or told you have pulmonary hypertension?   No    Do you have an LVAD?  No    Have you been told you have moderate to severe sleep apnea?  Yes (RN Review required for scheduling unless scheduling in Hospital.)    Have you been told you have COPD, asthma, or any other lung disease?  No    Do you have any heart conditions?  No     Have you ever had an organ transplant?   No    Have you ever had or are you awaiting a heart or lung transplant?   No    Have you had a stroke or transient ischemic attack (TIA aka \"mini stroke\" in the last 6 months?   No    Have you been diagnosed with or been told you have cirrhosis of the liver?   No    Are you currently on dialysis?   No    Do you need assistance transferring?   No    BMI: Estimated body mass index is 31.75 kg/m  as calculated from the following:    Height as of 12/15/22: 1.905 m (6' 3\").    Weight as of 12/20/22: 115.2 kg (254 lb).     Is patients BMI > 40 and scheduling location [facility]?  No    Do you take an injectable " medication for weight loss or diabetes (excluding insulin)?  No    Do you take the medication Naltrexone?  No    Do you take blood thinners?  No       Prep   Are you currently on dialysis or do you have chronic kidney disease?  No    Do you have a diagnosis of diabetes?  No    Do you have a diagnosis of cystic fibrosis (CF)?  No    On a regular basis do you go 3 -5 days between bowel movements?  No    BMI > 40?  No    Preferred Pharmacy:    [facility]  [address] [address]  Phone: [phone] Fax: [phone]    [place], [place] - [address]  [address] [address]  Phone: [phone] Fax: [phone] Alternate Fax: [phone], [phone]    [place], [place] - [address]  [address]  [facility] [address]  Phone: [phone] Fax: [phone] Alternate Fax: [phone], [phone]      Final Scheduling Details   Colonoscopy prep sent?  N/A    Procedure scheduled  Upper endoscopy (EGD)    Surgeon:  Anaya     Date of procedure:  1/10/24     Pre-OP / PAC:   No - Not required for this site.    Location  [facility] - Patient preference.    Sedation   Moderate Sedation - Per order.      Patient Reminders:   You will receive a call from a Nurse to review instructions and health history.  This assessment must be completed prior to your procedure.  Failure to complete the Nurse assessment may result in the procedure being cancelled.      On the day of your procedure, please designate an adult(s) who can drive you home stay with you for the next 24 hours. The medicines used in the exam will make you sleepy. You will not be able to drive.      You cannot take public transportation, ride share services, or non-medical taxi service without a responsible caregiver.  Medical transport services are allowed with the requirement that a responsible caregiver will receive you at  your destination.  We require that drivers and caregivers are confirmed prior to your procedure.

## 2024-01-10 ENCOUNTER — HOSPITAL ENCOUNTER (OUTPATIENT)
Facility: CLINIC | Age: 64
Discharge: HOME | End: 2024-01-10
Attending: INTERNAL MEDICINE | Admitting: INTERNAL MEDICINE
Payer: COMMERCIAL

## 2024-01-10 VITALS
DIASTOLIC BLOOD PRESSURE: 80 MMHG | HEART RATE: 66 BPM | SYSTOLIC BLOOD PRESSURE: 146 MMHG | OXYGEN SATURATION: 94 % | RESPIRATION RATE: 16 BRPM

## 2024-01-10 LAB — UPPER GI ENDOSCOPY: NORMAL

## 2024-01-10 PROCEDURE — 88312 SPECIAL STAINS GROUP 1: CPT | Mod: 26 | Performed by: PATHOLOGY

## 2024-01-10 PROCEDURE — G0500 MOD SEDAT ENDO SERVICE >5YRS: HCPCS | Performed by: INTERNAL MEDICINE

## 2024-01-10 PROCEDURE — 43239 EGD BIOPSY SINGLE/MULTIPLE: CPT | Performed by: INTERNAL MEDICINE

## 2024-01-10 PROCEDURE — 88312 SPECIAL STAINS GROUP 1: CPT | Mod: TC | Performed by: INTERNAL MEDICINE

## 2024-01-10 PROCEDURE — 250N000009 HC RX 250: Performed by: INTERNAL MEDICINE

## 2024-01-10 PROCEDURE — 88305 TISSUE EXAM BY PATHOLOGIST: CPT | Mod: 26 | Performed by: PATHOLOGY

## 2024-01-10 PROCEDURE — 250N000011 HC RX IP 250 OP 636: Performed by: INTERNAL MEDICINE

## 2024-01-10 RX ORDER — EPINEPHRINE 1 MG/ML
0.1 INJECTION, SOLUTION INTRAMUSCULAR; SUBCUTANEOUS
Status: DISCONTINUED | OUTPATIENT
Start: 2024-01-10 | End: 2024-01-10 | Stop reason: HOSPADM

## 2024-01-10 RX ORDER — LIDOCAINE 40 MG/G
CREAM TOPICAL
Status: DISCONTINUED | OUTPATIENT
Start: 2024-01-10 | End: 2024-01-10 | Stop reason: HOSPADM

## 2024-01-10 RX ORDER — ONDANSETRON 2 MG/ML
4 INJECTION INTRAMUSCULAR; INTRAVENOUS EVERY 6 HOURS PRN
Status: CANCELLED | OUTPATIENT
Start: 2024-01-10

## 2024-01-10 RX ORDER — PROCHLORPERAZINE MALEATE 10 MG
10 TABLET ORAL EVERY 6 HOURS PRN
Status: CANCELLED | OUTPATIENT
Start: 2024-01-10

## 2024-01-10 RX ORDER — SIMETHICONE 40MG/0.6ML
133 SUSPENSION, DROPS(FINAL DOSAGE FORM)(ML) ORAL
Status: DISCONTINUED | OUTPATIENT
Start: 2024-01-10 | End: 2024-01-10 | Stop reason: HOSPADM

## 2024-01-10 RX ORDER — ONDANSETRON 2 MG/ML
4 INJECTION INTRAMUSCULAR; INTRAVENOUS
Status: DISCONTINUED | OUTPATIENT
Start: 2024-01-10 | End: 2024-01-10 | Stop reason: HOSPADM

## 2024-01-10 RX ORDER — NALOXONE HYDROCHLORIDE 0.4 MG/ML
0.4 INJECTION, SOLUTION INTRAMUSCULAR; INTRAVENOUS; SUBCUTANEOUS
Status: DISCONTINUED | OUTPATIENT
Start: 2024-01-10 | End: 2024-01-10 | Stop reason: HOSPADM

## 2024-01-10 RX ORDER — ASPIRIN 81 MG/1
81 TABLET ORAL ONCE
COMMUNITY

## 2024-01-10 RX ORDER — FLUMAZENIL 0.1 MG/ML
0.2 INJECTION, SOLUTION INTRAVENOUS
Status: DISCONTINUED | OUTPATIENT
Start: 2024-01-10 | End: 2024-01-10 | Stop reason: HOSPADM

## 2024-01-10 RX ORDER — FENTANYL CITRATE 50 UG/ML
50-100 INJECTION, SOLUTION INTRAMUSCULAR; INTRAVENOUS EVERY 5 MIN PRN
Status: DISCONTINUED | OUTPATIENT
Start: 2024-01-10 | End: 2024-01-10 | Stop reason: HOSPADM

## 2024-01-10 RX ORDER — NALOXONE HYDROCHLORIDE 0.4 MG/ML
0.2 INJECTION, SOLUTION INTRAMUSCULAR; INTRAVENOUS; SUBCUTANEOUS
Status: DISCONTINUED | OUTPATIENT
Start: 2024-01-10 | End: 2024-01-10 | Stop reason: HOSPADM

## 2024-01-10 RX ORDER — FLUMAZENIL 0.1 MG/ML
0.2 INJECTION, SOLUTION INTRAVENOUS
Status: CANCELLED | OUTPATIENT
Start: 2024-01-10 | End: 2024-01-11

## 2024-01-10 RX ORDER — ATROPINE SULFATE 0.1 MG/ML
1 INJECTION INTRAVENOUS
Status: DISCONTINUED | OUTPATIENT
Start: 2024-01-10 | End: 2024-01-10 | Stop reason: HOSPADM

## 2024-01-10 RX ORDER — ONDANSETRON 4 MG/1
4 TABLET, ORALLY DISINTEGRATING ORAL EVERY 6 HOURS PRN
Status: CANCELLED | OUTPATIENT
Start: 2024-01-10

## 2024-01-10 RX ORDER — DIPHENHYDRAMINE HYDROCHLORIDE 50 MG/ML
25-50 INJECTION INTRAMUSCULAR; INTRAVENOUS
Status: DISCONTINUED | OUTPATIENT
Start: 2024-01-10 | End: 2024-01-10 | Stop reason: HOSPADM

## 2024-01-10 RX ADMIN — TOPICAL ANESTHETIC 0.5 ML: 200 SPRAY DENTAL; PERIODONTAL at 12:34

## 2024-01-10 RX ADMIN — MIDAZOLAM HYDROCHLORIDE 2 MG: 1 INJECTION, SOLUTION INTRAMUSCULAR; INTRAVENOUS at 12:38

## 2024-01-10 RX ADMIN — FENTANYL CITRATE 100 MCG: 0.05 INJECTION, SOLUTION INTRAMUSCULAR; INTRAVENOUS at 12:32

## 2024-01-10 RX ADMIN — MIDAZOLAM HYDROCHLORIDE 2 MG: 1 INJECTION, SOLUTION INTRAMUSCULAR; INTRAVENOUS at 12:32

## 2024-01-10 RX ADMIN — FENTANYL CITRATE 100 MCG: 0.05 INJECTION, SOLUTION INTRAMUSCULAR; INTRAVENOUS at 12:38

## 2024-01-10 ASSESSMENT — ACTIVITIES OF DAILY LIVING (ADL): ADLS_ACUITY_SCORE: 35

## 2024-01-10 NOTE — H&P
Pre-Endoscopy History and Physical     Gonzalez Beard MRN# [id number]   YOB: 1960 Age: 63 year old     Date of Procedure: 1/10/2024  Primary care provider: Eulogio Yuen  Type of Endoscopy: Gastroscopy with possible biopsy, possible dilation  Reason for Procedure: dysphagia  Type of Anesthesia Anticipated: Conscious Sedation    HPI:    Gonzalez is a 63 year old male who will be undergoing the above procedure.      A history and physical has been performed. The patient's medications and allergies have been reviewed. The risks and benefits of the procedure and the sedation options and risks were discussed with the patient.  All questions were answered and informed consent was obtained.      He denies a personal or family history of anesthesia complications or bleeding disorders.     Patient Active Problem List   Diagnosis    Esophageal reflux    Hyperlipidemia LDL goal <100    Advanced directives, counseling/discussion    Primary insomnia    LUIS E (obstructive sleep apnea)    RLS (restless legs syndrome)    Erectile dysfunction, unspecified erectile dysfunction type        Past Medical History:   Diagnosis Date    Benign neoplasm of colon 05/2008    multiple with diverticulosis - adenomatous, hyperplastic    Diverticulitis 4/20/2014    Diverticulitis of colon 9/07, 1/17    Esophageal reflux 2005    Hyperlipidemia LDL goal <130 1997    Hyperplastic colon polyp 7/09    due 5 yrs    Inguinal hernia with obstruction, without mention of gangrene, unilateral or unspecified, (not specified as recurrent) 9/19/07    right detected on exam     Insomnia, unspecified     sleep issues - Dr Leonard    LUIS E (obstructive sleep apnea) 2018    [facility] - CPAP - 10 cm    RLS (restless legs syndrome) 2018    Shingles 4/14    rt face/ear    Stricture and stenosis of esophagus         Past Surgical History:   Procedure Laterality Date    COLECTOMY  09/2017    Dr Hayward, robotics assisted sigmoid colectomy    COLONOSCOPY  5/08,  , ,     multiple polyps with diverticulosis - due 5 yr    ESOPHAGOSCOPY, GASTROSCOPY, DUODENOSCOPY (EGD), COMBINED N/A 2016    stricture with dilation    ESOPHAGOSCOPY, GASTROSCOPY, DUODENOSCOPY (EGD), COMBINED N/A 2018    esophageal stenosis, small hiatal hernia, dilated    ESOPHAGOSCOPY, GASTROSCOPY, DUODENOSCOPY (EGD), DILATATION, COMBINED N/A 2018    Procedure: COMBINED ESOPHAGOSCOPY, GASTROSCOPY, DUODENOSCOPY (EGD), DILATATION;  COMBINED ESOPHAGOSCOPY, GASTROSCOPY, DUODENOSCOPY (EGD) with biopsies and esophageal dilatation with savary wire   ;  Surgeon: Reginald Gay MD;  Location: [facility] ESOPHAGOSCOPY, DIAGNOSTIC  , ,     reactive gastropathy - with strictures dilated x 2  = , 6/15/07,  (stricture x 1, small hiatl hernia)     stress echo  2011    normal    UPPER GI ENDOSCOPY  6/15, ,     stricture, food caught, [place] -  normal -  stricture x 1 dilated, small hiatal hernia       Social History     Tobacco Use    Smoking status: Every Day     Packs/day: 0.80     Years: 25.00     Additional pack years: 0.00     Total pack years: 20.00     Types: Cigarettes    Smokeless tobacco: Former     Quit date: 2010    Tobacco comments:     quit - restarted 2018   Substance Use Topics    Alcohol use: Yes     Alcohol/week: 10.0 standard drinks of alcohol     Comment: 3 x week       Family History   Problem Relation Age of Onset    No Known Problems Mother     Hypertension Father     Cerebrovascular Disease Father 65    Prostate Cancer Father 65    Coronary Artery Disease Father 60         at age 86    Prostate Cancer Brother 63    Coronary Artery Disease Brother 60        bypass x 5 - CHF    Diabetes Brother 50    Colon Cancer Brother 63    Coronary Artery Disease Paternal Grandfather     No Known Problems Son     No Known Problems Son     Breast Cancer No family hx of        Prior to Admission medications    Medication Sig  "Start Date End Date Taking? Authorizing Provider   cyclobenzaprine (FLEXERIL) 10 MG tablet Take 20 mg by mouth 2 times daily as needed for muscle spasms   Yes Reported, Patient   pantoprazole (PROTONIX) 40 MG EC tablet Take 1 tablet (40 mg) by mouth daily 12/15/22  Yes Eulogio Yuen MD   diclofenac (VOLTAREN) 75 MG EC tablet Take 1 tablet (75 mg) by mouth 2 times daily for 14 days 12/20/22 1/3/23  Cassi Parr DPM, Podiatry/Foot and Ankle Surgery   sildenafil (VIAGRA) 50 MG tablet Take 1-2 tablets ( mg) by mouth daily as needed (erectile dysfunction) 12/15/22   Eulogio Yuen MD   simvastatin (ZOCOR) 20 MG tablet Take 1 tablet (20 mg) by mouth At Bedtime 12/15/22   Eulogio Yuen MD   terbinafine (LAMISIL) 250 MG tablet Take 1 tablet (250 mg) by mouth daily x 30days then hold 1 month then 30days  Patient not taking: Reported on 1/8/2024 12/15/22   Eulogio Yuen MD   terbinafine (LAMISIL) 250 MG tablet Take 1 tablet (250 mg) by mouth daily for 7 days and repeat monthly for 4 cycles  Patient not taking: Reported on 12/15/2022 3/23/22   Eulogio Yuen MD       No Known Allergies     REVIEW OF SYSTEMS:   5 point ROS negative except as noted above in HPI, including Gen., Resp., CV, GI &  system review.    PHYSICAL EXAM:   There were no vitals taken for this visit. Estimated body mass index is 31.75 kg/m  as calculated from the following:    Height as of 12/15/22: 1.905 m (6' 3\").    Weight as of 12/20/22: 115.2 kg (254 lb).   GENERAL APPEARANCE: alert, and oriented  MENTAL STATUS: alert  AIRWAY EXAM: Mallampatti Class I (visualization of the soft palate, fauces, uvula, anterior and posterior pillars)  RESP: lungs clear to auscultation - no rales, rhonchi or wheezes  CV: regular rates and rhythm  DIAGNOSTICS:    Not indicated    IMPRESSION   ASA Class 2 - Mild systemic disease    PLAN:   Plan for Gastroscopy with possible biopsy, possible dilation. We discussed the risks, benefits and " alternatives and the patient wished to proceed.    The above has been forwarded to the consulting provider.      Signed Electronically by: Reginadl Julien MD  January 10, 2024

## 2024-01-12 ENCOUNTER — APPOINTMENT (OUTPATIENT)
Dept: MRI IMAGING | Facility: CLINIC | Age: 64
End: 2024-01-12
Attending: EMERGENCY MEDICINE
Payer: COMMERCIAL

## 2024-01-12 ENCOUNTER — HOSPITAL ENCOUNTER (OUTPATIENT)
Facility: CLINIC | Age: 64
Setting detail: OBSERVATION
Discharge: HOME | End: 2024-01-15
Attending: EMERGENCY MEDICINE | Admitting: INTERNAL MEDICINE
Payer: COMMERCIAL

## 2024-01-12 DIAGNOSIS — R11.2 NAUSEA AND VOMITING, UNSPECIFIED VOMITING TYPE: ICD-10-CM

## 2024-01-12 DIAGNOSIS — R42 VERTIGO: ICD-10-CM

## 2024-01-12 LAB
ALBUMIN SERPL BCG-MCNC: 4.8 G/DL (ref 3.5–5.2)
ALP SERPL-CCNC: 146 U/L (ref 40–150)
ALT SERPL W P-5'-P-CCNC: 27 U/L (ref 0–70)
ANION GAP SERPL CALCULATED.3IONS-SCNC: 16 MMOL/L (ref 7–15)
AST SERPL W P-5'-P-CCNC: 26 U/L (ref 0–45)
BASOPHILS # BLD AUTO: 0 10E3/UL (ref 0–0.2)
BASOPHILS NFR BLD AUTO: 0 %
BILIRUB SERPL-MCNC: 0.6 MG/DL
BUN SERPL-MCNC: 16.1 MG/DL (ref 8–23)
CALCIUM SERPL-MCNC: 9.6 MG/DL (ref 8.8–10.2)
CHLORIDE SERPL-SCNC: 101 MMOL/L (ref 98–107)
CREAT SERPL-MCNC: 0.93 MG/DL (ref 0.67–1.17)
DEPRECATED HCO3 PLAS-SCNC: 22 MMOL/L (ref 22–29)
EGFRCR SERPLBLD CKD-EPI 2021: >90 ML/MIN/1.73M2
EOSINOPHIL # BLD AUTO: 0 10E3/UL (ref 0–0.7)
EOSINOPHIL NFR BLD AUTO: 0 %
ERYTHROCYTE [DISTWIDTH] IN BLOOD BY AUTOMATED COUNT: 12.2 % (ref 10–15)
GLUCOSE SERPL-MCNC: 126 MG/DL (ref 70–99)
HCT VFR BLD AUTO: 51.2 % (ref 40–53)
HGB BLD-MCNC: 17.8 G/DL (ref 13.3–17.7)
IMM GRANULOCYTES # BLD: 0.1 10E3/UL
IMM GRANULOCYTES NFR BLD: 1 %
LYMPHOCYTES # BLD AUTO: 0.7 10E3/UL (ref 0.8–5.3)
LYMPHOCYTES NFR BLD AUTO: 7 %
MCH RBC QN AUTO: 29.1 PG (ref 26.5–33)
MCHC RBC AUTO-ENTMCNC: 34.8 G/DL (ref 31.5–36.5)
MCV RBC AUTO: 84 FL (ref 78–100)
MONOCYTES # BLD AUTO: 0.5 10E3/UL (ref 0–1.3)
MONOCYTES NFR BLD AUTO: 5 %
NEUTROPHILS # BLD AUTO: 8.8 10E3/UL (ref 1.6–8.3)
NEUTROPHILS NFR BLD AUTO: 87 %
NRBC # BLD AUTO: 0 10E3/UL
NRBC BLD AUTO-RTO: 0 /100
PATH REPORT.COMMENTS IMP SPEC: NORMAL
PATH REPORT.COMMENTS IMP SPEC: NORMAL
PATH REPORT.FINAL DX SPEC: NORMAL
PATH REPORT.GROSS SPEC: NORMAL
PATH REPORT.MICROSCOPIC SPEC OTHER STN: NORMAL
PATH REPORT.MICROSCOPIC SPEC OTHER STN: NORMAL
PATH REPORT.RELEVANT HX SPEC: NORMAL
PHOTO IMAGE: NORMAL
PLATELET # BLD AUTO: 263 10E3/UL (ref 150–450)
POTASSIUM SERPL-SCNC: 4.2 MMOL/L (ref 3.4–5.3)
PROT SERPL-MCNC: 8.2 G/DL (ref 6.4–8.3)
RBC # BLD AUTO: 6.12 10E6/UL (ref 4.4–5.9)
SODIUM SERPL-SCNC: 139 MMOL/L (ref 135–145)
WBC # BLD AUTO: 10.2 10E3/UL (ref 4–11)

## 2024-01-12 PROCEDURE — 250N000013 HC RX MED GY IP 250 OP 250 PS 637: Performed by: EMERGENCY MEDICINE

## 2024-01-12 PROCEDURE — 36415 COLL VENOUS BLD VENIPUNCTURE: CPT | Performed by: EMERGENCY MEDICINE

## 2024-01-12 PROCEDURE — 96375 TX/PRO/DX INJ NEW DRUG ADDON: CPT

## 2024-01-12 PROCEDURE — 258N000003 HC RX IP 258 OP 636: Performed by: EMERGENCY MEDICINE

## 2024-01-12 PROCEDURE — 85025 COMPLETE CBC W/AUTO DIFF WBC: CPT | Performed by: EMERGENCY MEDICINE

## 2024-01-12 PROCEDURE — 96374 THER/PROPH/DIAG INJ IV PUSH: CPT

## 2024-01-12 PROCEDURE — 96361 HYDRATE IV INFUSION ADD-ON: CPT

## 2024-01-12 PROCEDURE — 99285 EMERGENCY DEPT VISIT HI MDM: CPT | Mod: 25

## 2024-01-12 PROCEDURE — 80053 COMPREHEN METABOLIC PANEL: CPT | Performed by: EMERGENCY MEDICINE

## 2024-01-12 PROCEDURE — 70551 MRI BRAIN STEM W/O DYE: CPT

## 2024-01-12 PROCEDURE — 250N000009 HC RX 250: Performed by: EMERGENCY MEDICINE

## 2024-01-12 PROCEDURE — 82077 ASSAY SPEC XCP UR&BREATH IA: CPT | Performed by: INTERNAL MEDICINE

## 2024-01-12 PROCEDURE — 96376 TX/PRO/DX INJ SAME DRUG ADON: CPT

## 2024-01-12 PROCEDURE — 250N000011 HC RX IP 250 OP 636: Performed by: EMERGENCY MEDICINE

## 2024-01-12 RX ORDER — LORAZEPAM 2 MG/ML
1 INJECTION INTRAMUSCULAR ONCE
Status: COMPLETED | OUTPATIENT
Start: 2024-01-12 | End: 2024-01-12

## 2024-01-12 RX ORDER — SCOLOPAMINE TRANSDERMAL SYSTEM 1 MG/1
1 PATCH, EXTENDED RELEASE TRANSDERMAL
Status: DISCONTINUED | OUTPATIENT
Start: 2024-01-12 | End: 2024-01-13

## 2024-01-12 RX ORDER — MECLIZINE HYDROCHLORIDE 25 MG/1
25 TABLET ORAL ONCE
Status: COMPLETED | OUTPATIENT
Start: 2024-01-12 | End: 2024-01-12

## 2024-01-12 RX ORDER — OLANZAPINE 10 MG/1
5 INJECTION, POWDER, LYOPHILIZED, FOR SOLUTION INTRAMUSCULAR ONCE
Status: COMPLETED | OUTPATIENT
Start: 2024-01-12 | End: 2024-01-12

## 2024-01-12 RX ORDER — DIAZEPAM 2 MG
2 TABLET ORAL ONCE
Status: COMPLETED | OUTPATIENT
Start: 2024-01-12 | End: 2024-01-12

## 2024-01-12 RX ORDER — MECLIZINE HYDROCHLORIDE 25 MG/1
25-50 TABLET ORAL 4 TIMES DAILY PRN
Qty: 20 TABLET | Refills: 0 | Status: SHIPPED | OUTPATIENT
Start: 2024-01-12 | End: 2024-03-18

## 2024-01-12 RX ORDER — SCOLOPAMINE TRANSDERMAL SYSTEM 1 MG/1
1 PATCH, EXTENDED RELEASE TRANSDERMAL
Qty: 4 PATCH | Refills: 0 | Status: SHIPPED | OUTPATIENT
Start: 2024-01-12 | End: 2024-03-18

## 2024-01-12 RX ORDER — ONDANSETRON 2 MG/ML
4 INJECTION INTRAMUSCULAR; INTRAVENOUS ONCE
Status: COMPLETED | OUTPATIENT
Start: 2024-01-12 | End: 2024-01-12

## 2024-01-12 RX ORDER — ONDANSETRON 4 MG/1
4 TABLET, ORALLY DISINTEGRATING ORAL EVERY 6 HOURS PRN
Qty: 10 TABLET | Refills: 0 | Status: SHIPPED | OUTPATIENT
Start: 2024-01-12 | End: 2024-01-14

## 2024-01-12 RX ORDER — LOPERAMIDE HCL 2 MG
4 CAPSULE ORAL ONCE
Status: COMPLETED | OUTPATIENT
Start: 2024-01-12 | End: 2024-01-12

## 2024-01-12 RX ADMIN — SCOPALAMINE 1 PATCH: 1 PATCH, EXTENDED RELEASE TRANSDERMAL at 17:20

## 2024-01-12 RX ADMIN — LORAZEPAM 1 MG: 2 INJECTION INTRAMUSCULAR; INTRAVENOUS at 19:09

## 2024-01-12 RX ADMIN — OLANZAPINE 5 MG: 10 INJECTION, POWDER, FOR SOLUTION INTRAMUSCULAR at 20:58

## 2024-01-12 RX ADMIN — LOPERAMIDE HYDROCHLORIDE 4 MG: 2 CAPSULE ORAL at 23:50

## 2024-01-12 RX ADMIN — ONDANSETRON 4 MG: 2 INJECTION INTRAMUSCULAR; INTRAVENOUS at 17:14

## 2024-01-12 RX ADMIN — MECLIZINE HYDROCHLORIDE 25 MG: 25 TABLET ORAL at 18:21

## 2024-01-12 RX ADMIN — SODIUM CHLORIDE 1000 ML: 9 INJECTION, SOLUTION INTRAVENOUS at 22:40

## 2024-01-12 RX ADMIN — SODIUM CHLORIDE 1000 ML: 9 INJECTION, SOLUTION INTRAVENOUS at 17:17

## 2024-01-12 RX ADMIN — LORAZEPAM 1 MG: 2 INJECTION INTRAMUSCULAR; INTRAVENOUS at 20:24

## 2024-01-12 RX ADMIN — DIAZEPAM 2 MG: 2 TABLET ORAL at 23:45

## 2024-01-12 ASSESSMENT — ACTIVITIES OF DAILY LIVING (ADL)
ADLS_ACUITY_SCORE: 33
ADLS_ACUITY_SCORE: 35

## 2024-01-12 NOTE — ED TRIAGE NOTES
Positional vertigo symptoms. Cold like symptoms x1.5 weeks. Room spins fast per patient. Worse when standing. Runny nose.

## 2024-01-13 ENCOUNTER — TELEPHONE (OUTPATIENT)
Dept: FAMILY MEDICINE | Facility: CLINIC | Age: 64
End: 2024-01-13

## 2024-01-13 PROBLEM — R42 VERTIGO: Status: ACTIVE | Noted: 2024-01-13

## 2024-01-13 PROBLEM — R11.2 NAUSEA AND VOMITING, UNSPECIFIED VOMITING TYPE: Status: ACTIVE | Noted: 2024-01-13

## 2024-01-13 LAB
ETHANOL SERPL-MCNC: <0.01 G/DL
FLUAV RNA SPEC QL NAA+PROBE: NEGATIVE
FLUBV RNA RESP QL NAA+PROBE: NEGATIVE
RSV RNA SPEC NAA+PROBE: NEGATIVE
SARS-COV-2 RNA RESP QL NAA+PROBE: NEGATIVE

## 2024-01-13 PROCEDURE — 87637 SARSCOV2&INF A&B&RSV AMP PRB: CPT | Performed by: STUDENT IN AN ORGANIZED HEALTH CARE EDUCATION/TRAINING PROGRAM

## 2024-01-13 PROCEDURE — 99222 1ST HOSP IP/OBS MODERATE 55: CPT | Performed by: INTERNAL MEDICINE

## 2024-01-13 PROCEDURE — G0378 HOSPITAL OBSERVATION PER HR: HCPCS

## 2024-01-13 PROCEDURE — 250N000013 HC RX MED GY IP 250 OP 250 PS 637: Performed by: STUDENT IN AN ORGANIZED HEALTH CARE EDUCATION/TRAINING PROGRAM

## 2024-01-13 PROCEDURE — 96361 HYDRATE IV INFUSION ADD-ON: CPT

## 2024-01-13 PROCEDURE — 250N000013 HC RX MED GY IP 250 OP 250 PS 637: Performed by: INTERNAL MEDICINE

## 2024-01-13 PROCEDURE — 258N000003 HC RX IP 258 OP 636: Performed by: STUDENT IN AN ORGANIZED HEALTH CARE EDUCATION/TRAINING PROGRAM

## 2024-01-13 RX ORDER — ACETAMINOPHEN 325 MG/1
650 TABLET ORAL EVERY 4 HOURS PRN
Status: DISCONTINUED | OUTPATIENT
Start: 2024-01-13 | End: 2024-01-15 | Stop reason: HOSPADM

## 2024-01-13 RX ORDER — PANTOPRAZOLE SODIUM 40 MG/1
40 TABLET, DELAYED RELEASE ORAL DAILY
Status: DISCONTINUED | OUTPATIENT
Start: 2024-01-13 | End: 2024-01-15 | Stop reason: HOSPADM

## 2024-01-13 RX ORDER — MECLIZINE HYDROCHLORIDE 25 MG/1
25 TABLET ORAL 3 TIMES DAILY PRN
Status: DISCONTINUED | OUTPATIENT
Start: 2024-01-13 | End: 2024-01-15 | Stop reason: HOSPADM

## 2024-01-13 RX ORDER — ONDANSETRON 2 MG/ML
4 INJECTION INTRAMUSCULAR; INTRAVENOUS EVERY 6 HOURS PRN
Status: DISCONTINUED | OUTPATIENT
Start: 2024-01-13 | End: 2024-01-15 | Stop reason: HOSPADM

## 2024-01-13 RX ORDER — ACETAMINOPHEN 650 MG/1
650 SUPPOSITORY RECTAL EVERY 4 HOURS PRN
Status: DISCONTINUED | OUTPATIENT
Start: 2024-01-13 | End: 2024-01-15 | Stop reason: HOSPADM

## 2024-01-13 RX ORDER — ONDANSETRON 4 MG/1
4 TABLET, ORALLY DISINTEGRATING ORAL EVERY 6 HOURS PRN
Status: DISCONTINUED | OUTPATIENT
Start: 2024-01-13 | End: 2024-01-15 | Stop reason: HOSPADM

## 2024-01-13 RX ORDER — PROCHLORPERAZINE 25 MG
25 SUPPOSITORY, RECTAL RECTAL EVERY 12 HOURS PRN
Status: DISCONTINUED | OUTPATIENT
Start: 2024-01-13 | End: 2024-01-15 | Stop reason: HOSPADM

## 2024-01-13 RX ORDER — PROCHLORPERAZINE MALEATE 5 MG
10 TABLET ORAL EVERY 6 HOURS PRN
Status: DISCONTINUED | OUTPATIENT
Start: 2024-01-13 | End: 2024-01-15 | Stop reason: HOSPADM

## 2024-01-13 RX ADMIN — ACETAMINOPHEN 650 MG: 325 TABLET, FILM COATED ORAL at 15:56

## 2024-01-13 RX ADMIN — MECLIZINE HYDROCHLORIDE 25 MG: 25 TABLET ORAL at 11:08

## 2024-01-13 RX ADMIN — PANTOPRAZOLE SODIUM 40 MG: 40 TABLET, DELAYED RELEASE ORAL at 09:04

## 2024-01-13 RX ADMIN — MECLIZINE HYDROCHLORIDE 25 MG: 25 TABLET ORAL at 18:16

## 2024-01-13 RX ADMIN — Medication 1 LOZENGE: at 18:16

## 2024-01-13 RX ADMIN — SODIUM CHLORIDE 1000 ML: 9 INJECTION, SOLUTION INTRAVENOUS at 14:22

## 2024-01-13 RX ADMIN — Medication 1 LOZENGE: at 16:45

## 2024-01-13 ASSESSMENT — ACTIVITIES OF DAILY LIVING (ADL)
ADLS_ACUITY_SCORE: 35
ADLS_ACUITY_SCORE: 31
ADLS_ACUITY_SCORE: 32
ADLS_ACUITY_SCORE: 35
ADLS_ACUITY_SCORE: 32
ADLS_ACUITY_SCORE: 32
ADLS_ACUITY_SCORE: 35
ADLS_ACUITY_SCORE: 35

## 2024-01-13 NOTE — PROGRESS NOTES
[facility]    ED Boarding Nurse Handoff Addendum Report:    Date/time: 1/13/2024, 4:27 AM    Activity Level: standby    Fall Risk: Yes:  patient and family education and activity supervised    Active Infusions: none    Current Meds Due: review MAR    Current care needs: continue plan of care    Oxygen requirements (liters/min and/or FiO2):     Respiratory status: Room air    Vital signs (within last 30 minutes):/71   Pulse 77   Temp 97.6  F (36.4  C) (Oral)   Resp 20   SpO2 97% Focused assessment within last 30 minutes:    A&O. Denies pain. Steady at EOB. No dizziness. VSS on RA. No BM this shift.     ED Boarding Nurse name: Devi Marquez RN

## 2024-01-13 NOTE — PHARMACY-ADMISSION MEDICATION HISTORY
Pharmacy Intern Admission Medication History    Admission medication history is complete. The information provided in this note is only as accurate as the sources available at the time of the update.    Information Source(s): Patient and CareEverywhere/SureScripts via in-person    Pertinent Information: -    Changes made to PTA medication list:  Added: None  Deleted: Cyclobenzaprine, Sildenafil, Terbinafine  Changed: None    Medication Affordability:  Not including over the counter (OTC) medications, was there a time in the past 3 months when you did not take your medications as prescribed because of cost?: No    Allergies reviewed with patient and updates made in EHR: yes    Medication History Completed By: Justin Casiano 1/13/2024 9:19 AM    Prior to Admission medications    Medication Sig Last Dose Taking? Auth Provider Long Term End Date   aspirin 81 MG EC tablet Take 81 mg by mouth once 1/11/2024 at am Yes Reported, Patient                       pantoprazole (PROTONIX) 40 MG EC tablet Take 1 tablet (40 mg) by mouth daily 1/11/2024 at am Yes Eulogio Yuen MD              simvastatin (ZOCOR) 20 MG tablet Take 1 tablet (20 mg) by mouth At Bedtime 1/11/2024 at pm Yes Eulogio Yuen MD Yes

## 2024-01-13 NOTE — ED PROVIDER NOTES
Signed out at change of shift.  In brief came in with vertigo that initially stay likely to be peripheral.  However after initial medications upwards diplopia was noted.  Workup expanded with MRI for which she needed Ativan to tolerate it.  Is also received scopolamine, meclizine, and Zofran.  Symptoms are much better controlled now.  If MRI is negative then discharged with symptomatic medications.    MR Brain w/o Contrast   Final Result   IMPRESSION:   1.  No acute intracranial process.   2.  Mild chronic microvascular ischemic changes.         BP >200 when I went into the room.  States normally closer to 120s/80s.  Rechecked 158/91 which is more similar to earlier in visit.  No stroke.  Symptoms better controlled.  Sipping water to test toleration of liquids.      Ambulatory but unsteady and dizzy.  Trial of oral valium.  Wife also mentioned vomiting and diarrhea today, simultaneous, violent seeming.  Imodium ordered.  No ringing in the ears or muffled hearing.  Would not use steroids at this time.  Possible viral trigger.    1:00 AM Failed second attempt at road test.  Unsteady on feet, nurse had to assist twice to prevent falling.  Admit for further management.    1:20 AM I spoke with Dr. Luis regarding admission.     Kalie Major MD  01/13/24 [id number]

## 2024-01-13 NOTE — PROVIDER NOTIFICATION
Patient has frequent cough, some sinus congestion >1week, has not been COVID tested. Dizziness & blurred vision still present but improved. Requesting order for COVID swab.

## 2024-01-13 NOTE — PROGRESS NOTES
[facility]    ED Boarding Nurse Handoff Addendum Report:    Date/time: 1/13/2024, 9:15 AM    Activity Level: standby    Fall Risk: Yes:  bed/chair alarm on, nonskid shoes/slippers when out of bed, arm band in place, patient and family education, assistive device/personal items within reach, and activity supervised    Active Infusions: NS 1000ml bolus    Current Meds Due: see MAR    Current care needs: IV fluids, monitor dizziness, PT    Oxygen requirements (liters/min and/or FiO2): none    Respiratory status: Room air    Vital signs (within last 30 minutes):    Vitals:    01/13/24 0345 01/13/24 0346 01/13/24 0347 01/13/24 0900   BP: 127/71   135/68   BP Location:    Left arm   Patient Position:    Supine   Pulse: 77   65   Resp:    18   Temp: 97.6  F (36.4  C)   97.6  F (36.4  C)   TempSrc: Oral   Oral   SpO2: 94% 98% 97% 97%       Focused assessment within last 30 minutes:    A&Ox4. Denies pain. No nausea, vomiting, dry heaves, diarrhea since yesterday. Dizziness, vertigo, room spinning present but has improved since onset. No hearing deficits. Complains of slight blurry vision, diplopia. Frequent cough, sinus congestion over 1 week.   PRN meclazine given at 1110 for dizziness.     ED Boarding Nurse name: Shraddha Kong RN

## 2024-01-13 NOTE — H&P
[facility]  Hospitalist Admission Note  Name: Gonzalez Beard    MRN: [id number]  YOB: 1960    Age: 63 year old  Date of admission: 1/12/2024  Primary care provider: Eulogio Yuen    Chief Complaint:  dizziness, vomiting    Assessment and Plan:   Vertigo  Possible gastroenteritis: Presenting with dizziness the room feels like it spinning associate with nausea multiple rounds of vomiting starting this morning it is worse when he looked upwards or stood up.  There had symptoms like this before.  Reported some vertical diplopia on the ER.  Denies headache, hearing loss or tinnitus, paresthesias, focal weakness.  Brain MRI obtained which was negative for acute CVA.  Also, reported some new diarrhea so possible acute gastroenteritis.  Vomiting and diarrhea much better after numerous medications including Imodium, Zofran, 5 mg IV olanzapine, 1 mg IV lorazepam x 2, 2 mg oral diazepam, scopolamine patch, and meclizine 25 mg.    -Somnolent following medications.  Although dizziness/nausea better, remained unsteady on his feet with attempted ambulation in the ER  -Regular diet as tolerated  -IV Zofran and IV Compazine as needed for nausea  -Fall precautions, road test during the day, involve PT if needed later  -If develops any tinnitus or hearing loss or not improving after 24 to 48 hours consider steroid although this seems less likely labyrinthitis at this time  -If double vision not improving and continued severe dizziness I would consider a repeat brain MRI in 24 to 48 hours as well to rule out brainstem CVA with his history of HTN/HLD as this can sometimes be missed on initial MRI  -If more episodes of diarrhea send enteric panel and COVID-19/influenza A/B/RSV PCR    HTN: Denies currently being on any blood pressure medications.  Blood pressure quite elevated at 185/99 in the setting of vomiting that improved to 147/90 without intervention.    HLD: Can hold his simvastatin while here,  observation status.    GERD: Resume pantoprazole 40 mg daily.    Tobacco dependence: NRT if requested.    LUIS E: Unclear if he uses CPAP or not.    DVT Prophylaxis: Low Risk/Ambulatory with no VTE prophylaxis indicated  Code Status: Full Code  FEN: Regular diet  Discharge Dispo: Home likely  Estimated Disch Date / # of Days until Disch: Admit observation for ongoing unsteadiness with ambulation trial.  Road test during the day and advancement of diet as tolerated.      History of Present Illness:  Gonzalez Beard is a 63 year old male with PMH including HTN, HLD, tobacco dependence, GERD, LUIS E, and diverticulitis who presents with dizziness and vomiting.  Somewhat limited history for me from the patient due to somnolence following multiple medications in the ER.  He reported being in normal state of health until early this morning when he started to feel very dizzy.  He felt like the room was spinning.  He did not feel lightheaded like he was in a pass out.  Throughout the day the dizziness worsened and he also developed nausea and vomited.  He also had some diarrhea today which was new.  He denies any abdominal pain, fevers, chills, sick contacts.  Has never had dizziness like this in the past.  Denies any headache, tinnitus, hearing loss, numbness or tingling in extremities, or focal weakness.  While in the ER he did report some vertical double vision, but no vision loss.    History obtained from patient, medical record, and from Dr. aMjor in the emergency department.  Blood pressure initially 185/99 then down to 147/90 without intervention, heart rate 88, temperature 97.3  F, oxygen 97% on room air.  Initial labs showed WBC 10.2, hemoglobin 17.8, platelet count 263.  CMP within normal limits.  Blood glucose 126.  Due to the diplopia and ongoing dizziness a brain MRI was obtained that did not show any acute CVA.  He received numerous medications including Imodium, Zofran, 5 mg IV olanzapine, 1 mg IV lorazepam x 2, 2 mg  oral diazepam, scopolamine patch, and meclizine 25 mg.  He is very somnolent following medications.  Although dizziness/nausea better, remained unsteady on his feet with attempted ambulation in the ER so he is being admitted to observation until symptoms are improved.       Clinically Significant Risk Factors Present on Admission                # Drug Induced Platelet Defect: home medication list includes an antiplatelet medication                             Past Medical History reviewed:  Past Medical History:   Diagnosis Date    Benign neoplasm of colon 05/2008    multiple with diverticulosis - adenomatous, hyperplastic    Diverticulitis 4/20/2014    Diverticulitis of colon 9/07, 1/17    Esophageal reflux 2005    Hyperlipidemia LDL goal <130 1997    Hyperplastic colon polyp 7/09    due 5 yrs    Inguinal hernia with obstruction, without mention of gangrene, unilateral or unspecified, (not specified as recurrent) 9/19/07    right detected on exam     Insomnia, unspecified     sleep issues - Dr Leonard    LUIS E (obstructive sleep apnea) 2018    [facility] - CPAP - 10 cm    RLS (restless legs syndrome) 2018    Shingles 4/14    rt face/ear    Stricture and stenosis of esophagus      Past Surgical History reviewed:  Past Surgical History:   Procedure Laterality Date    COLECTOMY  09/2017    Dr Hayward, robotics assisted sigmoid colectomy    COLONOSCOPY  5/08, 7/09, 7/11, 8/17    multiple polyps with diverticulosis - due 5 yr    ESOPHAGOSCOPY, GASTROSCOPY, DUODENOSCOPY (EGD), COMBINED N/A 01/08/2016    stricture with dilation    ESOPHAGOSCOPY, GASTROSCOPY, DUODENOSCOPY (EGD), COMBINED N/A 06/04/2018    esophageal stenosis, small hiatal hernia, dilated    ESOPHAGOSCOPY, GASTROSCOPY, DUODENOSCOPY (EGD), COMBINED N/A 1/10/2024    Procedure: Esophagoscopy, gastroscopy, duodenoscopy (EGD), combined with biopsies using regular forceps;  Surgeon: Reginald Julien MD;  Location:  GI    ESOPHAGOSCOPY, GASTROSCOPY, DUODENOSCOPY  (EGD), DILATATION, COMBINED N/A 2018    Procedure: COMBINED ESOPHAGOSCOPY, GASTROSCOPY, DUODENOSCOPY (EGD), DILATATION;  COMBINED ESOPHAGOSCOPY, GASTROSCOPY, DUODENOSCOPY (EGD) with biopsies and esophageal dilatation with savary wire 17  ;  Surgeon: Reginald Gay MD;  Location: [facility] ESOPHAGOSCOPY, DIAGNOSTIC  , ,     reactive gastropathy - with strictures dilated x 2  = , 6/15/07,  (stricture x 1, small hiatl hernia)     stress echo  2011    normal    UPPER GI ENDOSCOPY  6/15, ,     stricture, food caught, [place] -  normal -  stricture x 1 dilated, small hiatal hernia     Social History reviewed:  Social History     Tobacco Use    Smoking status: Every Day     Packs/day: 0.80     Years: 25.00     Additional pack years: 0.00     Total pack years: 20.00     Types: Cigarettes    Smokeless tobacco: Former     Quit date: 2010    Tobacco comments:     quit - restarted 2018   Substance Use Topics    Alcohol use: Yes     Alcohol/week: 10.0 standard drinks of alcohol     Comment: 3 x week     Social History     Social History Narrative    Not on file     Family History reviewed:  Family History   Problem Relation Age of Onset    No Known Problems Mother     Hypertension Father     Cerebrovascular Disease Father 65    Prostate Cancer Father 65    Coronary Artery Disease Father 60         at age 86    Prostate Cancer Brother 63    Coronary Artery Disease Brother 60        bypass x 5 - CHF    Diabetes Brother 50    Colon Cancer Brother 63    Coronary Artery Disease Paternal Grandfather     No Known Problems Son     No Known Problems Son     Breast Cancer No family hx of      Allergies:  No Known Allergies  Medications:  Simvastatin 20 mg at bedtime  Pantoprazole 40 mg daily    Review of Systems:  A Comprehensive greater than 10 system review of systems was carried out.  Pertinent positives and negatives are noted above.  Otherwise negative.      Physical Exam:  Blood pressure (!) 158/91, pulse 86, temperature 97.3  F (36.3  C), temperature source Temporal, resp. rate 20, SpO2 96%.  Wt Readings from Last 1 Encounters:   12/20/22 115.2 kg (254 lb)     Exam:  Constitutional: Asleep, NAD  Eyes: sclera white, PERRL  HEENT: atraumatic, MMM  Respiratory: no respiratory distress, lungs cta bilaterally, no crackles or wheeze  Cardiovascular: RRR.  No murmur   GI: Obese, non-tender, not distended, bowel sounds present  Skin: no rash or lesions, acyanotic  Musculoskeletal/extremities: atraumatic, no major deformities. No edema  Neurologic: Somnolent, but could wake up and answer basic questions but immediately fell back to sleep.  Strength is 5 out of 5 in all extremities.  Light touch sensation intact  Psychiatric: calm, cooperative     Lab and imaging data personally reviewed:  Labs:  Recent Labs   Lab 01/12/24  1722   WBC 10.2   HGB 17.8*   HCT 51.2   MCV 84        Recent Labs   Lab 01/12/24  1722      POTASSIUM 4.2   CHLORIDE 101   CO2 22   ANIONGAP 16*   *   BUN 16.1   CR 0.93   GFRESTIMATED >90   STACI 9.6   PROTTOTAL 8.2   ALBUMIN 4.8   BILITOTAL 0.6   ALKPHOS 146   AST 26   ALT 27       Imaging:  Recent Results (from the past 24 hour(s))   MR Brain w/o Contrast    Narrative    EXAM: MR BRAIN W/O CONTRAST  LOCATION: [facility]  DATE: 1/12/2024    INDICATION: VERTIGO, DIPLOPIA WITH LOOKING UP  COMPARISON: None.  TECHNIQUE: Routine multiplanar multisequence head MRI without intravenous contrast.    FINDINGS: Motion degraded exam.  INTRACRANIAL CONTENTS: No acute or subacute infarct. No mass, acute hemorrhage, or extra-axial fluid collections. Scattered nonspecific T2/FLAIR hyperintensities within the cerebral white matter most consistent with mild chronic microvascular ischemic   change. Normal ventricles and sulci. Normal position of the cerebellar tonsils.     SELLA: No abnormality accounting for  technique.    OSSEOUS STRUCTURES/SOFT TISSUES: Normal marrow signal. The major intracranial vascular flow voids are maintained.     ORBITS: No abnormality accounting for technique.     SINUSES/MASTOIDS: Mild mucosal thickening scattered about the paranasal sinuses. No middle ear or mastoid effusion.       Impression    IMPRESSION:  1.  No acute intracranial process.  2.  Mild chronic microvascular ischemic changes.       Winston Luis MD  Hospitalist  [facility]

## 2024-01-13 NOTE — ED NOTES
[facility]  ED Nurse Handoff Report    ED Chief complaint: Dizziness  . ED Diagnosis:   Final diagnoses:   Vertigo   Nausea and vomiting, unspecified vomiting type       Allergies: No Known Allergies    Code Status: Full Code    Activity level - Baseline/Home:  independent.  Activity Level - Current:   assist of 1.   Lift room needed: No.   Bariatric: No   Needed: No   Isolation: No.   Infection: Not Applicable.     Respiratory status: Room air    Vital Signs (within 30 minutes):   Vitals:    01/12/24 2101 01/12/24 2115 01/12/24 2124 01/12/24 2315   BP:  (!) 147/90  (!) 158/91   Pulse:  102  86   Resp:       Temp:       TempSrc:       SpO2: 95% 94% 97% 96%       Cardiac Rhythm:  ,      Pain level:    Patient confused: No.   Patient Falls Risk: nonskid shoes/slippers when out of bed, arm band in place, and patient and family education.   Elimination Status: Has voided     Patient Report - Initial Complaint: dizziness.   Focused Assessment: Patient comes to ED with complaints of sudden onset dizziness this afternoonr. Patient reports symptoms worse when standing/ambulating.  Cold like symptoms x1.5 weeks, wife states several bouts of diarrhea, chills/sweats this morning, no fevers reports. Patient required premedicating for MRI, now drowsy.  Road test x 2 failed with staff having to prevent patient from falling.     Abnormal Results:   Labs Ordered and Resulted from Time of ED Arrival to Time of ED Departure   COMPREHENSIVE METABOLIC PANEL - Abnormal       Result Value    Sodium 139      Potassium 4.2      Carbon Dioxide (CO2) 22      Anion Gap 16 (*)     Urea Nitrogen 16.1      Creatinine 0.93      GFR Estimate >90      Calcium 9.6      Chloride 101      Glucose 126 (*)     Alkaline Phosphatase 146      AST 26      ALT 27      Protein Total 8.2      Albumin 4.8      Bilirubin Total 0.6     CBC WITH PLATELETS AND DIFFERENTIAL - Abnormal    WBC Count 10.2      RBC Count 6.12 (*)      Hemoglobin 17.8 (*)     Hematocrit 51.2      MCV 84      MCH 29.1      MCHC 34.8      RDW 12.2      Platelet Count 263      % Neutrophils 87      % Lymphocytes 7      % Monocytes 5      % Eosinophils 0      % Basophils 0      % Immature Granulocytes 1      NRBCs per 100 WBC 0      Absolute Neutrophils 8.8 (*)     Absolute Lymphocytes 0.7 (*)     Absolute Monocytes 0.5      Absolute Eosinophils 0.0      Absolute Basophils 0.0      Absolute Immature Granulocytes 0.1      Absolute NRBCs 0.0          MR Brain w/o Contrast   Final Result   IMPRESSION:   1.  No acute intracranial process.   2.  Mild chronic microvascular ischemic changes.          Treatments provided: imaging/labs/fluids  Family Comments: wife at bedside  OBS brochure/video discussed/provided to patient:  Yes  ED Medications:   Medications   scopolamine (TRANSDERM) 72 hr patch 1 patch (1 patch Transdermal Patch/Med Removed 1/12/24 1909)     And   scopolamine (TRANSDERM-SCOP) Patch in Place ( Transdermal Patch Free Period 1/13/24 0037)   sodium chloride 0.9% BOLUS 1,000 mL (0 mLs Intravenous Stopped 1/12/24 2022)   ondansetron (ZOFRAN) injection 4 mg (4 mg Intravenous $Given 1/12/24 1714)   meclizine (ANTIVERT) tablet 25 mg (25 mg Oral $Given 1/12/24 1821)   LORazepam (ATIVAN) injection 1 mg (1 mg Intravenous $Given 1/12/24 1909)   LORazepam (ATIVAN) injection 1 mg (1 mg Intravenous $Given 1/12/24 2024)   OLANZapine (zyPREXA) IV injection 5 mg (5 mg Intravenous $Given 1/12/24 2058)   sodium chloride 0.9% BOLUS 1,000 mL (0 mLs Intravenous Stopped 1/12/24 2328)   diazepam (VALIUM) tablet 2 mg (2 mg Oral $Given 1/12/24 2345)   loperamide (IMODIUM) capsule 4 mg (4 mg Oral $Given 1/12/24 2350)       Drips infusing:  No  For the majority of the shift this patient was Green.   Interventions performed were none.    Sepsis treatment initiated: No    Cares/treatment/interventions/medications to be completed following ED care: see orders.    ED Nurse Name: Lubna  DA James  1:21 AM

## 2024-01-13 NOTE — ED PROVIDER NOTES
History     Chief Complaint:  Dizziness       HPI   Gonzalez Beard is a 63 year old male who presents with dizziness and nausea and vomiting.  Patient is a 63-year-old male without significant past medical history other than high blood pressure and high cholesterol.  Patient states he was in his normal state of health up until this morning.  Developed acute dizziness described as the room spinning.  Worse when he looked up or stood up.  Patient had nausea and vomiting and presents to the emergency room for assessment.  Denies ringing in the ear.  Initially denies double vision denies headache or neck trauma.      Independent Historian:   None - Patient Only    Review of External Notes:          Medications:    aspirin 81 MG EC tablet  cyclobenzaprine (FLEXERIL) 10 MG tablet  diclofenac (VOLTAREN) 75 MG EC tablet  pantoprazole (PROTONIX) 40 MG EC tablet  sildenafil (VIAGRA) 50 MG tablet  simvastatin (ZOCOR) 20 MG tablet  terbinafine (LAMISIL) 250 MG tablet  terbinafine (LAMISIL) 250 MG tablet        Past Medical History:    Past Medical History:   Diagnosis Date    Benign neoplasm of colon 05/2008    Diverticulitis 4/20/2014    Diverticulitis of colon 9/07, 1/17    Esophageal reflux 2005    Hyperlipidemia LDL goal <130 1997    Hyperplastic colon polyp 7/09    Inguinal hernia with obstruction, without mention of gangrene, unilateral or unspecified, (not specified as recurrent) 9/19/07    Insomnia, unspecified     LUIS E (obstructive sleep apnea) 2018    RLS (restless legs syndrome) 2018    Shingles 4/14    Stricture and stenosis of esophagus        Past Surgical History:    Past Surgical History:   Procedure Laterality Date    COLECTOMY  09/2017    Dr Hayward, robotics assisted sigmoid colectomy    COLONOSCOPY  5/08, 7/09, 7/11, 8/17    multiple polyps with diverticulosis - due 5 yr    ESOPHAGOSCOPY, GASTROSCOPY, DUODENOSCOPY (EGD), COMBINED N/A 01/08/2016    stricture with dilation    ESOPHAGOSCOPY, GASTROSCOPY,  DUODENOSCOPY (EGD), COMBINED N/A 06/04/2018    esophageal stenosis, small hiatal hernia, dilated    ESOPHAGOSCOPY, GASTROSCOPY, DUODENOSCOPY (EGD), COMBINED N/A 1/10/2024    Procedure: Esophagoscopy, gastroscopy, duodenoscopy (EGD), combined with biopsies using regular forceps;  Surgeon: Reginald Julien MD;  Location:  GI    ESOPHAGOSCOPY, GASTROSCOPY, DUODENOSCOPY (EGD), DILATATION, COMBINED N/A 06/04/2018    Procedure: COMBINED ESOPHAGOSCOPY, GASTROSCOPY, DUODENOSCOPY (EGD), DILATATION;  COMBINED ESOPHAGOSCOPY, GASTROSCOPY, DUODENOSCOPY (EGD) with biopsies and esophageal dilatation with savary wire 17  ;  Surgeon: Reginald Gay MD;  Location: [facility] ESOPHAGOSCOPY, DIAGNOSTIC  5/07, 2/09, 6/18    reactive gastropathy - with strictures dilated x 2  = 5/07, 6/15/07, 6/18 (stricture x 1, small hiatl hernia)     stress echo  09/2011    normal    UPPER GI ENDOSCOPY  6/15, 1/16, 6/18    stricture, food caught, [place] - 1/16 normal - 6/18 stricture x 1 dilated, small hiatal hernia        Physical Exam   Patient Vitals for the past 24 hrs:   BP Temp Temp src Pulse Resp SpO2   01/12/24 2124 -- -- -- -- -- 97 %   01/12/24 2115 (!) 147/90 -- -- 102 -- 94 %   01/12/24 2101 -- -- -- -- -- 95 %   01/12/24 2100 (!) 141/76 -- -- 94 -- --   01/12/24 1700 -- -- -- -- -- 98 %   01/12/24 1638 -- 97.3  F (36.3  C) Temporal 88 20 99 %   01/12/24 1637 (!) 185/99 -- -- -- -- --        Physical Exam  Vitals reviewed.   HENT:      Head: Normocephalic.      Right Ear: Tympanic membrane normal.      Left Ear: Tympanic membrane normal.      Nose: Nose normal.      Mouth/Throat:      Mouth: Mucous membranes are moist.   Eyes:      Pupils: Pupils are equal, round, and reactive to light.      Comments: Patient has bidirectional nystagmus with slight disconjugate gaze when looking upward.   Cardiovascular:      Rate and Rhythm: Normal rate and regular rhythm.   Pulmonary:      Effort: Pulmonary effort is normal.      Breath  sounds: Normal breath sounds.   Abdominal:      General: Abdomen is flat.   Musculoskeletal:         General: Normal range of motion.   Skin:     General: Skin is warm.      Capillary Refill: Capillary refill takes less than 2 seconds.   Neurological:      General: No focal deficit present.      Mental Status: He is alert and oriented to person, place, and time.   Psychiatric:         Mood and Affect: Mood normal.         Emergency Department Course       Imaging:  MR Brain w/o Contrast   Final Result   IMPRESSION:   1.  No acute intracranial process.   2.  Mild chronic microvascular ischemic changes.      MR Brain w/o Contrast   Final Result   IMPRESSION:   1.  No acute intracranial process.   2.  Mild chronic microvascular ischemic changes.             Laboratory:  Labs Ordered and Resulted from Time of ED Arrival to Time of ED Departure   COMPREHENSIVE METABOLIC PANEL - Abnormal       Result Value    Sodium 139      Potassium 4.2      Carbon Dioxide (CO2) 22      Anion Gap 16 (*)     Urea Nitrogen 16.1      Creatinine 0.93      GFR Estimate >90      Calcium 9.6      Chloride 101      Glucose 126 (*)     Alkaline Phosphatase 146      AST 26      ALT 27      Protein Total 8.2      Albumin 4.8      Bilirubin Total 0.6     CBC WITH PLATELETS AND DIFFERENTIAL - Abnormal    WBC Count 10.2      RBC Count 6.12 (*)     Hemoglobin 17.8 (*)     Hematocrit 51.2      MCV 84      MCH 29.1      MCHC 34.8      RDW 12.2      Platelet Count 263      % Neutrophils 87      % Lymphocytes 7      % Monocytes 5      % Eosinophils 0      % Basophils 0      % Immature Granulocytes 1      NRBCs per 100 WBC 0      Absolute Neutrophils 8.8 (*)     Absolute Lymphocytes 0.7 (*)     Absolute Monocytes 0.5      Absolute Eosinophils 0.0      Absolute Basophils 0.0      Absolute Immature Granulocytes 0.1      Absolute NRBCs 0.0            Emergency Department Course & Assessments:             Interventions:  Medications   scopolamine (TRANSDERM)  72 hr patch 1 patch (1 patch Transdermal Patch/Med Removed 1/12/24 1909)     And   scopolamine (TRANSDERM-SCOP) Patch in Place ( Transdermal Patch in Place 1/12/24 1721)   sodium chloride 0.9% BOLUS 1,000 mL (0 mLs Intravenous Stopped 1/12/24 2022)   ondansetron (ZOFRAN) injection 4 mg (4 mg Intravenous $Given 1/12/24 1714)   meclizine (ANTIVERT) tablet 25 mg (25 mg Oral $Given 1/12/24 1821)   LORazepam (ATIVAN) injection 1 mg (1 mg Intravenous $Given 1/12/24 1909)   LORazepam (ATIVAN) injection 1 mg (1 mg Intravenous $Given 1/12/24 2024)   OLANZapine (zyPREXA) IV injection 5 mg (5 mg Intravenous $Given 1/12/24 2058)        Assessments:      Independent Interpretation (X-rays, CTs, rhythm strip):  None    Consultations/Discussion of Management or Tests:  None   ED Course as of 01/15/24 1444   Sat Jan 13, 2024   0119 I spoke to Dr. Luis regarding admission       Social Determinants of Health affecting care:   None    Disposition:  Care of the patient was transferred to my colleague Dr. Major pending MRI     Impression & Plan      Medical Decision Making:  Patient is a 63-year-old male who presents with dizziness described as the room spinning.  Symptoms are highly suspicious for vertigo.  First-time onset.  No medications given.  On reexamination Jarek patient did describe double vision with looking upward.  I do not identify a clear disconjugate gaze or cranial neuropathy however due to double vision in the setting of vertigo I recommended MRI for complete assessment.  This is pending and signed out to the oncoming physician.  If MRI is negative and patient's symptoms sharon or improved patient can be discharged home.      Diagnosis:    ICD-10-CM    1. Vertigo  R42 Physical Therapy Referral     ondansetron (ZOFRAN) 4 MG tablet     predniSONE (DELTASONE) 10 MG tablet     DISCONTINUED: predniSONE (DELTASONE) 10 MG tablet     DISCONTINUED: diazepam (VALIUM) 5 MG tablet     CANCELED: Physical Therapy Referral      2.  Nausea and vomiting, unspecified vomiting type  R11.2            Discharge Medications:  New Prescriptions    No medications on file          Deo Garcia MD  1/12/2024   Kalie Major, *        Deo Garcia MD  01/15/24 2649

## 2024-01-13 NOTE — PROGRESS NOTES
Brief Medical Update (please see excellent H&P by Dr. Luis for further details):    Vertigo  Possible gastroenteritis  Nausea improved. Dizziness better but still present when standing. No double vision. Discussed repeat MRI but patient very hesitant 2/2 claustrophobia. His dizziness is mild and associated with lightheadedness so reasonable to hold off for now. Encouraged oral hydration and gave 1L NS. Likely can discharge tomorrow if minimal dizziness.     Eileen Becker, DO

## 2024-01-13 NOTE — ED NOTES
Pat walked in [place] with staff, patient reports no change in dizziness, often unsteady on feet, at one point would have fallen if not for staff and wall handrail.  MD updated.     0100 Patient attempted second road test, still requiring assistance from staff to not pitch forward into wall/bed. MD updated will see bedside.

## 2024-01-13 NOTE — TELEPHONE ENCOUNTER
Prior Authorization Retail Medication Request    Medication/Dose: Scopolamine 1mg patch  Diagnosis and ICD code (if different than what is on RX):    New/renewal/insurance change PA/secondary ins. PA:  Previously Tried and Failed:    Rationale:      Insurance   Primary: [employer] [facility]  Insurance ID:  [id number]    Thank You  Angus Pascal carlos  [facility] [place]

## 2024-01-14 ENCOUNTER — APPOINTMENT (OUTPATIENT)
Dept: MRI IMAGING | Facility: CLINIC | Age: 64
End: 2024-01-14
Attending: PHYSICIAN ASSISTANT
Payer: COMMERCIAL

## 2024-01-14 PROCEDURE — 250N000013 HC RX MED GY IP 250 OP 250 PS 637: Performed by: PHYSICIAN ASSISTANT

## 2024-01-14 PROCEDURE — 96376 TX/PRO/DX INJ SAME DRUG ADON: CPT

## 2024-01-14 PROCEDURE — 70551 MRI BRAIN STEM W/O DYE: CPT

## 2024-01-14 PROCEDURE — G0378 HOSPITAL OBSERVATION PER HR: HCPCS

## 2024-01-14 PROCEDURE — 250N000013 HC RX MED GY IP 250 OP 250 PS 637: Performed by: INTERNAL MEDICINE

## 2024-01-14 PROCEDURE — 250N000009 HC RX 250: Performed by: PHYSICIAN ASSISTANT

## 2024-01-14 PROCEDURE — 250N000012 HC RX MED GY IP 250 OP 636 PS 637: Performed by: PHYSICIAN ASSISTANT

## 2024-01-14 PROCEDURE — 250N000011 HC RX IP 250 OP 636: Performed by: NURSE PRACTITIONER

## 2024-01-14 PROCEDURE — 99233 SBSQ HOSP IP/OBS HIGH 50: CPT | Performed by: PHYSICIAN ASSISTANT

## 2024-01-14 RX ORDER — DIAZEPAM 5 MG
5 TABLET ORAL EVERY 8 HOURS PRN
Qty: 9 TABLET | Refills: 0 | Status: SHIPPED | OUTPATIENT
Start: 2024-01-14 | End: 2024-01-15

## 2024-01-14 RX ORDER — OLANZAPINE 10 MG/1
2.5 INJECTION, POWDER, LYOPHILIZED, FOR SOLUTION INTRAMUSCULAR ONCE
Status: COMPLETED | OUTPATIENT
Start: 2024-01-14 | End: 2024-01-14

## 2024-01-14 RX ORDER — PREDNISONE 10 MG/1
TABLET ORAL
Qty: 34 TABLET | Refills: 0 | Status: SHIPPED | OUTPATIENT
Start: 2024-01-15 | End: 2024-01-15

## 2024-01-14 RX ORDER — DIAZEPAM 5 MG
5 TABLET ORAL
Status: COMPLETED | OUTPATIENT
Start: 2024-01-14 | End: 2024-01-14

## 2024-01-14 RX ORDER — SCOLOPAMINE TRANSDERMAL SYSTEM 1 MG/1
1 PATCH, EXTENDED RELEASE TRANSDERMAL
Status: DISCONTINUED | OUTPATIENT
Start: 2024-01-14 | End: 2024-01-15 | Stop reason: HOSPADM

## 2024-01-14 RX ORDER — DIAZEPAM 5 MG
5 TABLET ORAL
Status: DISCONTINUED | OUTPATIENT
Start: 2024-01-14 | End: 2024-01-14

## 2024-01-14 RX ORDER — ONDANSETRON 4 MG/1
4 TABLET, FILM COATED ORAL EVERY 8 HOURS PRN
Qty: 12 TABLET | Refills: 0 | Status: SHIPPED | OUTPATIENT
Start: 2024-01-14

## 2024-01-14 RX ORDER — BENZONATATE 100 MG/1
100 CAPSULE ORAL 3 TIMES DAILY PRN
Status: DISCONTINUED | OUTPATIENT
Start: 2024-01-14 | End: 2024-01-15 | Stop reason: HOSPADM

## 2024-01-14 RX ORDER — ALBUTEROL SULFATE 0.83 MG/ML
2.5 SOLUTION RESPIRATORY (INHALATION) EVERY 6 HOURS PRN
Status: DISCONTINUED | OUTPATIENT
Start: 2024-01-14 | End: 2024-01-15 | Stop reason: HOSPADM

## 2024-01-14 RX ORDER — PREDNISONE 20 MG/1
60 TABLET ORAL DAILY
Status: DISCONTINUED | OUTPATIENT
Start: 2024-01-14 | End: 2024-01-15 | Stop reason: HOSPADM

## 2024-01-14 RX ADMIN — SCOPALAMINE 1 PATCH: 1 PATCH, EXTENDED RELEASE TRANSDERMAL at 16:41

## 2024-01-14 RX ADMIN — MECLIZINE HYDROCHLORIDE 25 MG: 25 TABLET ORAL at 08:58

## 2024-01-14 RX ADMIN — OLANZAPINE 2.5 MG: 10 INJECTION, POWDER, FOR SOLUTION INTRAMUSCULAR at 18:19

## 2024-01-14 RX ADMIN — PANTOPRAZOLE SODIUM 40 MG: 40 TABLET, DELAYED RELEASE ORAL at 08:58

## 2024-01-14 RX ADMIN — DIAZEPAM 5 MG: 5 TABLET ORAL at 17:12

## 2024-01-14 RX ADMIN — MIDAZOLAM 2 MG: 1 INJECTION INTRAMUSCULAR; INTRAVENOUS at 19:00

## 2024-01-14 RX ADMIN — PREDNISONE 60 MG: 20 TABLET ORAL at 10:42

## 2024-01-14 RX ADMIN — MECLIZINE HYDROCHLORIDE 25 MG: 25 TABLET ORAL at 14:03

## 2024-01-14 RX ADMIN — OLANZAPINE 2.5 MG: 10 INJECTION, POWDER, FOR SOLUTION INTRAMUSCULAR at 18:40

## 2024-01-14 RX ADMIN — SCOPALAMINE 1 PATCH: 1 PATCH, EXTENDED RELEASE TRANSDERMAL at 20:12

## 2024-01-14 RX ADMIN — ACETAMINOPHEN 650 MG: 325 TABLET, FILM COATED ORAL at 03:23

## 2024-01-14 ASSESSMENT — ACTIVITIES OF DAILY LIVING (ADL)
ADLS_ACUITY_SCORE: 32

## 2024-01-14 NOTE — PLAN OF CARE
PRIMARY DIAGNOSIS: VERTIGO     OUTPATIENT/OBSERVATION GOALS TO BE MET BEFORE DISCHARGE  1. Orthostatic performed: No     2. Completion of appropriate imaging: Yes     3. Tolerating PO medications: Yes     4. Return to near baseline physical activity: No     5. Cleared for discharge by consultants (if involved): Yes     Discharge Planner Nurse   Safe discharge environment identified: Yes  Barriers to discharge: Yes       Entered by: Alicia Caldwell RN 01/14/2024 7:54 AM     Vitals are Temp: 97.6  F (36.4  C) Temp src: Oral BP: 134/75 Pulse: 61   Resp: 18 SpO2: 95 %.  Patient is Alert and Oriented x4. They are SBA with Gait Belt.  Pt is a Regular diet. They complained of pain in the back 3/10, tylenol given. Patient is Saline locked. Pt has been resting comfortably. Continuing to monitor for symptoms. Most likely to discharge tomorrow.         Please review provider order for any additional goals.   Nurse to notify provider when observation goals have been met and patient is ready for discharge.

## 2024-01-14 NOTE — PLAN OF CARE
PRIMARY DIAGNOSIS: VERTIGO    OUTPATIENT/OBSERVATION GOALS TO BE MET BEFORE DISCHARGE  1. Orthostatic performed: N/A    2. Completion of appropriate imaging: Yes    3. Tolerating PO medications: Yes    4. Return to near baseline physical activity: Yes    5. Cleared for discharge by consultants (if involved): N/A    Discharge Planner Nurse   Safe discharge environment identified: Yes  Barriers to discharge: Yes, continued dizziness       Entered by: Eileen Galarza RN 01/13/2024       VSS. Tylenol given x 1 for headache with improvement. Reports continued double vision, dizziness, sore throat. Meclizine given. Patient does report ear pressure last week with URI. Denies nausea, tolerating diet. Wife bedside. Home CPAP bedside. Up with SBA - instructed to call before getting up, patient agreeable. 1L bolus infusing.     Please review provider order for any additional goals.   Nurse to notify provider when observation goals have been met and patient is ready for discharge.

## 2024-01-14 NOTE — PLAN OF CARE
PRIMARY DIAGNOSIS: VERTIGO    OUTPATIENT/OBSERVATION GOALS TO BE MET BEFORE DISCHARGE  1. Orthostatic performed: No    2. Completion of appropriate imaging: Yes    3. Tolerating PO medications: Yes    4. Return to near baseline physical activity: No    5. Cleared for discharge by consultants (if involved): Yes    Discharge Planner Nurse   Safe discharge environment identified: Yes  Barriers to discharge: Yes       Entered by: Alicia Caldwell RN 01/14/2024 2:48 AM    Vitals are Temp: 98  F (36.7  C) Temp src: Oral BP: 122/76 Pulse: 76   Resp: 16 SpO2: 98 %.  Patient is Alert and Oriented x4. They are SBA with Gait Belt.  Pt is a Regular diet. They are denying pain. Patient is Saline locked. Pt has been resting comfortably. Continuing to monitor for symptoms. Most likely to discharge tomorrow.        Please review provider order for any additional goals.   Nurse to notify provider when observation goals have been met and patient is ready for discharge.

## 2024-01-14 NOTE — PROGRESS NOTES
[facility]    Medicine Progress Note - Hospitalist Service    Date of Admission:  1/12/2024    Assessment & Plan Gonzalez Beard is a 63 year old male with PMH including HTN, HLD, tobacco dependence, GERD, LUIS E, diverticulitis, who was admitted on 1/12/2024 after presenting for evaluation of dizziness.    Dizziness, suspect Peripheral Vertigo  Had viral URI/cold symptoms for 1.5 week PTA.  Presented with positional vertigo, room spinning sensation. This was accompanied by episode of nausea multiple rounds of vomiting starting this morning it is worse when he looked upwards or stood up.  There had symptoms like this before.  Reported some vertical diplopia on the ER.  Denies headache, hearing loss or tinnitus, paresthesias, focal weakness.  Brain MRI obtained which was negative for acute CVA. Admitted to observation treated with anti emetics.   -Suspect peripheral vertigo, vestibular neuritis given history of triggered by recent viral URI, left sided nystagmus. Diagnosis fits by clinical exam and history. Per UTD 10 day course prednisone given his ongoing symptoms refractory to meclizine alone   -admitting provider advised repeat brain mri to r/out brainstem CVA with his history of HTN/HLD as this can sometimes be missed on initial MRI given that he has had reported ongoing dizziness and vague visual symptoms. Overall clinical suspicion is low for central vertigo however wife and patient would feel more comfortable returning home to continue supportive cares assuming MRI was negative for anything acute. Will require anxiolytics, Valium appears to be what was given last in ED to facilitate exam.   -continue symptom management: scopolamine patch, meclizine PRN  -outpatient vestibular PT    Recent viral URI: Scaterred rhonchi on exam with cough. Continue supportive cares. outpatient Pfts recommended with PCP. Consider albuterol.     HTN: Denies currently being on any blood pressure medications.  Blood  "pressure quite elevated at 185/99 in the setting of vomiting that improved to 147/90 without intervention.     HLD: Can hold his simvastatin while here, observation status.     GERD: Resume pantoprazole 40 mg daily.     Tobacco dependence: NRT if requested. Wheezing on exam, no hypoxia. Suspect rhonchi triggered by recent viral URI.      LUIS E: cont home cpap.        Diet: Regular Diet Adult    DVT Prophylaxis: Ambulate every shift  David Catheter: Not present  Lines: None     Cardiac Monitoring: None  Code Status: Full Code      Clinically Significant Risk Factors Present on Admission                # Drug Induced Platelet Defect: home medication list includes an antiplatelet medication        # Obesity: Estimated body mass index is 30.94 kg/m  as calculated from the following:    Height as of this encounter: 1.905 m (6' 3\").    Weight as of this encounter: 112.3 kg (247 lb 9.2 oz).              Disposition Plan      Expected Discharge Date: 01/14/2024                  Shayy Lynch PA-C  Hospitalist Service  [facility]  Securely message with [employer]Cut (more info)  Text page via [employer] Paging/Directory   ______________________________________________________________________    Interval History   Pt has been denying dizziness at rest, one small episode when ambulating to the bathroom. No tinnitus. Did have ear pressure with URI last week. Sounds congested. No fever, headache.     Have been back into the patient's room x 3 to discussed care plan with patient and patient's wife.  They have been talking to his nephew who is a radiologist and and DNI have answered their questions to the best of my ability they are intermittently undecided about whether he wants to go home or have an MRI done today.  He has complained of visual symptoms to other providers currently he does not have any double vision.    Physical Exam   Vital Signs: Temp: 97.6  F (36.4  C) Temp src: Oral BP: 134/75 Pulse: 61   Resp: 18 " SpO2: 95 % O2 Device: None (Room air)    Weight: 247 lbs 9.23 oz    Constitutional: Awake, alert, no apparent distress  Respiratory:  Normal work of breathing.  Scattered rhonchi bilaterally.  HEENT: Left-sided slight nystagmus.  Symptoms are worse with left lateral head movements.  No double vision on visual acuity exam.  Has sinus congestion.  Cardiovascular: Regular rate and rhythm, normal S1 and S2, and no murmur appreciated.   GI: Bowel sounds present. soft, non-distended, non-tender.   Skin/Integument: Warm, dry. no peripheral edema.  Neuro: No focal deficits. Moving all extremities with normal strength. Coordination and sensation grossly intact. Speech clear. No focal deficits.   Psych: Appropriate affect.        Medical Decision Making       55 MINUTES SPENT BY ME on the date of service doing chart review, history, exam, documentation & further activities per the note.      Data   ------------------------- PAST 24 HR DATA REVIEWED -----------------------------------------------[id number]}

## 2024-01-14 NOTE — PLAN OF CARE
ROOM # 206-01    Living Situation (if not independent, order SW consult): Ind with spouse and kids  Facility name:  : Sabrina (wife) [phone]     Activity level at baseline: Ind  Activity level on admit: SBA w/ GB    Who will be transporting you at discharge: Sabrina    Patient registered to observation; given Patient Bill of Rights; given the opportunity to ask questions about observation status and their plan of care.  Patient has been oriented to the observation room, bathroom and call light is in place.    Discussed discharge goals and expectations with patient/family.

## 2024-01-14 NOTE — PLAN OF CARE
PRIMARY DIAGNOSIS: VERTIGO     OUTPATIENT/OBSERVATION GOALS TO BE MET BEFORE DISCHARGE  1. Orthostatic performed: No     2. Completion of appropriate imaging: Yes     3. Tolerating PO medications: Yes     4. Return to near baseline physical activity: No     5. Cleared for discharge by consultants (if involved): Yes     Discharge Planner Nurse   Safe discharge environment identified: Yes  Barriers to discharge: Yes       Entered by: Alicia Caldwell RN 01/14/2024 2:51 AM     Vitals are Temp: 97.9  F (36.6  C) Temp src: Oral BP: 155/79 Pulse: 64   Resp: 18 SpO2: 94 %.  Patient is Alert and Oriented x4. They are SBA with Gait Belt.  Pt is a Regular diet. They are denying pain. Patient is Saline locked. Pt has been denying dizziness at rest, one small episode when ambulating to the bathroom. Has been calm and resting on this shift. Pt notes he had ear pressure with an infection last week. Continuing to monitor symptoms.         Please review provider order for any additional goals.   Nurse to notify provider when observation goals have been met and patient is ready for discharge.

## 2024-01-15 VITALS
RESPIRATION RATE: 16 BRPM | WEIGHT: 247.58 LBS | TEMPERATURE: 97.6 F | HEART RATE: 69 BPM | BODY MASS INDEX: 30.78 KG/M2 | SYSTOLIC BLOOD PRESSURE: 162 MMHG | DIASTOLIC BLOOD PRESSURE: 93 MMHG | HEIGHT: 75 IN | OXYGEN SATURATION: 97 %

## 2024-01-15 PROCEDURE — G0378 HOSPITAL OBSERVATION PER HR: HCPCS

## 2024-01-15 PROCEDURE — 250N000012 HC RX MED GY IP 250 OP 636 PS 637: Performed by: PHYSICIAN ASSISTANT

## 2024-01-15 PROCEDURE — 250N000013 HC RX MED GY IP 250 OP 250 PS 637: Performed by: INTERNAL MEDICINE

## 2024-01-15 PROCEDURE — 99239 HOSP IP/OBS DSCHRG MGMT >30: CPT | Performed by: PHYSICIAN ASSISTANT

## 2024-01-15 RX ORDER — PREDNISONE 10 MG/1
TABLET ORAL
Qty: 28 TABLET | Refills: 0 | Status: SHIPPED | OUTPATIENT
Start: 2024-01-16 | End: 2024-01-23

## 2024-01-15 RX ADMIN — PANTOPRAZOLE SODIUM 40 MG: 40 TABLET, DELAYED RELEASE ORAL at 08:19

## 2024-01-15 RX ADMIN — PREDNISONE 60 MG: 20 TABLET ORAL at 08:19

## 2024-01-15 RX ADMIN — MECLIZINE HYDROCHLORIDE 25 MG: 25 TABLET ORAL at 08:30

## 2024-01-15 ASSESSMENT — ACTIVITIES OF DAILY LIVING (ADL)
ADLS_ACUITY_SCORE: 32
ADLS_ACUITY_SCORE: 33
ADLS_ACUITY_SCORE: 32
ADLS_ACUITY_SCORE: 32
ADLS_ACUITY_SCORE: 33
ADLS_ACUITY_SCORE: 33
ADLS_ACUITY_SCORE: 32
ADLS_ACUITY_SCORE: 33

## 2024-01-15 NOTE — CARE PLAN
Patient's After Visit Summary was reviewed with patient.  Patient verbalized understanding of After Visit Summary, recommended follow up and was given an opportunity to ask questions.   Discharge medications sent home with patient/family: YES   Discharged with spouse

## 2024-01-15 NOTE — PROVIDER NOTIFICATION
Notified by MRI that they were planning for MRI at 18:00. Valium given at 17:12. At 17:45, patient reported he was feeling no effects of medication. Appeared anxious.    [employer] notified. IV zyprexa ordered.     Gil RN came to unit to transport patient. 2.5mg (0.5mL) IV zyprexa given immediately prior to transport at 18:19.    Patient transported via cart with flying conchita Kinney RN.     Notified by Flying Squad RN that patient was not tolerating MRI despite additional dose IV Zyprexa.     Midazolam ordered. Pulled from [facility] for flyer and walked down. Handed 2-two mg vials midazolam to Gregoria ROBERSON.     Witnessed administration of 2mg midazolam by Gregoria ROBERSON Flyer.     Patient arrived back to unit ~19:25. 2mg vial midazolam returned to [facility] with Gregoria ROBERSON.

## 2024-01-15 NOTE — PROGRESS NOTES
Took patient on walk per PA - pt did ok, however he ws still feeling dizzy and still not able to walk without bouncing off the walls a bit. PA updated

## 2024-01-15 NOTE — PLAN OF CARE
PRIMARY DIAGNOSIS: VERTIGO     OUTPATIENT/OBSERVATION GOALS TO BE MET BEFORE DISCHARGE  1. Orthostatic performed: No     2. Completion of appropriate imaging: Yes     3. Tolerating PO medications: Yes     4. Return to near baseline physical activity: No     5. Cleared for discharge by consultants (if involved): No     Discharge Planner Nurse   Safe discharge environment identified: Yes  Barriers to discharge: Yes       Entered by: Alicia Caldwell RN 01/15/2024 3:15 AM     Vitals are Temp: 97.9  F (36.6  C) Temp src: Oral BP: (!) 152/64 Pulse: 66   Resp: 18 SpO2: 94 %.  Patient is Alert and Oriented x4. They are SBA with Gait Belt.  Pt is a Regular diet.  They are denying pain.  Patient is Saline locked. Pt received repeat MRI of brain this evening, see results for impressions. Scopolamine patch placed behind ear. Updated wife Sabrina over the phone about results of MRI. Pt is resting comfortably in bed.         Please review provider order for any additional goals.   Nurse to notify provider when observation goals have been met and patient is ready for discharge.

## 2024-01-15 NOTE — PLAN OF CARE
PRIMARY DIAGNOSIS: VERTIGO    OUTPATIENT/OBSERVATION GOALS TO BE MET BEFORE DISCHARGE  1. Orthostatic performed: No    2. Completion of appropriate imaging: Yes    3. Tolerating PO medications: Yes    4. Return to near baseline physical activity: No    5. Cleared for discharge by consultants (if involved): No    Discharge Planner Nurse   Safe discharge environment identified: Yes  Barriers to discharge: Yes       Entered by: Alicia Caldwell RN 01/14/2024 9:35 PM    Vitals are Temp: 98.1  F (36.7  C) Temp src: Oral BP: (!) 163/88 Pulse: 65   Resp: 16 SpO2: 98 %.  Patient is Alert and Oriented x4. They are SBA with Gait Belt.  Pt is a Regular diet.  They are denying pain.  Patient is Saline locked.       Please review provider order for any additional goals.   Nurse to notify provider when observation goals have been met and patient is ready for discharge.

## 2024-01-15 NOTE — PLAN OF CARE
PRIMARY DIAGNOSIS: VERTIGO    OUTPATIENT/OBSERVATION GOALS TO BE MET BEFORE DISCHARGE  1. Orthostatic performed: N/A    2. Completion of appropriate imaging: No    3. Tolerating PO medications: Yes    4. Return to near baseline physical activity: Yes    5. Cleared for discharge by consultants (if involved): N/A    Discharge Planner Nurse   Safe discharge environment identified: Yes  Barriers to discharge: Yes, continued dizziness       Entered by: Eileen Galarza RN 01/14/2024       VSS. Patient poor historian - intermittently reported double vision, then blurred vision. Reported mild dizziness to writer but 'feeling miserable' to wife. Attemped to ambulate - bent over to put shoes on and became diaphoretic, pale. Meclizine given x 2. Prednisone burst started today. SL. Denies nausea, tolerating diet. Wife bedside. Home CPAP bedside. Up with SBA - instructed to call before getting up, patient agreeable.     Please review provider order for any additional goals.   Nurse to notify provider when observation goals have been met and patient is ready for discharge.

## 2024-01-15 NOTE — PLAN OF CARE
PRIMARY DIAGNOSIS: VERTIGO     OUTPATIENT/OBSERVATION GOALS TO BE MET BEFORE DISCHARGE  1. Orthostatic performed: No     2. Completion of appropriate imaging: Yes     3. Tolerating PO medications: Yes     4. Return to near baseline physical activity: No     5. Cleared for discharge by consultants (if involved): No     Discharge Planner Nurse   Safe discharge environment identified: Yes  Barriers to discharge: Yes       Entered by: Alicia Caldwell RN 01/15/2024 6:30 AM    Vitals are Temp: 97.8  F (36.6  C) Temp src: Oral BP: 137/82 Pulse: 66   Resp: 16 SpO2: 95 %.  Patient is Alert and Oriented x4. They are SBA with Gait Belt.  Pt is a Regular diet.  They are denying pain.  Patient is Saline locked. Pt received repeat MRI of brain this evening, see results for impressions. Scopolamine patch placed behind ear. Updated wife Sabrina over the phone about results of MRI. Pt is resting comfortably in bed. Pt is requesting bed alarm be off now that sedation drugs wore off. Educated pt on when to call for help if dizziness occurs. Most likely discharge today.        Please review provider order for any additional goals.   Nurse to notify provider when observation goals have been met and patient is ready for discharge.

## 2024-01-15 NOTE — PLAN OF CARE
Goal Outcome Evaluation:       PRIMARY DIAGNOSIS: VERTIGO    OUTPATIENT/OBSERVATION GOALS TO BE MET BEFORE DISCHARGE  1. Orthostatic performed: No    2. Completion of appropriate imaging: Yes    3. Tolerating PO medications: Yes    4. Return to near baseline physical activity: Yes    5. Cleared for discharge by consultants (if involved): No    Discharge Planner Nurse   Safe discharge environment identified: Yes  Barriers to discharge: No       Entered by: Rebel Regan RN 01/15/2024 9:52 AM   Pt A/Ox4  pleasant, up walking hallways, still a little unsteady. Family at bedside, pt should discharge today. Refuses bed alarm.  Please review provider order for any additional goals.   Nurse to notify provider when observation goals have been met and patient is ready for discharge.

## 2024-01-15 NOTE — PLAN OF CARE
PRIMARY DIAGNOSIS: VERTIGO    OUTPATIENT/OBSERVATION GOALS TO BE MET BEFORE DISCHARGE  1. Orthostatic performed: N/A    2. Completion of appropriate imaging: No    3. Tolerating PO medications: Yes    4. Return to near baseline physical activity: Yes    5. Cleared for discharge by consultants (if involved): N/A    Discharge Planner Nurse   Safe discharge environment identified: Yes  Barriers to discharge: Yes, continued dizziness       Entered by: Eileen Galarza RN 01/14/2024       Please review provider order for any additional goals.   Nurse to notify provider when observation goals have been met and patient is ready for discharge.

## 2024-01-15 NOTE — PROGRESS NOTES
"[facility] Medicine Cross Cover  1/14/2024 6:52 PM       Called by RN for concerns for anxiety requiring sedation for MRI.  Valium did not touch him.  No reported effects.  Required significant sedation in the ED.  Will premedicate with IV Zyprexa 2.5 mg IV x 1.  May repeat x 1.   Will also have IV Versed 2.5 mg available and may repeat x 1.  Going with Flyer RN to MRI and will be monitored.      ASA class II  Mallampati II  BP (!) 152/73 (BP Location: Left arm)   Pulse 76   Temp 97.7  F (36.5  C) (Oral)   Resp 18   Ht 1.905 m (6' 3\")   Wt 112.3 kg (247 lb 9.2 oz)   SpO2 96%   BMI 30.94 kg/m     Unlabored respirations.  RRR    'JAI Thornton CNP       10:53 PM     MRI reviewed without acute findings.   Patient resting comfortably after sedation for MRI.  Will allow to rest.  Notified RN of results and these can be passed along to the patient should he awaken and inquire overnight.      JAI Thornton CNP   "

## 2024-01-17 ENCOUNTER — PATIENT OUTREACH (OUTPATIENT)
Dept: CARE COORDINATION | Facility: CLINIC | Age: 64
End: 2024-01-17
Payer: COMMERCIAL

## 2024-01-17 NOTE — PROGRESS NOTES
[facility] Resource Center Contact  [facility]/Voicemail     Clinical Data: Post-Discharge Outreach     Outreach attempted x 2.  No answer, unable to lvm, If Pt called to check incase they have any questions/concerns and/or to schedule an appt with an [facility] provider, if they do not have a PCP.      Plan:  [facility] will do no further outreaches at this time.       PERLITA Green  [facility], [facility]    *Connected Care Resource Team does NOT follow patient ongoing. Referrals are identified based on internal discharge reports and the outreach is to ensure patient has an understanding of their discharge instructions.

## 2024-01-17 NOTE — DISCHARGE SUMMARY
[facility]  Hospitalist Discharge Summary      Date of Admission:  1/12/2024  Date of Discharge:  1/15/2024  4:40 PM  Discharging Provider: Shayy Lynch PA-C  Discharge Service: Hospitalist Service    Discharge Diagnoses   Peripheral Vertigo, Vestibular Neuritis  Recent Viral Upper Respiratory Infection     Follow-ups Needed After Discharge   Follow-up Appointments     Follow-up and recommended labs and tests       Follow up with primary care provider, Eulogio Yuen, within 7-14 days   for hospital follow- up.            Unresulted Labs Ordered in the Past 30 Days of this Admission       No orders found from 12/13/2023 to 1/13/2024.        These results will be followed up by n/a    Discharge Disposition   Discharged to home  Condition at discharge: Stable    Hospital Course Gonzalez Beard is a 63 year old male with PMH including HTN, HLD, tobacco dependence, GERD, LUIS E, diverticulitis, who was admitted on 1/12/2024 after presenting for evaluation of room spinning dizziness and gait instability following viral upper respiratory syndrome.    He had viral URI/cold symptoms for 1.5 week PTA.  Presented with positional vertigo, room spinning sensation. This was accompanied by episode of nausea multiple rounds of vomiting starting this morning it is worse when he looked upwards or stood up.  There had symptoms like this before.  Reported some vertical diplopia on the ER.  Denies headache, hearing loss or tinnitus, paresthesias, focal weakness.  Brain MRI obtained which was negative for acute CVA. Admitted to observation treated with anti emetics.     There was report of double vision symptoms in the ED thus MRI was repeated during hospital stay and showed no stroke or acute pathology. No visual symptoms or deficits appreciated on vision exam when assumed care 1.14. Symptoms improved with prednisone.  Suspect vestibular neuritis given history of triggered by recent viral URI, left sided  nystagmus. Diagnosis fits by clinical exam and history. No e/o BPPV or tinnitus but did have fullness in ear. Per UTD 10 day course prednisone given his ongoing symptoms refractory to meclizine alone. outpatient vestibular PT referred.     Problem List:     Dizziness, suspect Peripheral Vertigo  Had viral URI/cold symptoms for 1.5 week PTA.  Presented with positional vertigo, room spinning sensation. This was accompanied by episode of nausea multiple rounds of vomiting starting this morning it is worse when he looked upwards or stood up.  There had symptoms like this before.  Reported some vertical diplopia on the ER.  Denies headache, hearing loss or tinnitus, paresthesias, focal weakness.  Brain MRI obtained which was negative for acute CVA. Admitted to observation treated with anti emetics.   -Suspect peripheral vertigo, vestibular neuritis given history of triggered by recent viral URI, left sided nystagmus. Diagnosis fits by clinical exam and history. Per UTD 10 day course prednisone given his ongoing symptoms refractory to meclizine alone   -continue symptom management: scopolamine patch, meclizine PRN  -outpatient vestibular PT     Recent viral URI: Scaterred rhonchi on exam with cough. Continue supportive cares. outpatient Pfts recommended with PCP.     HTN: Denies currently being on any blood pressure medications.  Blood pressure quite elevated at 185/99 in the setting of vomiting that improved to 147/90 without intervention.     HLD: Can hold his simvastatin while here, observation status.     GERD: Resume pantoprazole 40 mg daily.     Tobacco dependence: NRT if requested. Wheezing on exam, no hypoxia. Suspect rhonchi triggered by recent viral URI.      LUIS E: cont home cpap.      Problem List:    Consultations This Hospital Stay   None    Code Status   Prior    Time Spent on this Encounter   Shayy CORTEZ PA-C, personally saw the patient today and spent greater than 30 minutes discharging this  "patient.       Shayy Lynch PA-CHON  ______________________________________________________________________    Interval History  Needed sedation for MRI repeated yesterday. No nausea, vomiting. No double vision, blurred vision. Room spinning dizziness better but does still have a degree of this. Completely independent with ambulation. Requested shower while he was in the hospital but was able to get up and shave himself in the bathroom independently.  Tolerating regular diet. No nausea, vomiting, headache. No limb weakness. No CP, dyspnea.     Physical Exam   Vital Signs:                  BP (!) 162/93 (BP Location: Right arm)   Pulse 69   Temp 97.6  F (36.4  C) (Oral)   Resp 16   Ht 1.905 m (6' 3\")   Wt 112.3 kg (247 lb 9.2 oz)   SpO2 97%   BMI 30.94 kg/m      Weight: 247 lbs 9.23 oz    Constitutional: Awake, alert, no apparent distress  HEENT: Nasal congestion. No reproducible nystagmus.   Respiratory:  Normal work of breathing. Scattered rhonchi bilaterally.   Neuro: No focal deficits. Moving all extremities with normal strength. Coordination and sensation grossly intact. Speech clear. No focal deficits. No double vision on visual acuity exam.   Psych: Appropriate affect.         Primary Care Physician   Eulogio Yuen    Discharge Orders      Physical Therapy Referral      Reason for your hospital stay    Vertigo, suspected vestibular neuritis. MRI without stroke. Treated with steroids, vestibular PT and meclizine. Anticipate ongoing improvement.     Follow-up and recommended labs and tests     Follow up with primary care provider, Eulogio Yuen, within 7-14 days for hospital follow- up.     Activity    Your activity upon discharge: activity as tolerated take extra time going from sitting to standing position, no driving while acutely dizzy.     Discharge Instructions    Prednisone: (60 mg) by mouth daily for 3 days starting 1/16, THEN 4 tablets (40 mg) daily for 1 day, THEN 3 tablets (30 mg) daily " for 1 day, THEN 2 tablets (20 mg) daily for 1 day, THEN 1 tablet (10 mg) daily for 1 day.     Diet    Follow this diet upon discharge: Orders Placed This Encounter      Regular Diet Adult       Significant Results and Procedures   Results for orders placed or performed during the hospital encounter of 01/12/24   MR Brain w/o Contrast    Narrative    EXAM: MR BRAIN W/O CONTRAST  LOCATION: [facility]  DATE: 1/12/2024    INDICATION: VERTIGO, DIPLOPIA WITH LOOKING UP  COMPARISON: None.  TECHNIQUE: Routine multiplanar multisequence head MRI without intravenous contrast.    FINDINGS: Motion degraded exam.  INTRACRANIAL CONTENTS: No acute or subacute infarct. No mass, acute hemorrhage, or extra-axial fluid collections. Scattered nonspecific T2/FLAIR hyperintensities within the cerebral white matter most consistent with mild chronic microvascular ischemic   change. Normal ventricles and sulci. Normal position of the cerebellar tonsils.     SELLA: No abnormality accounting for technique.    OSSEOUS STRUCTURES/SOFT TISSUES: Normal marrow signal. The major intracranial vascular flow voids are maintained.     ORBITS: No abnormality accounting for technique.     SINUSES/MASTOIDS: Mild mucosal thickening scattered about the paranasal sinuses. No middle ear or mastoid effusion.       Impression    IMPRESSION:  1.  No acute intracranial process.  2.  Mild chronic microvascular ischemic changes.   MR Brain w/o Contrast    Narrative    EXAM: MR BRAIN W/O CONTRAST  LOCATION: [facility]  DATE: 1/14/2024    INDICATION: evaluate for posterior circulation stroke, ongoing dizziness and hx of nonspecific double vision  COMPARISON: 01/12/2024  TECHNIQUE: Routine multiplanar multisequence head MRI without intravenous contrast.    FINDINGS: Mildly motion degraded exam.  INTRACRANIAL CONTENTS: No acute or subacute infarct. No mass, acute hemorrhage, or extra-axial fluid collections. Scattered  nonspecific T2/FLAIR hyperintensities within the cerebral white matter most consistent with mild chronic microvascular ischemic   change. Normal ventricles and sulci. Normal position of the cerebellar tonsils.     SELLA: No abnormality accounting for technique.    OSSEOUS STRUCTURES/SOFT TISSUES: Normal marrow signal. The major intracranial vascular flow voids are maintained.     ORBITS: No abnormality accounting for technique.     SINUSES/MASTOIDS: Mild mucosal thickening scattered about the paranasal sinuses. No middle ear or mastoid effusion.       Impression    IMPRESSION:  1.  No acute intracranial process.  2.  Mild chronic microvascular ischemic changes.       Discharge Medications   Discharge Medication List as of 1/15/2024 12:22 PM        START taking these medications    Details   meclizine (ANTIVERT) 25 MG tablet Take 1-2 tablets (25-50 mg) by mouth 4 times daily as needed for dizziness or nausea, Disp-20 tablet, R-0, E-Prescribe      ondansetron (ZOFRAN) 4 MG tablet Take 1 tablet (4 mg) by mouth every 8 hours as needed for nausea or vomiting, Disp-12 tablet, R-0, E-Prescribe      scopolamine (TRANSDERM) 1 MG/3DAYS 72 hr patch Place 1 patch onto the skin every 72 hours, Disp-4 patch, R-0, E-Prescribe      diazepam (VALIUM) 5 MG tablet Take 1 tablet (5 mg) by mouth every 8 hours as needed for nausea (severe dizziness intractable to meclizine), Disp-9 tablet, R-0, Local Print           CONTINUE these medications which have CHANGED    Details   predniSONE (DELTASONE) 10 MG tablet Take 6 tablets (60 mg) by mouth daily for 3 days, THEN 4 tablets (40 mg) daily for 1 day, THEN 3 tablets (30 mg) daily for 1 day, THEN 2 tablets (20 mg) daily for 1 day, THEN 1 tablet (10 mg) daily for 1 day., Disp-28 tablet, R-0, E-Prescribe           CONTINUE these medications which have NOT CHANGED    Details   aspirin 81 MG EC tablet Take 81 mg by mouth once, Historical      pantoprazole (PROTONIX) 40 MG EC tablet Take 1 tablet  (40 mg) by mouth daily, Disp-90 tablet, R-3, E-Prescribe      simvastatin (ZOCOR) 20 MG tablet Take 1 tablet (20 mg) by mouth At Bedtime, Disp-90 tablet, R-3, E-Prescribe           Allergies   No Known Allergies

## 2024-01-18 ENCOUNTER — THERAPY VISIT (OUTPATIENT)
Dept: PHYSICAL THERAPY | Facility: CLINIC | Age: 64
End: 2024-01-18
Attending: PHYSICIAN ASSISTANT
Payer: COMMERCIAL

## 2024-01-18 ENCOUNTER — MYC MEDICAL ADVICE (OUTPATIENT)
Dept: FAMILY MEDICINE | Facility: CLINIC | Age: 64
End: 2024-01-18
Payer: COMMERCIAL

## 2024-01-18 DIAGNOSIS — R42 VERTIGO: ICD-10-CM

## 2024-01-18 DIAGNOSIS — R42 VERTIGO: Primary | ICD-10-CM

## 2024-01-18 PROCEDURE — 97161 PT EVAL LOW COMPLEX 20 MIN: CPT | Mod: GP | Performed by: PHYSICAL THERAPIST

## 2024-01-18 PROCEDURE — 97112 NEUROMUSCULAR REEDUCATION: CPT | Mod: GP | Performed by: PHYSICAL THERAPIST

## 2024-01-18 NOTE — PROGRESS NOTES
PHYSICAL THERAPY EVALUATION  Type of Visit: Evaluation    See electronic medical record for Abuse and Falls Screening details.    Subjective       Presenting condition or subjective complaint: Vertigo / dizzy / double vision  Date of onset: 01/12/24    Relevant medical history:     Dates & types of surgery: Colon resection Sept. 2017    Prior diagnostic imaging/testing results: MRI     Prior therapy history for the same diagnosis, illness or injury: No      VESTIBULAR EVALUATION  ADDITIONAL HISTORY:  Description of symptoms: Constant dizziness; Off balance; Blurry or jumping vision; Light-headedness  Dizzy attacks:   Start: January 12   Last attack: current   Frequency of occurrences: 1 time   Length of attack: been since January 12  Difficulty hearing:    Noise in ears? No    Alleviates symptoms:    Worsens symptoms:    Activities that bring on symptoms:       Sudn onset dizziness spinnigneind. Spinnign diziznes stopped when in the EDstoped on [place]. Nod osubelid vision dizine off balnce and lburrivedi ons. Taking preinsoined taper 10thths   Had a patch behign ear and taking meclinsined   Nauea better since ED vision.  Fell in garage yesrday into a pile of cloaathes able to ge tback up no injury  No inssue siht balnce befure this.  Never had vertigo before.  No hearingineidn changeind aurafullnes or tininitus.  No spinning in bed.  Noother otherseid in hosital.  Doenst do aynthign at home worriebd about hsi balane   Sitting or laying most days    Was driving, retiniend, computer work running errands and gout out to lunch , cabin up noth, no exercise since reuntiend. Suing the computer.  Spouse reports always has eye twitch history.     No further follow-up with aud/ENT    Pertinent history of current vestibular problem:   DHI: Total Score: 64    Prior Level of Function  Transfers:   Ambulation:   ADL:   IADL:     Living Environment  Social support: With a significant other or spouse   Type of home: House;  Multi-level   Stairs to enter the home: Yes   Is there a railing: Yes   Ramp: No   Stairs inside the home: Yes 21 Is there a railing: Yes   Help at home: Self Cares (home health aide/personal care attendant, family, etc); Home management tasks (cooking, cleaning); Medication and/or finances  Equipment owned: Four-point cane     Employment: No    Hobbies/Interests:      Patient goals for therapy: Get back to normal    Pain assessment:      Objective      Cognitive Status Examination  Orientation:    Level of Consciousness:   Follows Commands and Answers Questions:   Personal Safety and Judgement:   Memory:     INTEGUMENTARY:   POSTURE:   RANGE OF MOTION:   STRENGTH:     BED MOBILITY:     TRANSFERS:       GAIT:   Level of [place]:   Assistive Device(s):   Gait Deviations:       BALANCE:         SENSATION:     REFLEXES:   MUSCLE TONE:            Cervicogenic Screen    Neck ROM    Vertebral Artery Test    Alar Ligament Test    Transverse Ligament Test    Distraction    Neck Torsion Test (head still, body rotating)    Neck Torsion Test (head and body rotating)         Oculomotor Screen    Ocular ROM    Smooth Pursuit    Saccades    VOR    VOR Cancellation    Head Impulse Test    Convergence Testing         Infrared Goggle Exam Vestibular Suppressant in Last 24 Hours? Yes Melicniend 1145 am, predisone  Exam Completed With: Infrared goggles   Spontaneous Nystagmus Horizontal L no dzzi   Gaze Evoked Nystagmus Horizontal L all planes   Head Shake Horizontal Nystagmus Horizontal L stonger no diziness     Positional Testing    Supine Head-Hanging Test     Left Right   Rustam-Hallpike Horizontal L Horizontal L   Sidelying Test     [facility] Supine Roll Test Horizontal L Horizontal L   [facility] Forward Roll Test     [place]enne[place] and Lean Test -  Sitting Erect    K[place] and Lean Test - Seated, Head Bent 60 Degrees Forward    [place] and Lean Test - Seated, Head Bent Backwards    No dziine positoanieod lchangiend no dizizn sin laying persinsitnany  nstagmus    BPPV Canal(s):   BPPV Type:     Dynamic Visual Acuity (DVA)    Static Acuity (LogMar) 20/40    Horizontal Head Movement at 1 Hz (LogMar)    Horizontal Head Movement at 2 Hz (LogMar) unable to read top line 20/10          Ed on poster with restined nsitngine followign repsenidn and exerinciens   Movem ne no meclisnined.   Reports doubelid visoin   Ed to use treking pole prn.  Adking abouted dtravel - pokay. Stuart deindine probilbtail of BPPV    4 item DGI  Wihtout AD 4.6 sec no SD no veering  3.66 sec with speed no instbialeid  No dizinzes  Milde veering with head turns    Romber EO 30 sec   Romebr EOC on faom 3 sec 2x     Assessment & Plan   CLINICAL IMPRESSIONS  Medical Diagnosis: Vertigo (R42)    Treatment Diagnosis: (+) s/s acute R UVH   Impression/Assessment: Patient is a 63 year old male with ongoing dizziness and balance limitations following acute onset of vertigo.  The following significant findings have been identified:  (+) L beating nystagmus throughout IR exam consistent with R UVH, impaired gaze stability, gait stability with head movement, impaired postural stability with increased vestibular utilization . These impairments interfere with their ability to perform self care tasks, work tasks, recreational activities, household chores, driving , household mobility, and community mobility as compared to previous level of function.     Clinical Decision Making (Complexity):  Clinical Presentation: Stable/Uncomplicated  Clinical Presentation Rationale: based on medical and personal factors listed in PT evaluation  Clinical Decision Making (Complexity): Low complexity    PLAN OF CARE  Treatment Interventions:  Interventions: Neuromuscular Re-education    Long Term Goals     PT Goal 1  Goal Identifier: DHI  Goal Description: Pt to score 0/100 per DHI with resolved sxs of dizziness >/= 24-48 hours for return to PLOF without sxs of dizziness, and improved quality of life.  Goal Progress: at eval,  1/18/24: 64/100  Target Date: 03/04/24  PT Goal 2  Goal Identifier: VOR  Goal Description: Pt to demonstrate improved VOR per DVA assessment to WNL with 1-2 line difference without dizziness with quick funtional head or body turns.  Goal Progress: at [facility], 1/18/24: >4 line difference between static and dynamic visual acuity 2hz, static acuity impaired 20/40, unable to read 20/100 (top line) at 2 hz  Target Date: 03/04/24  PT Goal 3  Goal Identifier: Postural stability - mCTSIB  Goal Description: Pt to maintain romberg foam EC x30 sec without LOB to demonstrate improved postural stability with increased vestibular utilization and improved balance with EC/onsteady surfaces for daily tasks (ie showering).  Goal Progress: at [facility], 1/18/14: 3 sec x2 reps with LOB  Target Date: 03/04/24  PT Goal 4  Goal Identifier: Gait stability - FGA  Goal Description: Pt to score >/= 28/30 to be considered WNL for age-matched norms without path diviations or dizziness with head movements with ambulation  Goal Progress: at [facility], 1/18/24: mild veering with head turns  Target Date: 03/04/24      Frequency of Treatment: 1x/week  Duration of Treatment: 6 weeks    Recommended Referrals to Other Professionals:   Education Assessment:   Education Comments: Ed on (+) s/s unilateral vestibular hypofunction. Negative for BPPV.  Differential dx of UVH vs BPPV with reference to IR exam recording and posterior of inner ear anatomy. Education on vestibular neuritis as etiology of UVH. Education on PT txt of UVH with VOR exs following resolution of acute resting nystagmus.    Risks and benefits of evaluation/treatment have been explained.   Patient/Family/caregiver agrees with Plan of Care.     Evaluation Time:     PT Bret Low Complexity Minutes (15186): 47       Signing Clinician: Kelly Adrian, PT

## 2024-01-22 RX ORDER — SCOLOPAMINE TRANSDERMAL SYSTEM 1 MG/1
1 PATCH, EXTENDED RELEASE TRANSDERMAL
Qty: 6 PATCH | Refills: 1 | Status: SHIPPED | OUTPATIENT
Start: 2024-01-22 | End: 2024-01-22

## 2024-01-22 RX ORDER — MECLIZINE HYDROCHLORIDE 25 MG/1
25 TABLET ORAL 3 TIMES DAILY PRN
Qty: 40 TABLET | Refills: 1 | Status: SHIPPED | OUTPATIENT
Start: 2024-01-22

## 2024-01-22 RX ORDER — SCOLOPAMINE TRANSDERMAL SYSTEM 1 MG/1
1 PATCH, EXTENDED RELEASE TRANSDERMAL
Qty: 5 PATCH | Refills: 0 | Status: SHIPPED | OUTPATIENT
Start: 2024-01-22 | End: 2024-03-18

## 2024-01-22 NOTE — TELEPHONE ENCOUNTER
Central Prior Authorization Team   Phone: [phone]    PA Initiation    Medication: Scopolamine 1mg patch  Insurance Company: [employer] - Phone [phone] Fax [phone]  Pharmacy [place]illing the Rx[place] [facility] [place] [address] [place] [address]  Filling Pharmacy Phone: [phone]  Filling Pharmacy Fax:    Start Date: 1/22/2024

## 2024-01-22 NOTE — TELEPHONE ENCOUNTER
Central Prior Authorization Team   Phone: [phone]    PA Initiation    Medication: Scopolamine 1mg patch  Insurance Company: [employer] - Phone [phone] Fax [phone]  Pharmacy Filling the Rx: [facility] [place] - [address] [place] [address]  Filling Pharmacy Phone: [phone]  Filling Pharmacy Fax:    Start Date: 1/22/2024

## 2024-01-22 NOTE — TELEPHONE ENCOUNTER
Verified with pharmacy patient has already picked up 1/12 rx of Meclizine.     Mychart sent to patient asking how much has he needed.

## 2024-01-23 ENCOUNTER — THERAPY VISIT (OUTPATIENT)
Dept: PHYSICAL THERAPY | Facility: CLINIC | Age: 64
End: 2024-01-23
Attending: PHYSICIAN ASSISTANT
Payer: COMMERCIAL

## 2024-01-23 DIAGNOSIS — R42 VERTIGO: Primary | ICD-10-CM

## 2024-01-23 PROCEDURE — 97112 NEUROMUSCULAR REEDUCATION: CPT | Mod: GP | Performed by: PHYSICAL THERAPIST

## 2024-01-23 NOTE — TELEPHONE ENCOUNTER
PRIOR AUTHORIZATION DENIED    Medication: Scopolamine 1mg patch    Denial Date: 1/23/2024    Denial Rational:  Patient must have a history of trial & failure to the formulary alternative(s) or have a contraindication or intolerance to the formulary alternatives:      Appeal Information:    If you would like to appeal, please supply P/A team with a letter of medical necessity with clinical reason.

## 2024-01-23 NOTE — TELEPHONE ENCOUNTER
Spoke to pt and advised what provider stated about 4 patches with Goodrx coupon.  He understood and requested the rx be made at the [place] pharmacy for him to pu.    Forwarded a message to provider regarding the rx as well.    Linda BRAY

## 2024-01-23 NOTE — TELEPHONE ENCOUNTER
Please let him know his insurance does not cover this.  4 patches are about 25 dollars hyvee with [employer] coupon.  I can send it there if he would like.

## 2024-02-03 ENCOUNTER — HEALTH MAINTENANCE LETTER (OUTPATIENT)
Age: 64
End: 2024-02-03

## 2024-02-07 ENCOUNTER — THERAPY VISIT (OUTPATIENT)
Dept: PHYSICAL THERAPY | Facility: CLINIC | Age: 64
End: 2024-02-07
Attending: PHYSICAL MEDICINE & REHABILITATION
Payer: COMMERCIAL

## 2024-02-07 DIAGNOSIS — R42 VERTIGO: Primary | ICD-10-CM

## 2024-02-07 PROCEDURE — 97112 NEUROMUSCULAR REEDUCATION: CPT | Mod: GP | Performed by: PHYSICAL THERAPIST

## 2024-02-13 ENCOUNTER — THERAPY VISIT (OUTPATIENT)
Dept: PHYSICAL THERAPY | Facility: CLINIC | Age: 64
End: 2024-02-13
Attending: PHYSICAL MEDICINE & REHABILITATION
Payer: COMMERCIAL

## 2024-02-13 ENCOUNTER — MYC MEDICAL ADVICE (OUTPATIENT)
Dept: FAMILY MEDICINE | Facility: CLINIC | Age: 64
End: 2024-02-13

## 2024-02-13 DIAGNOSIS — R42 VERTIGO: Primary | ICD-10-CM

## 2024-02-13 DIAGNOSIS — Z87.891 PERSONAL HISTORY OF TOBACCO USE: Primary | ICD-10-CM

## 2024-02-13 PROCEDURE — 97112 NEUROMUSCULAR REEDUCATION: CPT | Mod: GP | Performed by: PHYSICAL THERAPIST

## 2024-02-13 PROCEDURE — G0296 VISIT TO DETERM LDCT ELIG: HCPCS | Performed by: FAMILY MEDICINE

## 2024-02-14 DIAGNOSIS — E78.5 HYPERLIPIDEMIA LDL GOAL <100: ICD-10-CM

## 2024-02-14 RX ORDER — SIMVASTATIN 20 MG
20 TABLET ORAL AT BEDTIME
Qty: 90 TABLET | Refills: 3 | Status: SHIPPED | OUTPATIENT
Start: 2024-02-14 | End: 2024-03-18

## 2024-02-14 NOTE — TELEPHONE ENCOUNTER
LOV 12/15/2022      Appointments in Next Year      Mar 07, 2024  8:15 AM  Vestibular Treatment with Kelly Adrian PT  [facility] [phone]     Mar 18, 2024  8:40 AM  (Arrive by 8:20 AM)  Adult Preventative Visit with Eulogio Yuen MD  [facility] ([facility] [place] ) [phone]              Please see my chart message below     Please review and advise     Thank you     Arabella Ontiveros RN, BSN  [place] [facility]

## 2024-02-15 NOTE — TELEPHONE ENCOUNTER
Lung Cancer Screening Shared Decision Making Visit     Gonzalez Beard, a 63 year old male, is eligible for lung cancer screening    History   Smoking Status     Every Day     Packs/day: 0.80     Years: 25.00     Types: Cigarettes   Smokeless Tobacco     Former     Quit date: 4/30/2010       I have discussed with patient the risks and benefits of screening for lung cancer with low-dose CT.     The risks include:    radiation exposure: one low dose chest CT has as much ionizing radiation as about 15 chest x-rays, or 6 months of background radiation living in [place]      false positives: most findings/nodules are NOT cancer, but some might still require additional diagnostic evaluation, including biopsy    over-diagnosis: some slow growing cancers that might never have been clinically significant will be detected and treated unnecessarily     The benefit of early detection of lung cancer is contingent upon adherence to annual screening or more frequent follow up if indicated.     Furthermore, to benefit from screening, Gonzalez must be willing and able to undergo diagnostic procedures, if indicated. Although no specific guide is available for determining severity of comorbidities, it is reasonable to withhold screening in patients who have greater mortality risk from other diseases.     We did discuss that the best way to prevent lung cancer is to not smoke.    Some patients may value a numeric estimation of lung cancer risk when evaluating if lung cancer screening is right for them, here is one calculator:    ShouldIScreen

## 2024-02-15 NOTE — PATIENT INSTRUCTIONS
Lung Cancer Screening   Frequently Asked Questions  If you are at high-risk for lung cancer, getting screened with low-dose computed tomography (LDCT) every year can help save your life. This handout offers answers to some of the most common questions about lung cancer screening. If you have other questions, please call [phone] ([phone]).     What is it?  Lung cancer screening uses special X-ray technology to create an image of your lung tissue. The exam is quick and easy and takes less than 10 seconds. We don t give you any medicine or use any needles. You can eat before and after the exam. You don t need to change your clothes as long as the clothing on your chest doesn t contain metal. But, you do need to be able to hold your breath for at least 6 seconds during the exam.    What is the goal of lung cancer screening?  The goal of lung cancer screening is to save lives. Many times, lung cancer is not found until a person starts having physical symptoms. Lung cancer screening can help detect lung cancer in the earliest stages when it may be easier to treat.    Who should be screened for lung cancer?  We suggest lung cancer screening for anyone who is at high-risk for lung cancer. You are in the high-risk group if you:      are between the ages of 55 and 79, and    have smoked at least 1 pack of cigarettes a day for 20 or more years, and    still smoke or have quit within the past 15 years.    However, if you have a new cough or shortness of breath, you should talk to your doctor before being screened.    Why does it matter if I have symptoms?  Certain symptoms can be a sign that you have a condition in your lungs that should be checked and treated by your doctor. These symptoms include fever, chest pain, a new or changing cough, shortness of breath that you have never felt before, coughing up blood or unexplained weight loss. Having any of these symptoms can greatly affect the results of lung  cancer screening.       Should all smokers get an LDCT lung cancer screening exam?  It depends. Lung cancer screening is for a very specific group of men and women who have a history of heavy smoking over a long period of time (see  Who should be screened for lung cancer  above).  I am in the high-risk group, but have been diagnosed with cancer in the past. Is LDCT lung cancer screening right for me?  In some cases, you should not have LDCT lung screening, such as when your doctor is already following your cancer with CT scan studies. Your doctor will help you decide if LDCT lung screening is right for you.  Do I need to have a screening exam every year?  Yes. If you are in the high-risk group described earlier, you should get an LDCT lung cancer screening exam every year until you are 79, or are no longer willing or able to undergo screening and possible procedures to diagnose and treat lung cancer.  How effective is LDCT at preventing death from lung cancer?  Studies have shown that LDCT lung cancer screening can lower the risk of death from lung cancer by 20 percent in people who are at high-risk.  What are the risks?  There are some risks and limitations of LDCT lung cancer screening. We want to make sure you understand the risks and benefits, so please let us know if you have any questions. Your doctor may want to talk with you more about these risks.    Radiation exposure: As with any exam that uses radiation, there is a very small increased risk of cancer. The amount of radiation in LDCT is small--about the same amount a person would get from a mammogram. Your doctor orders the exam when he or she feels the potential benefits outweigh the risks.    False negatives: No test is perfect, including LDCT. It is possible that you may have a medical condition, including lung cancer, that is not found during your exam. This is called a false negative result.    False positives and more testing: LDCT very often finds  something in the lung that could be cancer, but in fact is not. This is called a false positive result. False positive tests often cause anxiety. To make sure these findings are not cancer, you may need to have more tests. These tests will be done only if you give us permission. Sometimes patients need a treatment that can have side effects, such as a biopsy. For more information on false positives, see  What can I expect from the results?     Findings not related to lung cancer: Your LDCT exam also takes pictures of areas of your body next to your lungs. In a very small number of cases, the CT scan will show an abnormal finding in one of these areas, such as your kidneys, adrenal glands, liver or thyroid. This finding may not be serious, but you may need more tests. Your doctor can help you decide what other tests you may need, if any.  What can I expect from the results?  About 1 out of 4 LDCT exams will find something that may need more tests. Most of the time, these findings are lung nodules. Lung nodules are very small collections of tissue in the lung. These nodules are very common, and the vast majority--more than 97 percent--are not cancer (benign). Most are normal lymph nodes or small areas of scarring from past infections.  But, if a small lung nodule is found to be cancer, the cancer can be cured more than 90 percent of the time. To know if the nodule is cancer, we may need to get more images before your next yearly screening exam. If the nodule has suspicious features (for example, it is large, has an odd shape or grows over time), we will refer you to a specialist for further testing.  Will my doctor also get the results?  Yes. Your doctor will get a copy of your results.  Is it okay to keep smoking now that there s a cancer screening exam?  No. Tobacco is one of the strongest cancer-causing agents. It causes not only lung cancer, but other cancers and cardiovascular (heart) diseases as well. The damage  caused by smoking builds over time. This means that the longer you smoke, the higher your risk of disease. While it is never too late to quit, the sooner you quit, the better.  Where can I find help to quit smoking?  The best way to prevent lung cancer is to stop smoking. If you have already quit smoking, congratulations and keep it up! For help on quitting smoking, please call [employer] at 5-519-QUITNOW (1-781.176.6820) or the American Cancer Society at 1-291.940.7080 to find local resources near you.  One-on-one health coaching:  If you d prefer to work individually with a health care provider on tobacco cessation, we offer:      Medication Therapy Management:  Our specially trained pharmacists work closely with you and your doctor to help you quit smoking.  Call [phone] or [phone] (toll free).

## 2024-03-07 ENCOUNTER — HOSPITAL ENCOUNTER (OUTPATIENT)
Dept: CT IMAGING | Facility: CLINIC | Age: 64
Discharge: HOME | End: 2024-03-07
Attending: FAMILY MEDICINE | Admitting: FAMILY MEDICINE
Payer: COMMERCIAL

## 2024-03-07 DIAGNOSIS — Z87.891 PERSONAL HISTORY OF TOBACCO USE: ICD-10-CM

## 2024-03-07 PROCEDURE — 71271 CT THORAX LUNG CANCER SCR C-: CPT

## 2024-03-07 NOTE — RESULT ENCOUNTER NOTE
Dear Gonzalez,    Here is a summary of your recent test results:  -Lung CT did not show any signs of suspicious lung nodules. ADVISE: Rechecking a lung CT scan in 12 months.    For additional lab test information, www.testing.com is a very good reference.    In addition, here is a list of due or overdue Health Maintenance reminders:  Pneumococcal Vaccine(2 of 2 - PCV) due on 06/15/2019  RSV VACCINE (Pregnancy & 60+)(1 - 1-dose 60+ series) Never done  Flu Vaccine(1) due on 09/01/2023  COVID-19 Vaccine(3 - 2023-24 season) due on 09/01/2023  Talk to your care team about options to quit tobacco use. due on 12/15/2023  Yearly Preventive Visit due on 12/15/2023  Cholesterol Lab due on 12/15/2023  Prostate Test due on 12/15/2023  ANNUAL REVIEW OF HM ORDERS due on 12/15/2023  PHQ-2 (once per calendar year) due on 01/01/2024    Please call us at [phone] (or use crobo) to address the above recommendations if needed.           Thank you very much for trusting me and [facility].     Have a peaceful day.    Healthy regards,  Oracio Yuen MD

## 2024-03-17 SDOH — HEALTH STABILITY: PHYSICAL HEALTH: ON AVERAGE, HOW MANY DAYS PER WEEK DO YOU ENGAGE IN MODERATE TO STRENUOUS EXERCISE (LIKE A BRISK WALK)?: 0 DAYS

## 2024-03-17 SDOH — HEALTH STABILITY: PHYSICAL HEALTH: ON AVERAGE, HOW MANY MINUTES DO YOU ENGAGE IN EXERCISE AT THIS LEVEL?: 0 MIN

## 2024-03-17 NOTE — COMMUNITY RESOURCES LIST (ENGLISH)
March 17, 2024           YOUR PERSONALIZED LIST OF SERVICES & PROGRAMS           & RECREATION    Sports      [employer] [facility] Summer [facility]  [address] [place] [address] (Distance: 9.7 miles)  Phone: [phone]  Website: [email or web address]_youth_sports  Language: English  Fee: Self pay      [facility] - [facility] [employer]  [address] [place] [address] (Distance: 9.3 miles)  Website: http://Contrib/  Language: English, Argentine  Fee: Free      [facility] - Adult Enrichment  Phone: [phone]  Website: https://Pixate/adults-seniors/adult-enrichment/  Language: English  Hours: Mon 7:30 AM - 4:00 PM Tue 7:30 AM - 4:00 PM Wed 7:30 AM - 4:00 PM Thu 7:30 AM - 4:00 PM Fri 7:30 AM - 4:00 PM    Classes/Groups      [facility] [employer] - Group fitness classes  [address] [address] [address] (Distance: 1.6 miles)  Phone: [phone]  Language: English  Fee: Free  Accessibility: Ada accessible, Translation services  Transportation Options: Free transportation      [employer] - Personal training  [address] [place] [address] (Distance: 6.9 miles)  Phone: [phone]  Website: [email or web address]/recreation/community-center  Language: English, Argentine  Fee: Self pay  Accessibility: [employer] accessible      [employer] - Exercise Class/At Home Workouts  Website: https://homeworkouts.org/  Language: English               IMPORTANT NUMBERS & WEBSITES        Emergency Services  911  .   United Way  211 http://211unitedway.org  .   Poison Control  (986) 634-2259 http://[place]poison.org http://[place]poison.org  .     Suicide and Crisis Lifeline  988 http://988lifeline.org  .   Childhelp National Child Abuse Hotline  208.993.4987 http://Childhelphotline.org   .   National Sexual Assault Hotline  (722) 470-7762 (HOPE) http://[facility].org   .     [facility]  Safeline  (570) 967-3033 (RU[place]AY) http://DistractifyruPrairieSmarts.org  .   Pregnancy & Postpartum Support  Call/text [phone]  [place]: http://ppsupport[place].org  [place]: http://psichapters.com/wi  .   Substance Abuse National Helpline ([facility])  934-813-HELP (0390) http://Findtreatment.gov   .                DISCLAIMER: [employer] does not endorse any service providers mentioned in this resource list. [employer] does not guarantee that the services mentioned in this resource list will be available to you or will improve your health or wellness.    [facility]

## 2024-03-18 ENCOUNTER — OFFICE VISIT (OUTPATIENT)
Dept: FAMILY MEDICINE | Facility: CLINIC | Age: 64
End: 2024-03-18
Payer: COMMERCIAL

## 2024-03-18 VITALS
HEIGHT: 75 IN | RESPIRATION RATE: 16 BRPM | HEART RATE: 75 BPM | TEMPERATURE: 97.4 F | DIASTOLIC BLOOD PRESSURE: 80 MMHG | SYSTOLIC BLOOD PRESSURE: 146 MMHG | WEIGHT: 258.7 LBS | OXYGEN SATURATION: 99 % | BODY MASS INDEX: 32.16 KG/M2

## 2024-03-18 DIAGNOSIS — N52.9 ERECTILE DYSFUNCTION, UNSPECIFIED ERECTILE DYSFUNCTION TYPE: ICD-10-CM

## 2024-03-18 DIAGNOSIS — G47.33 OSA (OBSTRUCTIVE SLEEP APNEA): ICD-10-CM

## 2024-03-18 DIAGNOSIS — R42 VERTIGO: ICD-10-CM

## 2024-03-18 DIAGNOSIS — K21.9 GASTROESOPHAGEAL REFLUX DISEASE WITHOUT ESOPHAGITIS: ICD-10-CM

## 2024-03-18 DIAGNOSIS — R03.0 ELEVATED BLOOD PRESSURE READING WITHOUT DIAGNOSIS OF HYPERTENSION: ICD-10-CM

## 2024-03-18 DIAGNOSIS — Z00.00 ROUTINE GENERAL MEDICAL EXAMINATION AT A HEALTH CARE FACILITY: Primary | ICD-10-CM

## 2024-03-18 DIAGNOSIS — Z12.5 SCREENING FOR PROSTATE CANCER: ICD-10-CM

## 2024-03-18 DIAGNOSIS — R63.5 WEIGHT GAIN: ICD-10-CM

## 2024-03-18 DIAGNOSIS — E78.5 HYPERLIPIDEMIA LDL GOAL <100: ICD-10-CM

## 2024-03-18 LAB
ALBUMIN SERPL BCG-MCNC: 4.2 G/DL (ref 3.5–5.2)
ALP SERPL-CCNC: 118 U/L (ref 40–150)
ALT SERPL W P-5'-P-CCNC: 28 U/L (ref 0–70)
ANION GAP SERPL CALCULATED.3IONS-SCNC: 11 MMOL/L (ref 7–15)
AST SERPL W P-5'-P-CCNC: 23 U/L (ref 0–45)
BILIRUB SERPL-MCNC: 0.4 MG/DL
BUN SERPL-MCNC: 12.8 MG/DL (ref 8–23)
CALCIUM SERPL-MCNC: 9.1 MG/DL (ref 8.8–10.2)
CHLORIDE SERPL-SCNC: 104 MMOL/L (ref 98–107)
CHOLEST SERPL-MCNC: 181 MG/DL
CREAT SERPL-MCNC: 0.99 MG/DL (ref 0.67–1.17)
DEPRECATED HCO3 PLAS-SCNC: 23 MMOL/L (ref 22–29)
EGFRCR SERPLBLD CKD-EPI 2021: 86 ML/MIN/1.73M2
ERYTHROCYTE [DISTWIDTH] IN BLOOD BY AUTOMATED COUNT: 13.5 % (ref 10–15)
FASTING STATUS PATIENT QL REPORTED: YES
GLUCOSE SERPL-MCNC: 107 MG/DL (ref 70–99)
HCT VFR BLD AUTO: 40.9 % (ref 40–53)
HDLC SERPL-MCNC: 36 MG/DL
HGB BLD-MCNC: 14.2 G/DL (ref 13.3–17.7)
LDLC SERPL CALC-MCNC: 103 MG/DL
MCH RBC QN AUTO: 28.9 PG (ref 26.5–33)
MCHC RBC AUTO-ENTMCNC: 34.7 G/DL (ref 31.5–36.5)
MCV RBC AUTO: 83 FL (ref 78–100)
NONHDLC SERPL-MCNC: 145 MG/DL
PLATELET # BLD AUTO: 140 10E3/UL (ref 150–450)
POTASSIUM SERPL-SCNC: 4.3 MMOL/L (ref 3.4–5.3)
PROT SERPL-MCNC: 6.8 G/DL (ref 6.4–8.3)
PSA SERPL DL<=0.01 NG/ML-MCNC: 0.92 NG/ML (ref 0–4.5)
RBC # BLD AUTO: 4.91 10E6/UL (ref 4.4–5.9)
SODIUM SERPL-SCNC: 138 MMOL/L (ref 135–145)
TRIGL SERPL-MCNC: 211 MG/DL
TSH SERPL DL<=0.005 MIU/L-ACNC: 2 UIU/ML (ref 0.3–4.2)
WBC # BLD AUTO: 7.5 10E3/UL (ref 4–11)

## 2024-03-18 PROCEDURE — 36415 COLL VENOUS BLD VENIPUNCTURE: CPT | Performed by: FAMILY MEDICINE

## 2024-03-18 PROCEDURE — 85027 COMPLETE CBC AUTOMATED: CPT | Performed by: FAMILY MEDICINE

## 2024-03-18 PROCEDURE — 99396 PREV VISIT EST AGE 40-64: CPT | Performed by: FAMILY MEDICINE

## 2024-03-18 PROCEDURE — 84443 ASSAY THYROID STIM HORMONE: CPT | Performed by: FAMILY MEDICINE

## 2024-03-18 PROCEDURE — 80061 LIPID PANEL: CPT | Performed by: FAMILY MEDICINE

## 2024-03-18 PROCEDURE — 80053 COMPREHEN METABOLIC PANEL: CPT | Performed by: FAMILY MEDICINE

## 2024-03-18 PROCEDURE — G0103 PSA SCREENING: HCPCS | Performed by: FAMILY MEDICINE

## 2024-03-18 RX ORDER — PANTOPRAZOLE SODIUM 40 MG/1
40 TABLET, DELAYED RELEASE ORAL DAILY
Qty: 90 TABLET | Refills: 4 | Status: SHIPPED | OUTPATIENT
Start: 2024-03-18

## 2024-03-18 RX ORDER — SIMVASTATIN 20 MG
20 TABLET ORAL AT BEDTIME
Qty: 90 TABLET | Refills: 4 | Status: SHIPPED | OUTPATIENT
Start: 2024-03-18

## 2024-03-18 RX ORDER — SILDENAFIL 50 MG/1
50-100 TABLET, FILM COATED ORAL DAILY PRN
Qty: 30 TABLET | Refills: 11 | Status: SHIPPED | OUTPATIENT
Start: 2024-03-18

## 2024-03-18 NOTE — PATIENT INSTRUCTIONS
Preventive Care Advice   This is general advice given by our system to help you stay healthy. However, your care team may have specific advice just for you. Please talk to your care team about your preventive care needs.  Nutrition  Eat 5 or more servings of fruits and vegetables each day.  Try wheat bread, brown rice and whole grain pasta (instead of white bread, rice, and pasta).  Get enough calcium and vitamin D. Check the label on foods and aim for 100% of the RDA (recommended daily allowance).  Lifestyle  Exercise at least 150 minutes each week   (30 minutes a day, 5 days a week).  Do muscle strengthening activities 2 days a week. These help control your weight and prevent disease.  No smoking.  Wear sunscreen to prevent skin cancer.  Have a dental exam and cleaning every 6 months.  Yearly exams  See your health care team every year to talk about:  Any changes in your health.  Any medicines your care team has prescribed.  Preventive care, family planning, and ways to prevent chronic diseases.  Shots (vaccines)   HPV shots (up to age 26), if you've never had them before.  Hepatitis B shots (up to age 59), if you've never had them before.  COVID-19 shot: Get this shot when it's due.  Flu shot: Get a flu shot every year.  Tetanus shot: Get a tetanus shot every 10 years.  Pneumococcal, hepatitis A, and RSV shots: Ask your care team if you need these based on your risk.  Shingles shot (for age 50 and up).  General health tests  Diabetes screening:  Starting at age 35, Get screened for diabetes at least every 3 years.  If you are younger than age 35, ask your care team if you should be screened for diabetes.  Cholesterol test: At age 39, start having a cholesterol test every 5 years, or more often if advised.  Bone density scan (DEXA): At age 50, ask your care team if you should have this scan for osteoporosis (brittle bones).  Hepatitis C: Get tested at least once in your life.  STIs (sexually transmitted  infections)  Before age 24: Ask your care team if you should be screened for STIs.  After age 24: Get screened for STIs if you're at risk. You are at risk for STIs (including HIV) if:  You are sexually active with more than one person.  You don't use condoms every time.  You or a partner was diagnosed with a sexually transmitted infection.  If you are at risk for HIV, ask about PrEP medicine to prevent HIV.  Get tested for HIV at least once in your life, whether you are at risk for HIV or not.  Cancer screening tests  Cervical cancer screening: If you have a cervix, begin getting regular cervical cancer screening tests at age 21. Most people who have regular screenings with normal results can stop after age 65. Talk about this with your provider.  Breast cancer scan (mammogram): If you've ever had breasts, begin having regular mammograms starting at age 40. This is a scan to check for breast cancer.  Colon cancer screening: It is important to start screening for colon cancer at age 45.  Have a colonoscopy test every 10 years (or more often if you're at risk) Or, ask your provider about stool tests like a FIT test every year or Cologuard test every 3 years.  To learn more about your testing options, visit: https://[email or web address]/571133.pdf.  For help making a decision, visit: https://bit.ly/na82379.  Prostate cancer screening test: If you have a prostate and are age 55 to 69, ask your provider if you would benefit from a yearly prostate cancer screening test.  Lung cancer screening: If you are a current or former smoker age 50 to 80, ask your care team if ongoing lung cancer screenings are right for you.  For informational purposes only. Not to replace the advice of your health care provider. Copyright   2023 [employer]. All rights reserved. Clinically reviewed by the [facility]itions [facility]. [employer] [id number] - REV 01/24.    Preventing Falls: Care Instructions  Injuries and health  problems such as trouble walking or poor eyesight can increase your risk of falling. So can some medicines. But there are things you can do to help prevent falls. You can exercise to get stronger. You can also arrange your home to make it safer.    Talk to your doctor about the medicines you take. Ask if any of them increase the risk of falls and whether they can be changed or stopped.   Try to exercise regularly. It can help improve your strength and balance. This can help lower your risk of falling.     Practice fall safety and prevention.    Wear low-heeled shoes that fit well and give your feet good support. Talk to your doctor if you have foot problems that make this hard.  Carry a cellphone or wear a medical alert device that you can use to call for help.  Use stepladders instead of chairs to reach high objects. Don't climb if you're at risk for falls. Ask for help, if needed.  Wear the correct eyeglasses, if you need them.    Make your home safer.    Remove rugs, cords, clutter, and furniture from walkways.  Keep your house well lit. Use night-lights in hallways and bathrooms.  Install and use sturdy handrails on stairways.  Wear nonskid footwear, even inside. Don't walk barefoot or in socks without shoes.    Be safe outside.    Use handrails, curb cuts, and ramps whenever possible.  Keep your hands free by using a shoulder bag or backpack.  Try to walk in well-lit areas. Watch out for uneven ground, changes in pavement, and debris.  Be careful in the winter. Walk on the grass or gravel when sidewalks are slippery. Use de-icer on steps and walkways. Add non-slip devices to shoes.    Put grab bars and nonskid mats in your shower or tub and near the toilet. Try to use a shower chair or bath bench when bathing.   Get into a tub or shower by putting in your weaker leg first. Get out with your strong side first. Have a phone or medical alert device in the bathroom with you.   Where can you learn more?  Go to  "https://www.[employer].net/patiented  Enter G117 in the search box to learn more about \"Preventing Falls: Care Instructions.\"  Current as of: July 17, 2023               Content Version: 14.0    9774-9082 [employer].   Care instructions adapted under license by your healthcare professional. If you have questions about a medical condition or this instruction, always ask your healthcare professional. [employer] disclaims any warranty or liability for your use of this information.      Learning About Stress  What is stress?     Stress is your body's response to a hard situation. Your body can have a physical, emotional, or mental response. Stress is a fact of life for most people, and it affects everyone differently. What causes stress for you may not be stressful for someone else.  A lot of things can cause stress. You may feel stress when you go on a job interview, take a test, or run a race. This kind of short-term stress is normal and even useful. It can help you if you need to work hard or react quickly. For example, stress can help you finish an important job on time.  Long-term stress is caused by ongoing stressful situations or events. Examples of long-term stress include long-term health problems, ongoing problems at work, or conflicts in your family. Long-term stress can harm your health.  How does stress affect your health?  When you are stressed, your body responds as though you are in danger. It makes hormones that speed up your heart, make you breathe faster, and give you a burst of energy. This is called the fight-or-flight stress response. If the stress is over quickly, your body goes back to normal and no harm is done.  But if stress happens too often or lasts too long, it can have bad effects. Long-term stress can make you more likely to get sick, and it can make symptoms of some diseases worse. If you tense up when you are stressed, you may develop neck, shoulder, or low " back pain. Stress is linked to high blood pressure and heart disease.  Stress also harms your emotional health. It can make you wright, tense, or depressed. Your relationships may suffer, and you may not do well at work or school.  What can you do to manage stress?  You can try these things to help manage stress:   Do something active. Exercise or activity can help reduce stress. Walking is a great way to get started. Even everyday activities such as housecleaning or yard work can help.  Try yoga or vasquez chi. These techniques combine exercise and meditation. You may need some training at first to learn them.  Do something you enjoy. For example, listen to music or go to a movie. Practice your hobby or do volunteer work.  Meditate. This can help you relax, because you are not worrying about what happened before or what may happen in the future.  Do guided imagery. Imagine yourself in any setting that helps you feel calm. You can use online videos, books, or a teacher to guide you.  Do breathing exercises. For example:  From a standing position, bend forward from the waist with your knees slightly bent. Let your arms dangle close to the floor.  Breathe in slowly and deeply as you return to a standing position. Roll up slowly and lift your head last.  Hold your breath for just a few seconds in the standing position.  Breathe out slowly and bend forward from the waist.  Let your feelings out. Talk, laugh, cry, and express anger when you need to. Talking with supportive friends or family, a counselor, or a cara leader about your feelings is a healthy way to relieve stress. Avoid discussing your feelings with people who make you feel worse.  Write. It may help to write about things that are bothering you. This helps you find out how much stress you feel and what is causing it. When you know this, you can find better ways to cope.  What can you do to prevent stress?  You might try some of these things to help prevent  "stress:  Manage your time. This helps you find time to do the things you want and need to do.  Get enough sleep. Your body recovers from the stresses of the day while you are sleeping.  Get support. Your family, friends, and community can make a difference in how you experience stress.  Limit your news feed. Avoid or limit time on social media or news that may make you feel stressed.  Do something active. Exercise or activity can help reduce stress. Walking is a great way to get started.  Where can you learn more?  Go to https://www.[employer].net/patiented  Enter N032 in the search box to learn more about \"Learning About Stress.\"  Current as of: October 24, 2023               Content Version: 14.0    0985-2087 [employer].   Care instructions adapted under license by your healthcare professional. If you have questions about a medical condition or this instruction, always ask your healthcare professional. [employer] disclaims any warranty or liability for your use of this information.      Learning About Alcohol Use Disorder  What is alcohol use disorder?  Alcohol use disorder means that a person drinks alcohol even though it causes harm to themselves or others. It can range from mild to severe. The more symptoms of this disorder you have, the more severe it may be. People who have it may find it hard to control their use of alcohol.  People who have this disorder may argue with others about how much they're drinking. Their job may be affected because of drinking. They may drink when it's dangerous or illegal, such as when they drive. They also may have a strong need, or craving, to drink. They may feel like they must drink just to get by. Their drinking may increase their risk of getting hurt or being in a car crash.  Over time, drinking too much alcohol may cause health problems. These may include high blood pressure, liver problems, or problems with digestion.  What are the " symptoms?  Maybe you've wondered about your alcohol habits or how to tell if your drinking is becoming a problem.  Here are some of the symptoms of alcohol use disorder. You may have it if you have two or more of the following symptoms:  You drink larger amounts of alcohol than you ever meant to. Or you've been drinking for a longer time than you ever meant to.  You can't cut down or control your use. Or you constantly wish you could cut down.  You spend a lot of time getting or drinking alcohol or recovering from its effects.  You have strong cravings for alcohol.  You can no longer do your main jobs at work, at school, or at home.  You keep drinking alcohol, even though your use hurts your relationships.  You have stopped doing important activities because of your alcohol use.  You drink alcohol in situations where doing so is dangerous.  You keep drinking alcohol even though you know it's causing health problems.  You need more and more alcohol to get the same effect, or you get less effect from the same amount over time. This is called tolerance.  You have uncomfortable symptoms when you stop drinking alcohol or use less. This is called withdrawal.  Alcohol use disorder can range from mild to severe. The more symptoms you have, the more severe the disorder may be.  You might not realize that your drinking is a problem. You might not drink large amounts when you drink. Or you might go for days or weeks between drinking episodes. But even if you don't drink very often, your drinking could still be harmful and put you at risk.  How is alcohol use disorder treated?  Getting help is up to you. But you don't have to do it alone. There are many people and kinds of treatments that can help.  Treatment for alcohol use disorder can include:  Group therapy, one or more types of counseling, and alcohol education.  Medicines that help to:  Reduce withdrawal symptoms and help you safely stop drinking.  Reduce cravings for  "alcohol.  Support groups. These groups include Alcoholics Anonymous and [facility] (Self-Management and Recovery Training).  Some people are able to stop or cut back on drinking with help from a counselor. People who have moderate to severe alcohol use disorder may need medical treatment. They may need to stay in a hospital or treatment center.  You may have a treatment team to help you. This team may include a psychologist or psychiatrist, counselors, doctors, social workers, nurses, and a . A  helps plan and manage your treatment.  Follow-up care is a key part of your treatment and safety. Be sure to make and go to all appointments, and call your doctor if you are having problems. It's also a good idea to know your test results and keep a list of the medicines you take.  Where can you learn more?  Go to https://www.[employer].net/patiented  Enter H758 in the search box to learn more about \"Learning About Alcohol Use Disorder.\"  Current as of: November 15, 2023               Content Version: 14.0    7928-2628 [employer].   Care instructions adapted under license by your healthcare professional. If you have questions about a medical condition or this instruction, always ask your healthcare professional. [employer] disclaims any warranty or liability for your use of this information.      To schedule a blood pressure followup appointment at a [facility] go to:   [email or web address]       "

## 2024-03-18 NOTE — PROGRESS NOTES
"Preventive Care Visit  [facility]  Eulogio Yuen MD, Family Medicine  Mar 18, 2024        Assessment & Plan     Routine general medical examination at a health care facility    Gastroesophageal reflux disease without esophagitis  Recent EGD, GI recommended lifelong PPI and will refill:  - pantoprazole (PROTONIX) 40 MG EC tablet  Dispense: 90 tablet; Refill: 4  - CBC with platelets  - CBC with platelets    Hyperlipidemia LDL goal <100  Controlled - continue medication(s).  - Lipid panel reflex to direct LDL Non-fasting  - simvastatin (ZOCOR) 20 MG tablet  Dispense: 90 tablet; Refill: 4  - Lipid panel reflex to direct LDL Non-fasting    LUIS E (obstructive sleep apnea)  Continue CPAP    Vertigo  Some improvement  Some ongoing symptoms, continue with vestibular rehab    Weight gain  Weight gain, less active, eating more with travels since [place].  - TSH with free T4 reflex  - TSH with free T4 reflex    Elevated blood pressure reading without diagnosis of hypertension  BP elevated today, will recheck in 1 to 2 weeks at pharmacy.  - Comprehensive metabolic panel (BMP + Alb, Alk Phos, ALT, AST, Total. Bili, TP)  - Comprehensive metabolic panel (BMP + Alb, Alk Phos, ALT, AST, Total. Bili, TP)    Screening for prostate cancer  - PROSTATE SPEC ANTIGEN SCREEN  - PROSTATE SPEC ANTIGEN SCREEN    Erectile dysfunction, unspecified erectile dysfunction type  Stable - continue medication(s).  - sildenafil (VIAGRA) 50 MG tablet  Dispense: 30 tablet; Refill: 11      Nicotine/Tobacco Cessation  He reports that he has been smoking cigarettes. He has a 12.5 pack-year smoking history. He quit smokeless tobacco use about 13 years ago.  Nicotine/Tobacco Cessation Plan  Self help information given to patient      BMI  Estimated body mass index is 32.34 kg/m  as calculated from the following:    Height as of this encounter: 1.905 m (6' 3\").    Weight as of this encounter: 117.3 kg (258 lb 11.2 oz).   Weight " management plan: Discussed healthy diet and exercise guidelines      Reviewed preventive health counseling, as reflected in patient instructions    No follow-ups on file.    Follow-up Visit   Expected date:  Mar 18, 2025 (Approximate)      Follow Up Appointment Details:     Follow-up with whom?: PCP    Follow-Up for what?: Adult Preventive    Any Additional Chronic Condition Management?: Hyperlipidemia    How?: In Person                         Oracio Yuen MD     [address] [address]  [facility]     Office: [phone]         Arnaldo Roth is a 63 year old, presenting for the following:  Physical (Fasting)         Health Care Directive  Patient does not have a Health Care Directive or Living Will: Discussed advance care planning with patient; however, patient declined at this time.    HPI    Hyperlipidemia Follow-Up    Are you regularly taking any medication or supplement to lower your cholesterol?   Yes- every night  Are you having muscle aches or other side effects that you think could be caused by your cholesterol lowering medication?  No        3/17/2024   General Health   How would you rate your overall physical health? Good   Feel stress (tense, anxious, or unable to sleep) Only a little   (!) STRESS CONCERN      3/17/2024   Nutrition   Three or more servings of calcium each day? (!) NO   Diet: Regular (no restrictions)   How many servings of fruit and vegetables per day? (!) 2-3   How many sweetened beverages each day? 0-1         3/17/2024   Exercise   Days per week of moderate/strenous exercise 0 days   Average minutes spent exercising at this level 0 min   (!) EXERCISE CONCERN      3/17/2024   Social Factors   Worry food won't last until get money to buy more No   Food not last or not have enough money for food? No   Do you have housing?  Yes   Are you worried about losing your housing? No   Lack of transportation? No   Unable to get  utilities (heat,electricity)? No         3/18/2024   Fall Risk   Gait Speed Test (Document in seconds) 3.28   Gait Speed Test Interpretation Less than or equal to 5.00 seconds - PASS          3/17/2024   Dental   Dentist two times every year? Yes         3/17/2024   TB Screening   Were you born outside of [place]?  No         Today's PHQ-2 Score:       3/17/2024    12:19 PM   PHQ-2 ( 1999 [employer])   Q1: Little interest or pleasure in doing things 0   Q2: Feeling down, depressed or hopeless 0   PHQ-2 Score 0   Q1: Little interest or pleasure in doing things Not at all   Q2: Feeling down, depressed or hopeless Not at all   PHQ-2 Score 0           3/17/2024   Substance Use   If I could quit smoking, I would Neutral   I want to quit somking, worry about health affects Neutral   Willing to make a plan to quit smoking Neutral   Willing to cut down before quitting Neutral   Alcohol more than 3/day or more than 7/wk Yes   How often do you have a drink containing alcohol 2 to 3 times a week   How many alcohol drinks on typical day 3 or 4   How often do you have 5+ drinks at one occasion Monthly   Audit 2/3 Score 3   How often not able to stop drinking once started Less than monthly   How often failed to do what normally expected Never   How often needed first drink in am after a heavy drinking session Never   How often feeling of guilt or remorse after drinking Less than monthly   How often unable to remember what happened the night before Less than monthly   Have you or someone else been injured because of your drinking No   Has anyone been concerned or suggested you cut down on drinking No   TOTAL SCORE - AUDIT 9   Do you use any other substances recreationally? (!) ALCOHOL     Social History     Tobacco Use    Smoking status: Every Day     Packs/day: 0.50     Years: 25.00     Additional pack years: 0.00     Total pack years: 12.50     Types: Cigarettes    Smokeless tobacco: Former     Quit date: 4/30/2010    Tobacco comments:  "    quit 2010- restarted 2/2018   Substance Use Topics    Alcohol use: Yes     Alcohol/week: 10.0 standard drinks of alcohol     Comment: 3 x week    Drug use: Yes     Types: Marijuana     Comment: occ marijuana           3/17/2024   STI Screening   New sexual partner(s) since last STI/HIV test? No   Last PSA:   PSA   Date Value Ref Range Status   12/14/2020 1.39 0 - 4 ug/L Final     Comment:     Assay Method:  Chemiluminescence using Siemens Vista analyzer     Prostate Specific Antigen Screen   Date Value Ref Range Status   12/15/2022 0.88 0.00 - 4.50 ng/mL Final   12/16/2021 1.11 0.00 - 4.00 ug/L Final        Reviewed and updated as needed this visit by Provider   Tobacco  Allergies  Meds  Problems  Med Hx  Surg Hx  Fam Hx               Objective    Exam  BP (!) 146/80   Pulse 75   Temp 97.4  F (36.3  C) (Tympanic)   Resp 16   Ht 1.905 m (6' 3\")   Wt 117.3 kg (258 lb 11.2 oz)   SpO2 99%   BMI 32.34 kg/m     Estimated body mass index is 32.34 kg/m  as calculated from the following:    Height as of this encounter: 1.905 m (6' 3\").    Weight as of this encounter: 117.3 kg (258 lb 11.2 oz).    Physical Exam  GENERAL: healthy, alert and no distress  EYES: Eyes grossly normal to inspection, PERRL and conjunctivae and sclerae normal  HENT: ear canals and TM's normal, nose and mouth without ulcers or lesions  NECK: no adenopathy, no asymmetry, masses, or scars and thyroid normal to palpation  RESP: lungs clear to auscultation - no rales, rhonchi or wheezes  BREAST: normal without masses, tenderness or nipple discharge and no palpable axillary masses or adenopathy  CV: regular rate and rhythm, normal S1 S2, no S3 or S4, no murmur, click or rub, no peripheral edema and peripheral pulses strong  ABDOMEN: soft, nontender, no hepatosplenomegaly, no masses and bowel sounds normal   (male): normal male genitalia without lesions or urethral discharge, no hernia  MS: no gross musculoskeletal defects noted, no " edema  SKIN: no suspicious lesions or rashes  NEURO: Normal strength and tone, mentation intact and speech normal  PSYCH: mentation appears normal, affect normal/bright  LYMPH: no cervical, supraclavicular, axillary, or inguinal adenopathy  RECTAL: declined exam      Signed Electronically by: Eulogio Yuen MD

## 2024-03-19 NOTE — RESULT ENCOUNTER NOTE
Dear Gonzalez,    Here is a summary of your recent test results:  -Normal red blood cell (hgb) levels, normal white blood cell count and slight low platelet levels.    -PSA (prostate specific antigen) test is normal.  This indicates a low likelihood of prostate cancer.  ADVISE: rechecking this in 1 year.  -Cholesterol levels are at your goal levels.  ADVISE: continuing your medication, a regular exercise program with at least 150 minutes of aerobic exercise per week, and eating a low saturated fat/low carbohydrate diet.  Also, you should recheck this fasting cholesterol panel in 12 months.  -Liver and gallbladder tests (ALT,AST, Alk phos,bilirubin) are normal.  -Kidney function (GFR) is normal.  -Sodium is normal.  -Potassium is normal.  -Calcium is normal.  -Glucose is slight elevated and may be a sign of early diabetes (prediabetes). ADVISE:: eating a low carbohydrate diet, exercising, trying to lose weight (if necessary) and rechecking your glucose level in 12 months.  -TSH (thyroid stimulating hormone) level is normal which indicates normal thyroid function.    For additional lab test information, www.[email or web address] is a very good reference.    In addition, here is a list of due or overdue Health Maintenance reminders:  Pneumococcal Vaccine(2 of 2 - PCV) due on 06/15/2019    Please call us at [phone] (or use [employer]) to address the above recommendations if needed.           Thank you very much for trusting me and [facility].     Have a peaceful day.    Healthy regards,  Oracio Yuen MD

## 2024-05-23 ENCOUNTER — MYC REFILL (OUTPATIENT)
Dept: FAMILY MEDICINE | Facility: CLINIC | Age: 64
End: 2024-05-23
Payer: COMMERCIAL

## 2024-05-23 DIAGNOSIS — E78.5 HYPERLIPIDEMIA LDL GOAL <100: ICD-10-CM

## 2024-05-23 RX ORDER — SIMVASTATIN 20 MG
20 TABLET ORAL AT BEDTIME
Qty: 90 TABLET | Refills: 4 | OUTPATIENT
Start: 2024-05-23

## 2024-06-17 PROBLEM — Z71.89 ADVANCED DIRECTIVES, COUNSELING/DISCUSSION: Status: RESOLVED | Noted: 2017-06-28 | Resolved: 2024-06-17

## 2024-07-02 ENCOUNTER — TRANSFERRED RECORDS (OUTPATIENT)
Dept: HEALTH INFORMATION MANAGEMENT | Facility: CLINIC | Age: 64
End: 2024-07-02
Payer: COMMERCIAL

## 2024-07-10 ENCOUNTER — MYC MEDICAL ADVICE (OUTPATIENT)
Dept: FAMILY MEDICINE | Facility: CLINIC | Age: 64
End: 2024-07-10
Payer: COMMERCIAL

## 2024-07-10 DIAGNOSIS — M25.512 ACUTE PAIN OF LEFT SHOULDER: Primary | ICD-10-CM

## 2024-07-11 RX ORDER — OXYCODONE HYDROCHLORIDE 5 MG/1
5 TABLET ORAL EVERY 6 HOURS PRN
Qty: 12 TABLET | Refills: 0 | Status: SHIPPED | OUTPATIENT
Start: 2024-07-11 | End: 2024-07-14

## 2024-08-02 ENCOUNTER — MYC MEDICAL ADVICE (OUTPATIENT)
Dept: FAMILY MEDICINE | Facility: CLINIC | Age: 64
End: 2024-08-02
Payer: COMMERCIAL

## 2024-08-02 ENCOUNTER — MYC REFILL (OUTPATIENT)
Dept: FAMILY MEDICINE | Facility: CLINIC | Age: 64
End: 2024-08-02
Payer: COMMERCIAL

## 2024-08-02 DIAGNOSIS — E78.5 HYPERLIPIDEMIA LDL GOAL <100: ICD-10-CM

## 2024-08-05 RX ORDER — SIMVASTATIN 20 MG
20 TABLET ORAL AT BEDTIME
Qty: 90 TABLET | Refills: 4 | OUTPATIENT
Start: 2024-08-05

## 2025-01-13 ENCOUNTER — MYC MEDICAL ADVICE (OUTPATIENT)
Dept: FAMILY MEDICINE | Facility: CLINIC | Age: 65
End: 2025-01-13
Payer: COMMERCIAL

## 2025-01-13 DIAGNOSIS — K21.9 GASTROESOPHAGEAL REFLUX DISEASE WITHOUT ESOPHAGITIS: Primary | ICD-10-CM

## 2025-01-13 RX ORDER — ESOMEPRAZOLE MAGNESIUM 40 MG/1
40 CAPSULE, DELAYED RELEASE ORAL 2 TIMES DAILY
Qty: 180 CAPSULE | Refills: 2 | Status: SHIPPED | OUTPATIENT
Start: 2025-01-13

## 2025-02-13 ENCOUNTER — MYC REFILL (OUTPATIENT)
Dept: FAMILY MEDICINE | Facility: CLINIC | Age: 65
End: 2025-02-13
Payer: COMMERCIAL

## 2025-02-13 DIAGNOSIS — K21.9 GASTROESOPHAGEAL REFLUX DISEASE WITHOUT ESOPHAGITIS: ICD-10-CM

## 2025-02-13 RX ORDER — ESOMEPRAZOLE MAGNESIUM 40 MG/1
40 CAPSULE, DELAYED RELEASE ORAL 2 TIMES DAILY
Qty: 180 CAPSULE | Refills: 2 | OUTPATIENT
Start: 2025-02-13

## 2025-02-25 ENCOUNTER — MYC MEDICAL ADVICE (OUTPATIENT)
Dept: FAMILY MEDICINE | Facility: CLINIC | Age: 65
End: 2025-02-25
Payer: COMMERCIAL

## 2025-02-25 ENCOUNTER — MYC REFILL (OUTPATIENT)
Dept: FAMILY MEDICINE | Facility: CLINIC | Age: 65
End: 2025-02-25
Payer: COMMERCIAL

## 2025-02-25 DIAGNOSIS — E78.5 HYPERLIPIDEMIA LDL GOAL <100: ICD-10-CM

## 2025-02-25 DIAGNOSIS — N52.9 ERECTILE DYSFUNCTION, UNSPECIFIED ERECTILE DYSFUNCTION TYPE: ICD-10-CM

## 2025-02-25 DIAGNOSIS — Z87.891 PERSONAL HISTORY OF TOBACCO USE: Primary | ICD-10-CM

## 2025-02-25 RX ORDER — SIMVASTATIN 20 MG
20 TABLET ORAL AT BEDTIME
Qty: 90 TABLET | Refills: 0 | OUTPATIENT
Start: 2025-02-25

## 2025-02-25 RX ORDER — SILDENAFIL 50 MG/1
50-100 TABLET, FILM COATED ORAL DAILY PRN
Qty: 30 TABLET | Refills: 0 | Status: SHIPPED | OUTPATIENT
Start: 2025-02-25

## 2025-03-19 ENCOUNTER — HOSPITAL ENCOUNTER (OUTPATIENT)
Dept: CT IMAGING | Facility: CLINIC | Age: 65
Discharge: HOME | End: 2025-03-19
Attending: FAMILY MEDICINE
Payer: COMMERCIAL

## 2025-03-19 DIAGNOSIS — Z87.891 PERSONAL HISTORY OF TOBACCO USE: ICD-10-CM

## 2025-03-19 PROCEDURE — 71271 CT THORAX LUNG CANCER SCR C-: CPT

## 2025-03-20 NOTE — RESULT ENCOUNTER NOTE
Dear Gonzalez,    Here is a summary of your recent test results:  -Lung CT did not show any signs of suspicious lung nodules. ADVISE: Rechecking a lung CT scan in 12 months.    For additional lab test information, www.[email or web address] is a very good reference.    In addition, here is a list of due or overdue Health Maintenance reminders:  Pneumococcal Vaccine(2 of 2 - PCV) due on 06/15/2019  Flu Vaccine(1) due on 09/01/2024  PHQ-2 (once per calendar year) due on 01/01/2025  Comprehensive Metabolic Panel due on 03/18/2025  Cholesterol Lab due on 03/18/2025  Prostate Test due on 03/18/2025  ANNUAL REVIEW OF HM ORDERS due on 03/18/2025  Complete Blood Count due on 03/18/2025  Yearly Preventive Visit due on 03/18/2025    Please call us at [phone] (or use [employer]) to address the above recommendations if needed.           Thank you very much for trusting me and [facility].     Have a peaceful day.    Healthy regards,  Oracio Yuen MD

## 2025-03-21 PROBLEM — R11.2 NAUSEA AND VOMITING, UNSPECIFIED VOMITING TYPE: Status: RESOLVED | Noted: 2024-01-13 | Resolved: 2025-03-21

## 2025-03-21 PROBLEM — R42 VERTIGO: Status: RESOLVED | Noted: 2024-01-13 | Resolved: 2025-03-21

## 2025-07-19 ENCOUNTER — TRANSFERRED RECORDS (OUTPATIENT)
Dept: HEALTH INFORMATION MANAGEMENT | Facility: CLINIC | Age: 65
End: 2025-07-19
Payer: COMMERCIAL

## (undated) DEVICE — ENDO BITE BLOCK ADULT OLYMPUS LATEX FREE MAJ-1632

## (undated) DEVICE — ENDO FORCEP ENDOJAW BIOPSY 2.8MMX160CM FB-220K

## (undated) DEVICE — Device

## (undated) DEVICE — KIT ENDO TURNOVER/PROCEDURE W/CLEAN A SCOPE LINERS 103888

## (undated) RX ORDER — FENTANYL CITRATE 50 UG/ML
INJECTION, SOLUTION INTRAMUSCULAR; INTRAVENOUS
Status: DISPENSED
Start: 2018-06-04

## (undated) RX ORDER — FENTANYL CITRATE 50 UG/ML
INJECTION, SOLUTION INTRAMUSCULAR; INTRAVENOUS
Status: DISPENSED
Start: 2024-01-10